# Patient Record
Sex: MALE | Race: WHITE | Employment: OTHER | URBAN - METROPOLITAN AREA
[De-identification: names, ages, dates, MRNs, and addresses within clinical notes are randomized per-mention and may not be internally consistent; named-entity substitution may affect disease eponyms.]

---

## 2017-12-05 ENCOUNTER — OFFICE VISIT (OUTPATIENT)
Dept: FAMILY MEDICINE CLINIC | Age: 57
End: 2017-12-05

## 2017-12-05 VITALS
SYSTOLIC BLOOD PRESSURE: 131 MMHG | HEIGHT: 69 IN | RESPIRATION RATE: 16 BRPM | TEMPERATURE: 97.8 F | OXYGEN SATURATION: 98 % | HEART RATE: 76 BPM | DIASTOLIC BLOOD PRESSURE: 81 MMHG | BODY MASS INDEX: 33.62 KG/M2 | WEIGHT: 227 LBS

## 2017-12-05 DIAGNOSIS — E55.9 VITAMIN D DEFICIENCY: ICD-10-CM

## 2017-12-05 DIAGNOSIS — F41.9 ANXIETY: ICD-10-CM

## 2017-12-05 DIAGNOSIS — Z12.5 PROSTATE CANCER SCREENING: ICD-10-CM

## 2017-12-05 DIAGNOSIS — E78.00 PURE HYPERCHOLESTEROLEMIA: ICD-10-CM

## 2017-12-05 DIAGNOSIS — I25.10 CORONARY ARTERY DISEASE INVOLVING NATIVE HEART WITHOUT ANGINA PECTORIS, UNSPECIFIED VESSEL OR LESION TYPE: ICD-10-CM

## 2017-12-05 DIAGNOSIS — H53.8 BLURRED VISION: ICD-10-CM

## 2017-12-05 DIAGNOSIS — H61.21 IMPACTED CERUMEN OF RIGHT EAR: ICD-10-CM

## 2017-12-05 DIAGNOSIS — I10 ESSENTIAL HYPERTENSION: ICD-10-CM

## 2017-12-05 DIAGNOSIS — Z23 NEED FOR SHINGLES VACCINE: ICD-10-CM

## 2017-12-05 DIAGNOSIS — R53.83 FATIGUE, UNSPECIFIED TYPE: Primary | ICD-10-CM

## 2017-12-05 LAB — HBA1C MFR BLD HPLC: 5.5 %

## 2017-12-05 RX ORDER — ESCITALOPRAM OXALATE 10 MG/1
TABLET ORAL
Qty: 90 TAB | Refills: 3 | Status: SHIPPED | OUTPATIENT
Start: 2017-12-05 | End: 2018-04-03 | Stop reason: SDUPTHER

## 2017-12-05 NOTE — PROGRESS NOTES
Noted fatigue past 2 mos. Sleeps O.K. Business slow. Working on diet and exercise. Weights an hour every other day. Has to take nap midday. Wakes up and still tired. Notes blurred vision at times. Eye redness in AM.  Rec try OTC lubricant. Advised to get wife to check for snoring and apnea. Visit Vitals    /81 (BP 1 Location: Right arm, BP Patient Position: Sitting)    Pulse 76    Temp 97.8 °F (36.6 °C) (Oral)    Resp 16    Ht 5' 8.5\" (1.74 m)    Wt 227 lb (103 kg)    SpO2 98%    BMI 34.01 kg/m2       Patient alert and cooperative. Left canal, TM clear. Right canal occluded with dark cerumen. Assessment:  1. Right cerumen impaction. 2. Fatigue. 3. Blurred vision. 4. Chronic anxiety, on chronic meds. 5. Vitamin D deficiency. 6. History of CAD. 7. Hypercholesterol. 8. HTN. Plan:  1. Check annual labs per orders. 2. Schedule afternoon physical at his convenience. 3. May need sleep evaluation to rule out apnea. 4. Praised for weight loss. 5. Follow otherwise here prn. Printed script for Shingrix vaccine.

## 2017-12-05 NOTE — PROGRESS NOTES
Chief Complaint   Patient presents with   Mendoza Ao in Ear     Last couple of months all the time.  Fatigue   Blurred vision at times and it is worse. Has been seen by the eye doctor. 1. Have you been to the ER, urgent care clinic since your last visit? Hospitalized since your last visit? No    2. Have you seen or consulted any other health care providers outside of the [address] [place] [address] since your last visit? Include any pap smears or colon screening. No   Saw Dr. Tom Valdes and in 6 weeks will do recheck. Had EKG done at that office. Advance Care Planning information reviewed and given to the patient at a previous visit. I have reviewed Health Maintenance with the patient and updated.

## 2017-12-05 NOTE — LETTER
12/7/2017 11:54 AM 
 
Mr. Jhon Rankin Dear Jhon Karri: 
 
Please find your most recent results below. Resulted Orders AMB POC HEMOGLOBIN A1C Result Value Ref Range Hemoglobin A1c (POC) 5.5 % VITAMIN D, 25 HYDROXY Result Value Ref Range VITAMIN D, 25-HYDROXY 20.3 (L) 30.0 - 100.0 ng/mL Comment:  
   Vitamin D deficiency has been defined by the [address] [place] [address] practice guideline as a 
level of serum 25-OH vitamin D less than 20 ng/mL (1,2). The Endocrine Society went on to further define vitamin D 
insufficiency as a level between 21 and 29 ng/mL (2). 1. IOM ([place] of Medicine). 2010. Dietary reference 
   intakes for calcium and D. [facility]: The 
   [place]. 2. Fausto MF, Abi WHITNEY, Uziel GARCIAS, et al. 
   Evaluation, treatment, and prevention of vitamin D 
   deficiency: an Endocrine Society clinical practice 
   guideline. JCEM. 2011 Jul; 96(7):1911-30. Narrative Performed at:  [address]  [id number] : Enedina Hernandez MD, Phone:  [phone] CBC WITH AUTOMATED DIFF Result Value Ref Range WBC 5.9 3.4 - 10.8 x10E3/uL  
 RBC 5.06 4.14 - 5.80 x10E6/uL HGB 14.5 13.0 - 17.7 g/dL Comment: **Please note reference interval change** HCT 45.0 37.5 - 51.0 % MCV 89 79 - 97 fL  
 MCH 28.7 26.6 - 33.0 pg  
 MCHC 32.2 31.5 - 35.7 g/dL  
 RDW 14.1 12.3 - 15.4 % PLATELET 669 102 - 695 x10E3/uL NEUTROPHILS 60 Not Estab. % Lymphocytes 27 Not Estab. % MONOCYTES 8 Not Estab. % EOSINOPHILS 3 Not Estab. % BASOPHILS 1 Not Estab. %  
 ABS. NEUTROPHILS 3.6 1.4 - 7.0 x10E3/uL Abs Lymphocytes 1.6 0.7 - 3.1 x10E3/uL  
 ABS. MONOCYTES 0.5 0.1 - 0.9 x10E3/uL  
 ABS. EOSINOPHILS 0.2 0.0 - 0.4 x10E3/uL  
 ABS. BASOPHILS 0.0 0.0 - 0.2 x10E3/uL IMMATURE GRANULOCYTES 1 Not Estab. %  
 ABS. IMM. GRANS. 0.1 0.0 - 0.1 x10E3/uL Narrative Performed at:  [address]  [id number] : Terry Ramirez MD, Phone:  [phone] URINALYSIS W/ RFLX MICROSCOPIC Result Value Ref Range Specific Gravity 1.028 1.005 - 1.030  
 pH (UA) 6.5 5.0 - 7.5 Color Yellow Yellow Appearance Clear Clear Leukocyte Esterase Negative Negative Protein Negative Negative/Trace Glucose Negative Negative Ketone Negative Negative Blood Negative Negative Bilirubin Negative Negative Urobilinogen 0.2 0.2 - 1.0 mg/dL Nitrites Negative Negative Microscopic Examination Comment Comment:  
   Microscopic not indicated and not performed. Narrative Performed at:  [address]  [id number] : Terry Ramirez MD, Phone:  [phone] TSH 3RD GENERATION Result Value Ref Range TSH 1.770 0.450 - 4.500 uIU/mL Narrative Performed at:  [address]  [id number] : Terry Ramirez MD, Phone:  [phone] PSA, DIAGNOSTIC (PROSTATE SPECIFIC AG) Result Value Ref Range Prostate Specific Ag 0.7 0.0 - 4.0 ng/mL Comment:  
   Roche ECLIA methodology. According to the American Urological Association, Serum PSA should 
decrease and remain at undetectable levels after radical 
prostatectomy. The AUA defines biochemical recurrence as an initial 
PSA value 0.2 ng/mL or greater followed by a subsequent confirmatory PSA value 0.2 ng/mL or greater. Values obtained with different assay methods or kits cannot be used 
interchangeably. Results cannot be interpreted as absolute evidence 
of the presence or absence of malignant disease. Narrative Performed at:  [address]  [id number] : Sindi Sprague MD, Phone:  [phone] METABOLIC PANEL, COMPREHENSIVE Result Value Ref Range Glucose 116 (H) 65 - 99 mg/dL BUN 11 6 - 24 mg/dL Creatinine 0.69 (L) 0.76 - 1.27 mg/dL GFR est non- >59 mL/min/1.73 GFR est  >59 mL/min/1.73  
 BUN/Creatinine ratio 16 9 - 20 Sodium 144 134 - 144 mmol/L Potassium 4.4 3.5 - 5.2 mmol/L Chloride 104 96 - 106 mmol/L  
 CO2 25 18 - 29 mmol/L Calcium 9.2 8.7 - 10.2 mg/dL Protein, total 6.6 6.0 - 8.5 g/dL Albumin 4.1 3.5 - 5.5 g/dL GLOBULIN, TOTAL 2.5 1.5 - 4.5 g/dL A-G Ratio 1.6 1.2 - 2.2 Bilirubin, total 0.3 0.0 - 1.2 mg/dL Alk. phosphatase 77 39 - 117 IU/L  
 AST (SGOT) 16 0 - 40 IU/L  
 ALT (SGPT) 21 0 - 44 IU/L Narrative Performed at:  [address]  [id number] : Sindi Sprague MD, Phone:  [phone] LIPID PANEL Result Value Ref Range Cholesterol, total 216 (H) 100 - 199 mg/dL Triglyceride 269 (H) 0 - 149 mg/dL HDL Cholesterol 34 (L) >39 mg/dL VLDL, calculated 54 (H) 5 - 40 mg/dL LDL, calculated 128 (H) 0 - 99 mg/dL Narrative Performed at:  [address]  [id number] : Sindi Sprague MD, Phone:  [phone] CVD REPORT Result Value Ref Range INTERPRETATION Note Comment:  
   Supplemental report is available. Narrative Performed at:  [address] [place] [address]  [id number] : uGnnar Cruz PhD, Phone:  [phone] Low Vit. DLaurina Pleasure Over The Counter supplement 5070-3308 units daily.  Lipids improved.  Add Krill oil for increased  TriGlycerides/low HDL(good Cholesterol).  Rest all O.K.  
   
 
 
 
Please call me if you have any questions: [phone] Sincerely, Nina Freeman MD

## 2017-12-05 NOTE — MR AVS SNAPSHOT
Visit Information Date & Time Provider Department Dept. Phone Encounter #  
 12/5/2017  9:40 AM Zen Benites MD [facility] [phone] [id number] Follow-up Instructions Return in about 3 months (around 3/5/2018) for [facility]. Upcoming Health Maintenance Date Due DTaP/Tdap/Td series (3 - Td) 5/24/2025 COLONOSCOPY 1/10/2027 Allergies as of 12/5/2017  Review Complete On: 12/5/2017 By: Zen Benites MD  
 No Known Allergies Current Immunizations  Reviewed on 12/5/2017 Name Date H1N1 FLU VACCINE 1/21/2010 Influenza Vaccine 10/3/2017, 10/1/2016, 11/5/2015, 10/4/2013 Influenza Vaccine (Quad) PF 10/21/2014 Influenza Vaccine Split 10/31/2012, 1/6/2012, 9/21/2010 Pneumococcal Conjugate (PCV-13) 10/21/2014 Pneumococcal Polysaccharide (PPSV-23) 12/1/2016 TDAP Vaccine 5/29/2008 Tdap 5/24/2015  6:34 PM  
  
 Reviewed by Zuleyma Bianchi RN on 12/5/2017 at 10:04 AM  
You Were Diagnosed With   
  
 Codes Comments Fatigue, unspecified type    -  Primary ICD-10-CM: R53.83 ICD-9-CM: 780.79 Anxiety     ICD-10-CM: F41.9 ICD-9-CM: 300.00 Blurred vision     ICD-10-CM: H53.8 ICD-9-CM: 368.8 Impacted cerumen of right ear     ICD-10-CM: H61.21 ICD-9-CM: 380.4 Vitamin D deficiency     ICD-10-CM: E55.9 ICD-9-CM: 268.9 Coronary artery disease involving native heart without angina pectoris, unspecified vessel or lesion type     ICD-10-CM: I25.10 ICD-9-CM: 414.01   
 Pure hypercholesterolemia     ICD-10-CM: E78.00 ICD-9-CM: 272.0 Essential hypertension     ICD-10-CM: I10 
ICD-9-CM: 401.9 Prostate cancer screening     ICD-10-CM: Z12.5 ICD-9-CM: V76.44 Vitals BP Pulse Temp Resp Height(growth percentile) Weight(growth percentile) 131/81 (BP 1 Location: Right arm, BP Patient Position: Sitting) 76 97.8 °F (36.6 °C) (Oral) 16 5' 8.5\" (1.74 m) 227 lb (103 kg) SpO2 BMI Smoking Status 98% 34.01 kg/m2 Current Every Day Smoker Vitals History BMI and BSA Data Body Mass Index Body Surface Area 34.01 kg/m 2 2.23 m 2 Preferred Pharmacy Pharmacy Name Phone [facility] [phone] Your Updated Medication List  
  
   
This list is accurate as of: 12/5/17 10:41 AM.  Always use your most recent med list.  
  
  
  
  
 aspirin delayed-release 81 mg tablet Take 81 mg by mouth daily. atorvastatin 40 mg tablet Commonly known as:  LIPITOR  
TAKE ONE TABLET BY MOUTH ONE TIME DAILY  
  
 escitalopram oxalate 10 mg tablet Commonly known as:  Celesta Murders TAKE 1 TABLET DAILY FOR    MOOD  
  
 LASIX 20 mg tablet Generic drug:  furosemide Take  by mouth daily. metoprolol tartrate 50 mg tablet Commonly known as:  LOPRESSOR Take 1 Tab by mouth daily. Prescriptions Sent to Pharmacy Refills  
 escitalopram oxalate (LEXAPRO) 10 mg tablet 3 Sig: TAKE 1 TABLET DAILY FOR    MOOD Class: Normal  
 Pharmacy: [facility] [address] Ph #: [phone] We Performed the Following AMB POC HEMOGLOBIN A1C [34092 CPT(R)] CBC WITH AUTOMATED DIFF [95730 CPT(R)] LIPID PANEL [73486 CPT(R)] METABOLIC PANEL, COMPREHENSIVE [93275 CPT(R)] PSA, DIAGNOSTIC (PROSTATE SPECIFIC AG) [id number] CPT(R)] REMOVAL IMPACTED CERUMEN IRRIGATION/LVG UNILAT [id number] CPT(R)] TSH 3RD GENERATION [00445 CPT(R)] URINALYSIS W/ RFLX MICROSCOPIC [61973 CPT(R)] VITAMIN D, 25 HYDROXY [id number] CPT(R)] Follow-up Instructions Return in about 3 months (around 3/5/2018) for [facility]. Introducing [address]! Aldo Guillory introduces [employer] patient portal. Now you can access parts of your medical record, email your doctor's office, and request medication refills online. 1. In your internet browser, go to [email or web address] 2. Click on the First Time User? Click Here link in the Sign In box. You will see the New Member Sign Up page. 3. Enter your [employer] Access Code exactly as it appears below. You will not need to use this code after youve completed the sign-up process. If you do not sign up before the expiration date, you must request a new code. · [employer] Access Code: [email or web address] Expires: 3/5/2018 10:34 AM 
 
4. Enter the last four digits of your Social Security Number (xxxx) and Date of Birth (mm/dd/yyyy) as indicated and click Submit. You will be taken to the next sign-up page. 5. Create a [employer] ID. This will be your [employer] login ID and cannot be changed, so think of one that is secure and easy to remember. 6. Create a [employer] password. You can change your password at any time. 7. Enter your Password Reset Question and Answer. This can be used at a later time if you forget your password. 8. Enter your e-mail address. You will receive e-mail notification when new information is available in [address] 9. Click Sign Up. You can now view and download portions of your medical record. 10. Click the Download Summary menu link to download a portable copy of your medical information. If you have questions, please visit the Frequently Asked Questions section of the [employer] website. Remember, [employer] is NOT to be used for urgent needs. For medical emergencies, dial 911. Now available from your iPhone and Android! Please provide this summary of care documentation to your next provider. Your primary care clinician is listed as [id number] If you have any questions after today's visit, please call [phone].

## 2017-12-06 LAB
25(OH)D3+25(OH)D2 SERPL-MCNC: 20.3 NG/ML (ref 30–100)
ALBUMIN SERPL-MCNC: 4.1 G/DL (ref 3.5–5.5)
ALBUMIN/GLOB SERPL: 1.6 {RATIO} (ref 1.2–2.2)
ALP SERPL-CCNC: 77 IU/L (ref 39–117)
ALT SERPL-CCNC: 21 IU/L (ref 0–44)
APPEARANCE UR: CLEAR
AST SERPL-CCNC: 16 IU/L (ref 0–40)
BASOPHILS # BLD AUTO: 0 X10E3/UL (ref 0–0.2)
BASOPHILS NFR BLD AUTO: 1 %
BILIRUB SERPL-MCNC: 0.3 MG/DL (ref 0–1.2)
BILIRUB UR QL STRIP: NEGATIVE
BUN SERPL-MCNC: 11 MG/DL (ref 6–24)
BUN/CREAT SERPL: 16 (ref 9–20)
CALCIUM SERPL-MCNC: 9.2 MG/DL (ref 8.7–10.2)
CHLORIDE SERPL-SCNC: 104 MMOL/L (ref 96–106)
CHOLEST SERPL-MCNC: 216 MG/DL (ref 100–199)
CO2 SERPL-SCNC: 25 MMOL/L (ref 18–29)
COLOR UR: YELLOW
CREAT SERPL-MCNC: 0.69 MG/DL (ref 0.76–1.27)
EOSINOPHIL # BLD AUTO: 0.2 X10E3/UL (ref 0–0.4)
EOSINOPHIL NFR BLD AUTO: 3 %
ERYTHROCYTE [DISTWIDTH] IN BLOOD BY AUTOMATED COUNT: 14.1 % (ref 12.3–15.4)
GFR SERPLBLD CREATININE-BSD FMLA CKD-EPI: 105 ML/MIN/1.73
GFR SERPLBLD CREATININE-BSD FMLA CKD-EPI: 122 ML/MIN/1.73
GLOBULIN SER CALC-MCNC: 2.5 G/DL (ref 1.5–4.5)
GLUCOSE SERPL-MCNC: 116 MG/DL (ref 65–99)
GLUCOSE UR QL: NEGATIVE
HCT VFR BLD AUTO: 45 % (ref 37.5–51)
HDLC SERPL-MCNC: 34 MG/DL
HGB BLD-MCNC: 14.5 G/DL (ref 13–17.7)
HGB UR QL STRIP: NEGATIVE
IMM GRANULOCYTES # BLD: 0.1 X10E3/UL (ref 0–0.1)
IMM GRANULOCYTES NFR BLD: 1 %
INTERPRETATION, 910389: NORMAL
KETONES UR QL STRIP: NEGATIVE
LDLC SERPL CALC-MCNC: 128 MG/DL (ref 0–99)
LEUKOCYTE ESTERASE UR QL STRIP: NEGATIVE
LYMPHOCYTES # BLD AUTO: 1.6 X10E3/UL (ref 0.7–3.1)
LYMPHOCYTES NFR BLD AUTO: 27 %
MCH RBC QN AUTO: 28.7 PG (ref 26.6–33)
MCHC RBC AUTO-ENTMCNC: 32.2 G/DL (ref 31.5–35.7)
MCV RBC AUTO: 89 FL (ref 79–97)
MICRO URNS: NORMAL
MONOCYTES # BLD AUTO: 0.5 X10E3/UL (ref 0.1–0.9)
MONOCYTES NFR BLD AUTO: 8 %
NEUTROPHILS # BLD AUTO: 3.6 X10E3/UL (ref 1.4–7)
NEUTROPHILS NFR BLD AUTO: 60 %
NITRITE UR QL STRIP: NEGATIVE
PH UR STRIP: 6.5 [PH] (ref 5–7.5)
PLATELET # BLD AUTO: 219 X10E3/UL (ref 150–379)
POTASSIUM SERPL-SCNC: 4.4 MMOL/L (ref 3.5–5.2)
PROT SERPL-MCNC: 6.6 G/DL (ref 6–8.5)
PROT UR QL STRIP: NEGATIVE
PSA SERPL-MCNC: 0.7 NG/ML (ref 0–4)
RBC # BLD AUTO: 5.06 X10E6/UL (ref 4.14–5.8)
SODIUM SERPL-SCNC: 144 MMOL/L (ref 134–144)
SP GR UR: 1.03 (ref 1–1.03)
TRIGL SERPL-MCNC: 269 MG/DL (ref 0–149)
TSH SERPL DL<=0.005 MIU/L-ACNC: 1.77 UIU/ML (ref 0.45–4.5)
UROBILINOGEN UR STRIP-MCNC: 0.2 MG/DL (ref 0.2–1)
VLDLC SERPL CALC-MCNC: 54 MG/DL (ref 5–40)
WBC # BLD AUTO: 5.9 X10E3/UL (ref 3.4–10.8)

## 2017-12-06 NOTE — PROGRESS NOTES
Low Vit. D. Take OTC supp 3111-5136 units daily. Lipids improved. Add Krill oil for inc TG/low HDL.   Rest all O.K.

## 2018-04-03 DIAGNOSIS — F41.9 ANXIETY: ICD-10-CM

## 2018-04-04 RX ORDER — ESCITALOPRAM OXALATE 10 MG/1
TABLET ORAL
Qty: 90 TAB | Refills: 3 | Status: SHIPPED | OUTPATIENT
Start: 2018-04-04 | End: 2018-07-03

## 2018-04-04 NOTE — TELEPHONE ENCOUNTER
PCP: Aric Méndez MD    Last appt: 12/5/2017  No future appointments.     Requested Prescriptions     Pending Prescriptions Disp Refills    escitalopram oxalate (LEXAPRO) 10 mg tablet 90 Tab 3     Sig: TAKE 1 TABLET DAILY FOR    MOOD     Lab Results   Component Value Date/Time    Sodium 144 12/05/2017 11:27 AM    Potassium 4.4 12/05/2017 11:27 AM    Chloride 104 12/05/2017 11:27 AM    CO2 25 12/05/2017 11:27 AM    Anion gap 12 10/20/2010 03:15 PM    Glucose 116 (H) 12/05/2017 11:27 AM    BUN 11 12/05/2017 11:27 AM    Creatinine 0.69 (L) 12/05/2017 11:27 AM    BUN/Creatinine ratio 16 12/05/2017 11:27 AM    GFR est  12/05/2017 11:27 AM    GFR est non- 12/05/2017 11:27 AM    Calcium 9.2 12/05/2017 11:27 AM     Lab Results   Component Value Date/Time    Hemoglobin A1c 6.0 (H) 05/07/2013 10:46 AM    Hemoglobin A1c (POC) 5.5 12/05/2017 10:22 AM     Lab Results   Component Value Date/Time    Cholesterol, total 216 (H) 12/05/2017 11:27 AM    HDL Cholesterol 34 (L) 12/05/2017 11:27 AM    LDL, calculated 128 (H) 12/05/2017 11:27 AM    VLDL, calculated 54 (H) 12/05/2017 11:27 AM    Triglyceride 269 (H) 12/05/2017 11:27 AM    CHOL/HDL Ratio 4.9 04/26/2010 10:29 AM     Lab Results   Component Value Date/Time    TSH 1.770 12/05/2017 11:27 AM

## 2018-04-24 ENCOUNTER — TELEPHONE (OUTPATIENT)
Dept: FAMILY MEDICINE CLINIC | Age: 58
End: 2018-04-24

## 2018-04-24 NOTE — TELEPHONE ENCOUNTER
Patient did not get his my chart message. Message left on his voice mail that his medication had been refilled.

## 2018-05-22 ENCOUNTER — OFFICE VISIT (OUTPATIENT)
Dept: FAMILY MEDICINE CLINIC | Age: 58
End: 2018-05-22

## 2018-05-22 VITALS
BODY MASS INDEX: 32.51 KG/M2 | OXYGEN SATURATION: 99 % | RESPIRATION RATE: 18 BRPM | WEIGHT: 219.5 LBS | TEMPERATURE: 97.2 F | SYSTOLIC BLOOD PRESSURE: 152 MMHG | HEART RATE: 72 BPM | HEIGHT: 69 IN | DIASTOLIC BLOOD PRESSURE: 90 MMHG

## 2018-05-22 DIAGNOSIS — R53.83 FATIGUE, UNSPECIFIED TYPE: ICD-10-CM

## 2018-05-22 DIAGNOSIS — H53.8 BLURRY VISION: Primary | ICD-10-CM

## 2018-05-22 DIAGNOSIS — R07.89 RIGHT-SIDED CHEST WALL PAIN: ICD-10-CM

## 2018-05-22 DIAGNOSIS — R07.9 CHEST PAIN, UNSPECIFIED TYPE: ICD-10-CM

## 2018-05-22 DIAGNOSIS — R20.2 PARESTHESIA: ICD-10-CM

## 2018-05-22 DIAGNOSIS — M79.645 PAIN OF FINGER OF LEFT HAND: ICD-10-CM

## 2018-05-22 NOTE — PATIENT INSTRUCTIONS
Stanley Espinoza TODAY, please go to:   CHECK OUT - If you received a referral, Show the     Please schedule the following appointments at [address] [place] [address]:  · Lab and imaging follow up with Dr. Jay Jay Khanna in 3 weeks. · Lab visit, NOT fasting, before our next visit. · Disability eval with Dr. Antonette Garcia in July 2018        Today's Plan:  Have Xrays and ultrasound  Have blood work  Have ECHO    _____________________   Please sign up for [employer] to receive your results electronically.

## 2018-05-22 NOTE — PROGRESS NOTES
[address] [place] [address] Visit   05/22/2018  Patient ID: Debbie Vance is a 62 y.o. male. Assessment/Plan:    Diagnoses and all orders for this visit:    1. Blurry vision  -     REFERRAL TO OPHTHALMOLOGY    2. Fatigue, unspecified type  -     VITAMIN D, 25 HYDROXY  -     VITAMIN B12 & FOLATE  -     2D ECHO COMPLETE ADULT (TTE) W OR WO CONTR; Future    3. Right-sided chest wall pain  -     XR CHEST PA LAT; Future  -     US RETROPERITONEUM COMP; Future    4. Pain of finger of left hand  -     XR 3RD FINGER RT MIN 2 V; Future    5. Chest pain, unspecified type    6. Paresthesia      Follow up labs and imaging on follow up visit  For finger, I suspect he has traumatized a superficial nerve when he has hit it before. eval with XR for e/o arthritis. Chest pain likely muscular, very limited, UTD with cardiology f/u  Chest wall/flank/back pain may be musculoskeletal. CXR and abdominal imaging. Update labs to eval fatigue. See ophtho again for second opinion. eval for HF given h/o MI and worsening fatigue    See PCP to discuss disability when he returns. See patient instructions for more. Counselled pt on Patient health concerns and plans. Patient was offered a choice/choices in the treatment plan today. Reviewed return precautions as appropriate. Patient expresses understanding of the plan and agrees with recommendations. Patient Instructions   . TODAY, please go to:   CHECK OUT - If you received a referral, Show the     Please schedule the following appointments at [address] [place] [address]:  · Lab and imaging follow up with Dr. Samantha Combs in 3 weeks. · Lab visit, NOT fasting, before our next visit. · Disability eval with Dr. Darnell Eduardo in July 2018        Today's Plan:  Have Xrays and ultrasound  Have blood work  Have ECHO    _____________________   Please sign up for [employer] to receive your results electronically.         Subjective:   HPI:  Debbie Vance is a 62 y.o. male being seen for:   Chief Complaint   Patient presents with    Side Pain     Right side for the past month and it comes and goes. Worse when laying down. Also has right hand pain at times.  Blurred Vision     Has been to the Eye doctor and got glasses but has blurred vision.  Fatigue     started 10 years ago. Fatigue  · After AAA repair. Then was told he had a silent heart attack, had a stent placed. Since then feels tired. Needs daily nap. No swelling in legs. Sometimes SOB. · Has lost weight on purpose. Blurry vision  ·  eyes red in the AM. Using visine for years. Worse now. Tried allergy eye drops in the past.   · Has gone to eye doctor. Tries glasses, gets vertigo. Side pain  ·  right side pain, when in recliner on in bed at night. Shooting pain. · Intermittent  · Sleeps on that side. Improves with position change. · X 2 months   · No chronic cough or hemoptysis. Pain in hand,  ·  right middle finger, X1 year. Has been getting burning in right calf X 2 weeks     Screening and Prevention Due:  There are no preventive care reminders to display for this patient. Review of Systems   Intermittent chest pain. With activity, improves with rest. Heart burn. Saw NP yesterday and was okay. Once a week. Lasts for seconds. Otherwise as noted in HPI    Social History     Social History Narrative     History   Smoking Status    Current Every Day Smoker    Packs/day: 0.25    Types: Cigarettes   Smokeless Tobacco    Never Used     ?   Objective:     Visit Vitals    /90 (BP 1 Location: Left arm, BP Patient Position: Sitting)    Pulse 72    Temp 97.2 °F (36.2 °C) (Oral)    Resp 18    Ht 5' 8.5\" (1.74 m)    Wt 219 lb 8 oz (99.6 kg)    SpO2 99%    BMI 32.89 kg/m2       BP Readings from Last 3 Encounters:   05/22/18 152/90   12/05/17 131/81   12/27/16 152/84       Wt Readings from Last 3 Encounters:   05/22/18 219 lb 8 oz (99.6 kg)   12/05/17 227 lb (103 kg)   12/27/16 266 lb 11.2 oz (121 kg) Physical Exam   Constitutional: He appears well-developed and well-nourished. No distress. Cardiovascular: Normal rate, regular rhythm and normal heart sounds. Exam reveals no gallop and no friction rub. No murmur heard. Pulmonary/Chest: Effort normal. No accessory muscle usage. No respiratory distress. He has no decreased breath sounds. He has no wheezes. He has no rhonchi. He has no rales. Nl inspection of right chest wall, back, and flank  No palpable deformity  ttp only in left flank   Musculoskeletal:   Nl sensation,  in right hand. Neg tinel's   Nl cap refill  No ttp at 3rd DIP or PIP   Neurological: He is alert. Psychiatric: He has a normal mood and affect. His behavior is normal.       No Known Allergies  Prior to Admission medications    Medication Sig Start Date End Date Taking? Authorizing Provider   escitalopram oxalate (LEXAPRO) 10 mg tablet TAKE 1 TABLET DAILY FOR    MOOD 4/4/18  Yes Alban Frost NP   atorvastatin (LIPITOR) 40 mg tablet TAKE ONE TABLET BY MOUTH ONE TIME DAILY  10/23/15  Yes Harley Goyal MD   metoprolol (LOPRESSOR) 50 mg tablet Take 1 Tab by mouth daily. 7/11/11  Yes Guerline Neves DO   furosemide (LASIX) 20 mg tablet Take  by mouth daily. Yes Historical Provider   aspirin delayed-release 81 mg tablet Take 81 mg by mouth daily.    Yes Historical Provider     Patient Active Problem List   Diagnosis Code    Pure hypercholesterolemia E78.00    CAD (coronary artery disease) I25.10    Essential hypertension I10    Anxiety F41.9    Cigarette smoker F17.210    Vitamin D deficiency E55.9    Non compliance with medical treatment Z91.19    Colon cancer screening Z12.11

## 2018-05-22 NOTE — MR AVS SNAPSHOT
[address] [facility] 
 
 
 [address] [facility] [address] 
[address] [facility] [address] 
[phone] Patient: Letty Haney MRN:  [id number] Visit Information Date & Time Provider Department Dept. Phone Encounter #  
 5/22/2018  5:00 PM [address] [facility] Nicolás Phoenix MD [facility] [phone] [id number] Upcoming Health Maintenance Date Due Influenza Age 5 to Adult 8/1/2018 DTaP/Tdap/Td series (3 - Td) 5/24/2025 COLONOSCOPY 1/10/2027 Allergies as of 5/22/2018  Review Complete On: 5/22/2018 By: Calderon Koehler RN No Known Allergies Current Immunizations  Reviewed on 12/5/2017 Name Date H1N1 FLU VACCINE 1/21/2010 Influenza Vaccine 10/3/2017, 10/1/2016, 11/5/2015, 10/4/2013 Influenza Vaccine (Quad) PF 10/21/2014 Influenza Vaccine Split 10/31/2012, 1/6/2012, 9/21/2010 Pneumococcal Conjugate (PCV-13) 10/21/2014 Pneumococcal Polysaccharide (PPSV-23) 12/1/2016 TDAP Vaccine 5/29/2008 Tdap 5/24/2015  6:34 PM  
  
 Not reviewed this visit You Were Diagnosed With   
  
 Codes Comments Blurry vision    -  Primary ICD-10-CM: H53.8 ICD-9-CM: 368.8 Fatigue, unspecified type     ICD-10-CM: R53.83 ICD-9-CM: 780.79 Right-sided chest wall pain     ICD-10-CM: R07.89 ICD-9-CM: 786.52 Pain of finger of left hand     ICD-10-CM: M79.645 ICD-9-CM: 729.5 Chest pain, unspecified type     ICD-10-CM: R07.9 ICD-9-CM: 786.50 Paresthesia     ICD-10-CM: R20.2 ICD-9-CM: 981. 0 Vitals BP Pulse Temp Resp Height(growth percentile) Weight(growth percentile) 152/90 (BP 1 Location: Left arm, BP Patient Position: Sitting) 72 97.2 °F (36.2 °C) (Oral) 18 5' 8.5\" (1.74 m) 219 lb 8 oz (99.6 kg) SpO2 BMI Smoking Status 99% 32.89 kg/m2 Current Every Day Smoker Vitals History BMI and BSA Data Body Mass Index Body Surface Area  
 32.89 kg/m 2 2.19 m 2 Preferred Pharmacy Pharmacy Name Phone [facility] [address] [place] [facility] Annia Noe [phone] Your Updated Medication List  
  
   
This list is accurate as of 5/22/18  6:46 PM.  Always use your most recent med list.  
  
  
  
  
 aspirin delayed-release 81 mg tablet Take 81 mg by mouth daily. atorvastatin 40 mg tablet Commonly known as:  LIPITOR  
TAKE ONE TABLET BY MOUTH ONE TIME DAILY  
  
 escitalopram oxalate 10 mg tablet Commonly known as:  Rubi Angst TAKE 1 TABLET DAILY FOR    MOOD  
  
 LASIX 20 mg tablet Generic drug:  furosemide Take  by mouth daily. metoprolol tartrate 50 mg tablet Commonly known as:  LOPRESSOR Take 1 Tab by mouth daily. We Performed the Following REFERRAL TO OPHTHALMOLOGY [REF57 Custom] Comments:  
 Please evaluate patient for blurry vision, red eyes , possible chronic dry eyes. VITAMIN B12 & FOLATE [69844 CPT(R)] VITAMIN D, 25 HYDROXY [id number] CPT(R)] To-Do List   
 05/22/2018 ECHO:  2D ECHO COMPLETE ADULT (TTE) W OR WO CONTR   
  
 05/22/2018 Imaging:  US RETROPERITONEUM COMP   
  
 05/22/2018 Imaging:  XR 3RD FINGER RT MIN 2 V   
  
 05/22/2018 Imaging:  XR CHEST PA LAT Referral Information Referral ID Referred By Referred To  
  
 [id number] Alanna Poole MD   
   [address] [facility] [address] [place] [address] Phone: [phone] Fax: [phone] Visits Status Start Date End Date 1 New Request 5/22/18 5/22/19 If your referral has a status of pending review or denied, additional information will be sent to support the outcome of this decision. Patient Instructions Annamary Jennifer TODAY, please go to: CHECK OUT - If you received a referral, Show the  Please schedule the following appointments at [facility] OUT: 
· Lab and imaging follow up with Dr. Adán Madrid in 3 weeks. · Lab visit, NOT fasting, before our next visit. · Disability eval with Dr. Garrett Lopez in July 2018 Today's Plan: 
Have Xrays and ultrasound Have blood work Have ECHO 
 
_____________________ Please sign up for Vibrow to receive your results electronically. Introducing \[facility]\"" & HEALTH SERVICES! Dear Joyce Denise: Thank you for requesting a [employer] account. Our records indicate that you already have an active [employer] account. You can access your account anytime at [email or web address] Did you know that you can access your hospital and ER discharge instructions at any time in [employer]? You can also review all of your test results from your hospital stay or ER visit. Additional Information If you have questions, please visit the Frequently Asked Questions section of the [employer] website at [email or web address] Remember, [employer] is NOT to be used for urgent needs. For medical emergencies, dial 911. Now available from your iPhone and Android! Please provide this summary of care documentation to your next provider. Your primary care clinician is listed as [id number] If you have any questions after today's visit, please call [phone].

## 2018-05-24 ENCOUNTER — HOSPITAL ENCOUNTER (OUTPATIENT)
Dept: GENERAL RADIOLOGY | Age: 58
Discharge: HOME | End: 2018-05-24
Payer: COMMERCIAL

## 2018-05-24 DIAGNOSIS — M79.645 PAIN OF FINGER OF LEFT HAND: ICD-10-CM

## 2018-05-24 DIAGNOSIS — R07.89 RIGHT-SIDED CHEST WALL PAIN: ICD-10-CM

## 2018-05-24 PROCEDURE — 71046 X-RAY EXAM CHEST 2 VIEWS: CPT

## 2018-05-24 PROCEDURE — 73140 X-RAY EXAM OF FINGER(S): CPT

## 2018-05-29 ENCOUNTER — TELEPHONE (OUTPATIENT)
Dept: FAMILY MEDICINE CLINIC | Age: 58
End: 2018-05-29

## 2018-05-29 ENCOUNTER — HOSPITAL ENCOUNTER (OUTPATIENT)
Dept: ULTRASOUND IMAGING | Age: 58
Discharge: HOME | End: 2018-05-29
Attending: FAMILY MEDICINE
Payer: COMMERCIAL

## 2018-05-29 DIAGNOSIS — R07.89 RIGHT-SIDED CHEST WALL PAIN: ICD-10-CM

## 2018-05-29 DIAGNOSIS — N28.89 RIGHT KIDNEY MASS: Primary | ICD-10-CM

## 2018-05-29 PROCEDURE — 76770 US EXAM ABDO BACK WALL COMP: CPT

## 2018-05-29 NOTE — PROGRESS NOTES
5/29/2018   3:02 PM [place]          [phone]- no answer. [phone] (home) - no answer.   3:06 PM home #- LVW asking for a call back

## 2018-05-31 ENCOUNTER — TELEPHONE (OUTPATIENT)
Dept: FAMILY MEDICINE CLINIC | Age: 58
End: 2018-05-31

## 2018-05-31 NOTE — TELEPHONE ENCOUNTER
----- Message from Meaghan Colon sent at 5/30/2018  4:08 PM EDT -----  Regarding: Dr. Nikolai Díaz Telephone  Patient returning a call regarding getting a test done.  Contact is [phone]

## 2018-06-07 ENCOUNTER — HOSPITAL ENCOUNTER (OUTPATIENT)
Dept: CT IMAGING | Age: 58
Discharge: HOME | End: 2018-06-07
Attending: FAMILY MEDICINE
Payer: COMMERCIAL

## 2018-06-07 DIAGNOSIS — N28.89 RIGHT KIDNEY MASS: ICD-10-CM

## 2018-06-07 LAB — CREAT BLD-MCNC: 0.8 MG/DL (ref 0.6–1.3)

## 2018-06-07 PROCEDURE — 74011636320 HC RX REV CODE- 636/320: Performed by: FAMILY MEDICINE

## 2018-06-07 PROCEDURE — 72193 CT PELVIS W/DYE: CPT

## 2018-06-07 PROCEDURE — 74011000255 HC RX REV CODE- 255: Performed by: FAMILY MEDICINE

## 2018-06-07 PROCEDURE — 82565 ASSAY OF CREATININE: CPT

## 2018-06-07 PROCEDURE — 74011250636 HC RX REV CODE- 250/636: Performed by: FAMILY MEDICINE

## 2018-06-07 RX ORDER — BARIUM SULFATE 20 MG/ML
900 SUSPENSION ORAL
Status: COMPLETED | OUTPATIENT
Start: 2018-06-07 | End: 2018-06-07

## 2018-06-07 RX ORDER — SODIUM CHLORIDE 0.9 % (FLUSH) 0.9 %
10 SYRINGE (ML) INJECTION
Status: COMPLETED | OUTPATIENT
Start: 2018-06-07 | End: 2018-06-07

## 2018-06-07 RX ORDER — SODIUM CHLORIDE 9 MG/ML
50 INJECTION, SOLUTION INTRAVENOUS
Status: COMPLETED | OUTPATIENT
Start: 2018-06-07 | End: 2018-06-07

## 2018-06-07 RX ADMIN — BARIUM SULFATE 900 ML: 21 SUSPENSION ORAL at 12:00

## 2018-06-07 RX ADMIN — IOPAMIDOL 100 ML: 755 INJECTION, SOLUTION INTRAVENOUS at 12:00

## 2018-06-07 RX ADMIN — Medication 10 ML: at 12:00

## 2018-06-07 RX ADMIN — SODIUM CHLORIDE 50 ML/HR: 900 INJECTION, SOLUTION INTRAVENOUS at 12:00

## 2018-06-08 ENCOUNTER — TELEPHONE (OUTPATIENT)
Dept: FAMILY MEDICINE CLINIC | Age: 58
End: 2018-06-08

## 2018-06-08 DIAGNOSIS — C64.9 PAPILLARY RENAL CELL CARCINOMA (HCC): Primary | ICD-10-CM

## 2018-06-08 NOTE — PROGRESS NOTES
6/8/2018 around 9:49 AM patient called back. Spoke with patient. Shared results, imaging c/w kidney cancer. No evidence of mets on image. Recommend f/u with oncology. He prefers [facility] as this is close to his home. Will refer him to Dr. Kasia Hogan. Nurse to schedule and notify him of appointment. May call and LVM if he is not available. Best number is     _____________________   Please call [facility] today, see oncology referral. Schedule patient ASAP, new kidney cancer diagnosis. Please call patient back with appointment info.

## 2018-06-08 NOTE — TELEPHONE ENCOUNTER
Writer called [facility] to schedule urgent appointment referral with Dr. Antonia Pulliam due to new dx of kidney ca. Pt is aware. Dr. Lakeshia Davila d/w pt via telephone call after receiving results. Writer spoke with scheduling office at [address] [place] [address]. Per PSR, they cannot schedule appointments until they receive all information needed from office and staff for pt. Writer verbalized understanding. PSR gave writer fax number needed to fax information over it. Writer printed off pt demographic sheet, lov notes with Dr. Lakeshia Davila, [address] [place] [address] results, and most recent labs. Writer faxed all information requested to fax number given by PSR. Writer made Dr. Lakeshia Davila aware of [facility]'s protocols for scheduling pts. Writer called pt and left VM as requested per note Dr. Lakeshia Davila made in chart for pt letting him know that [facility] will be calling him soon to make an appointment with Dr. Antonia Pulliam.

## 2018-06-08 NOTE — PROGRESS NOTES
6/8/2018 9:30 AM  [phone] (home) - LVM to call office    Telephone Information:  [employer]          [phone] 9:32 AM - - LVM to call office

## 2018-06-11 PROBLEM — H02.401 ACQUIRED PTOSIS OF RIGHT EYELID: Status: ACTIVE | Noted: 2018-06-11

## 2018-06-11 PROBLEM — H01.006 BLEPHARITIS OF EYELID OF LEFT EYE: Status: ACTIVE | Noted: 2018-06-11

## 2018-06-11 PROBLEM — H01.003 BLEPHARITIS OF EYELID OF RIGHT EYE: Status: ACTIVE | Noted: 2018-06-11

## 2018-06-11 PROBLEM — H25.13 CATARACT, NUCLEAR SCLEROTIC, BOTH EYES: Status: ACTIVE | Noted: 2018-06-11

## 2018-06-13 ENCOUNTER — HOSPITAL ENCOUNTER (OUTPATIENT)
Dept: NON INVASIVE DIAGNOSTICS | Age: 58
Discharge: HOME | End: 2018-06-13
Attending: FAMILY MEDICINE
Payer: COMMERCIAL

## 2018-06-13 DIAGNOSIS — R53.83 FATIGUE, UNSPECIFIED TYPE: ICD-10-CM

## 2018-06-13 PROCEDURE — 93306 TTE W/DOPPLER COMPLETE: CPT

## 2018-06-19 ENCOUNTER — HOSPITAL ENCOUNTER (INPATIENT)
Age: 58
LOS: 2 days | Discharge: HOME | DRG: 247 | End: 2018-06-22
Attending: EMERGENCY MEDICINE | Admitting: HOSPITALIST
Payer: COMMERCIAL

## 2018-06-19 ENCOUNTER — APPOINTMENT (OUTPATIENT)
Dept: GENERAL RADIOLOGY | Age: 58
DRG: 247 | End: 2018-06-19
Attending: EMERGENCY MEDICINE
Payer: COMMERCIAL

## 2018-06-19 ENCOUNTER — TELEPHONE (OUTPATIENT)
Dept: FAMILY MEDICINE CLINIC | Age: 58
End: 2018-06-19

## 2018-06-19 DIAGNOSIS — R07.9 ACUTE CHEST PAIN: ICD-10-CM

## 2018-06-19 DIAGNOSIS — C64.9 PAPILLARY RENAL CELL CARCINOMA (HCC): Primary | ICD-10-CM

## 2018-06-19 DIAGNOSIS — I21.4 NSTEMI (NON-ST ELEVATED MYOCARDIAL INFARCTION) (HCC): Primary | ICD-10-CM

## 2018-06-19 DIAGNOSIS — Z86.79 S/P AAA REPAIR: ICD-10-CM

## 2018-06-19 DIAGNOSIS — Z98.890 S/P AAA REPAIR: ICD-10-CM

## 2018-06-19 LAB
ALBUMIN SERPL-MCNC: 3.8 G/DL (ref 3.5–5)
ALBUMIN/GLOB SERPL: 1.1 {RATIO} (ref 1.1–2.2)
ALP SERPL-CCNC: 88 U/L (ref 45–117)
ALT SERPL-CCNC: 29 U/L (ref 12–78)
ANION GAP SERPL CALC-SCNC: 7 MMOL/L (ref 5–15)
APPEARANCE UR: CLEAR
AST SERPL-CCNC: 15 U/L (ref 15–37)
BACTERIA URNS QL MICRO: NEGATIVE /HPF
BASOPHILS # BLD: 0.1 K/UL (ref 0–0.1)
BASOPHILS NFR BLD: 1 % (ref 0–1)
BILIRUB SERPL-MCNC: 0.2 MG/DL (ref 0.2–1)
BILIRUB UR QL: NEGATIVE
BUN SERPL-MCNC: 14 MG/DL (ref 6–20)
BUN/CREAT SERPL: 14 (ref 12–20)
CALCIUM SERPL-MCNC: 9.3 MG/DL (ref 8.5–10.1)
CHLORIDE SERPL-SCNC: 109 MMOL/L (ref 97–108)
CK SERPL-CCNC: 118 U/L (ref 39–308)
CO2 SERPL-SCNC: 28 MMOL/L (ref 21–32)
COLOR UR: NORMAL
CREAT SERPL-MCNC: 1.02 MG/DL (ref 0.7–1.3)
DIFFERENTIAL METHOD BLD: ABNORMAL
EOSINOPHIL # BLD: 0.2 K/UL (ref 0–0.4)
EOSINOPHIL NFR BLD: 3 % (ref 0–7)
EPITH CASTS URNS QL MICRO: NORMAL /LPF
ERYTHROCYTE [DISTWIDTH] IN BLOOD BY AUTOMATED COUNT: 13.6 % (ref 11.5–14.5)
GLOBULIN SER CALC-MCNC: 3.6 G/DL (ref 2–4)
GLUCOSE SERPL-MCNC: 122 MG/DL (ref 65–100)
GLUCOSE UR STRIP.AUTO-MCNC: NEGATIVE MG/DL
HCT VFR BLD AUTO: 44.3 % (ref 36.6–50.3)
HGB BLD-MCNC: 14.4 G/DL (ref 12.1–17)
HGB UR QL STRIP: NEGATIVE
HYALINE CASTS URNS QL MICRO: NORMAL /LPF (ref 0–5)
IMM GRANULOCYTES # BLD: 0.1 K/UL (ref 0–0.04)
IMM GRANULOCYTES NFR BLD AUTO: 1 % (ref 0–0.5)
KETONES UR QL STRIP.AUTO: NEGATIVE MG/DL
LEUKOCYTE ESTERASE UR QL STRIP.AUTO: NEGATIVE
LIPASE SERPL-CCNC: 323 U/L (ref 73–393)
LYMPHOCYTES # BLD: 2.4 K/UL (ref 0.8–3.5)
LYMPHOCYTES NFR BLD: 28 % (ref 12–49)
MCH RBC QN AUTO: 29.2 PG (ref 26–34)
MCHC RBC AUTO-ENTMCNC: 32.5 G/DL (ref 30–36.5)
MCV RBC AUTO: 89.9 FL (ref 80–99)
MONOCYTES # BLD: 0.7 K/UL (ref 0–1)
MONOCYTES NFR BLD: 9 % (ref 5–13)
NEUTS SEG # BLD: 5.1 K/UL (ref 1.8–8)
NEUTS SEG NFR BLD: 59 % (ref 32–75)
NITRITE UR QL STRIP.AUTO: NEGATIVE
NRBC # BLD: 0 K/UL (ref 0–0.01)
NRBC BLD-RTO: 0 PER 100 WBC
PH UR STRIP: 6 [PH] (ref 5–8)
PLATELET # BLD AUTO: 215 K/UL (ref 150–400)
PMV BLD AUTO: 11.2 FL (ref 8.9–12.9)
POTASSIUM SERPL-SCNC: 3.9 MMOL/L (ref 3.5–5.1)
PROT SERPL-MCNC: 7.4 G/DL (ref 6.4–8.2)
PROT UR STRIP-MCNC: NEGATIVE MG/DL
RBC # BLD AUTO: 4.93 M/UL (ref 4.1–5.7)
RBC #/AREA URNS HPF: NORMAL /HPF (ref 0–5)
SODIUM SERPL-SCNC: 144 MMOL/L (ref 136–145)
SP GR UR REFRACTOMETRY: 1.02 (ref 1–1.03)
TROPONIN I SERPL-MCNC: <0.05 NG/ML
UA: UC IF INDICATED,UAUC: NORMAL
UROBILINOGEN UR QL STRIP.AUTO: 1 EU/DL (ref 0.2–1)
WBC # BLD AUTO: 8.6 K/UL (ref 4.1–11.1)
WBC URNS QL MICRO: NORMAL /HPF (ref 0–4)

## 2018-06-19 PROCEDURE — 99285 EMERGENCY DEPT VISIT HI MDM: CPT

## 2018-06-19 PROCEDURE — 80053 COMPREHEN METABOLIC PANEL: CPT | Performed by: EMERGENCY MEDICINE

## 2018-06-19 PROCEDURE — 82550 ASSAY OF CK (CPK): CPT | Performed by: EMERGENCY MEDICINE

## 2018-06-19 PROCEDURE — 84484 ASSAY OF TROPONIN QUANT: CPT | Performed by: EMERGENCY MEDICINE

## 2018-06-19 PROCEDURE — 81001 URINALYSIS AUTO W/SCOPE: CPT | Performed by: EMERGENCY MEDICINE

## 2018-06-19 PROCEDURE — 36415 COLL VENOUS BLD VENIPUNCTURE: CPT | Performed by: EMERGENCY MEDICINE

## 2018-06-19 PROCEDURE — 74011000250 HC RX REV CODE- 250: Performed by: EMERGENCY MEDICINE

## 2018-06-19 PROCEDURE — 85025 COMPLETE CBC W/AUTO DIFF WBC: CPT | Performed by: EMERGENCY MEDICINE

## 2018-06-19 PROCEDURE — 83690 ASSAY OF LIPASE: CPT | Performed by: EMERGENCY MEDICINE

## 2018-06-19 PROCEDURE — 93005 ELECTROCARDIOGRAM TRACING: CPT

## 2018-06-19 PROCEDURE — 71046 X-RAY EXAM CHEST 2 VIEWS: CPT

## 2018-06-19 PROCEDURE — 74011250637 HC RX REV CODE- 250/637: Performed by: EMERGENCY MEDICINE

## 2018-06-19 RX ORDER — SODIUM CHLORIDE 0.9 % (FLUSH) 0.9 %
5-10 SYRINGE (ML) INJECTION EVERY 8 HOURS
Status: DISCONTINUED | OUTPATIENT
Start: 2018-06-19 | End: 2018-06-20

## 2018-06-19 RX ORDER — SODIUM CHLORIDE 0.9 % (FLUSH) 0.9 %
5-10 SYRINGE (ML) INJECTION AS NEEDED
Status: DISCONTINUED | OUTPATIENT
Start: 2018-06-19 | End: 2018-06-20

## 2018-06-19 RX ORDER — GUAIFENESIN 100 MG/5ML
324 LIQUID (ML) ORAL
Status: COMPLETED | OUTPATIENT
Start: 2018-06-19 | End: 2018-06-19

## 2018-06-19 RX ADMIN — LIDOCAINE HYDROCHLORIDE 40 ML: 20 SOLUTION ORAL; TOPICAL at 22:56

## 2018-06-19 RX ADMIN — Medication 10 ML: at 22:59

## 2018-06-19 RX ADMIN — ASPIRIN 324 MG: 81 TABLET, CHEWABLE ORAL at 22:56

## 2018-06-19 NOTE — TELEPHONE ENCOUNTER
Writer called office for Dr. Rad Salguero,   hematology oncology to set up an appointment r/t referral for pt with dx of papillary renal cell carcinoma. Per clinic they will send over a request for records and they then will call patient with an appointment. Writer called patient with no answer. Message left to return call so he can be made aware. Copy of referral mailed to patient.

## 2018-06-19 NOTE — TELEPHONE ENCOUNTER
----- Message from Matty Kuo sent at 6/19/2018  4:05 PM EDT -----  Regarding: /Telephone  Pt is returning a call    Best contact is [phone]

## 2018-06-19 NOTE — IP AVS SNAPSHOT
[address] 
[phone] Patient: Manjeet Bear MRN: [id number] MNC:[id number] About your hospitalization You were admitted on:  June 20, 2018 You last received care in the:  [facility] 2 INTRVNTNL CARDIO You were discharged on:  June 22, 2018 Why you were hospitalized Your primary diagnosis was:  Unstable Angina (Hcc) Follow-up Information Follow up With Details Comments Contact Info [facility] EMERGENCY DEPT  If symptoms worsen [address] [facility] 
[phone] Tan Gutierrez MD Schedule an appointment as soon as possible for a visit  [address] 
[address] [employer] [address] [facility] [address] 
[phone] Maureen Chew MD Schedule an appointment as soon as possible for a visit  [address] 
[phone] Luis Richey MD Go on 6/25/2018 2:00 [address] 
[phone] Discharge Orders None A check israel indicates which time of day the medication should be taken. My Medications START taking these medications Instructions Each Dose to Equal  
 Morning Noon Evening Bedtime  
 aspirin 325 mg tablet Commonly known as:  ASPIRIN Replaces:  aspirin delayed-release 81 mg tablet Your last dose was: Your next dose is: Take 1 Tab by mouth daily. 325 mg  
    
   
   
   
  
 clopidogrel 75 mg Tab Commonly known as:  PLAVIX Start taking on:  6/23/2018 Your last dose was: Your next dose is: Take 1 Tab by mouth daily. 75 mg CHANGE how you take these medications Instructions Each Dose to Equal  
 Morning Noon Evening Bedtime  
 atorvastatin 80 mg tablet Commonly known as:  LIPITOR Start taking on:  6/23/2018 What changed:  See the new instructions. Your last dose was: Your next dose is: Take 1 Tab by mouth daily. 80 mg  
    
   
   
   
  
 metoprolol tartrate 50 mg tablet Commonly known as:  LOPRESSOR What changed:   
- how much to take - when to take this Your last dose was: Your next dose is: Take 0.5 Tabs by mouth two (2) times a day. 25 mg CONTINUE taking these medications Instructions Each Dose to Equal  
 Morning Noon Evening Bedtime  
 escitalopram oxalate 10 mg tablet Commonly known as:  Louvella Milo Your last dose was: Your next dose is: TAKE 1 TABLET DAILY FOR    MOOD  
     
   
   
   
  
 LASIX 20 mg tablet Generic drug:  furosemide Your last dose was: Your next dose is: Take  by mouth daily. STOP taking these medications   
 aspirin delayed-release 81 mg tablet Replaced by:  aspirin 325 mg tablet Where to Get [facility] Medications [facility] [address] [address] Phone:  [phone]  
  metoprolol tartrate 50 mg tablet Information on where to get these meds will be given to you by the nurse or doctor. ! Ask your nurse or doctor about these medications  
  aspirin 325 mg tablet  
 atorvastatin 80 mg tablet  
 clopidogrel 75 mg Tab Discharge Instructions Cardiology Discharge Summary Smoking Cessation Program:  
This is a free, 
phone/text/email based, smoking cessation program. The program is individualized to meet each patient's needs. To enroll use this link [email or web address] Patient ID: 
Sima Dennis [id number] 
30 y.o. 
1960 Admit Date: 6/19/2018 Discharge Date: 6/22/2018 Admitting Physician: Lelia Garcia MD  
 
 Discharge Physician: Lucienne Holstein, MD 
 
 
 
Patient Instructions:  
 
 
Referenced discharge instructions provided by nursing for diet and activity. Follow-up with Dr Bonnie Hernandes 4 weeks [phone] Signed: 
Lucienne Holstein, MD 
6/22/2018 
8:58 AM 
 Cardiac Catheterization Discharge Instructions ? Do not drive, operate any machinery, or sign any legal documents for 24 hours after your procedure. You must have someone to drive you home. ? You may take a shower 24 hours after your cardiac catheterization. Be sure to get the dressing wet and then remove it; gently wash the area with warm soapy water. Pat dry and leave open to air. To help prevent infections, be sure to keep the cath site clean and dry. No lotions, creams, powders, ointments, etc. in the cath site for approximately 1 week. ? Do not take a tub bath, get in a hot tub or swimming pool for approximately 5 days or until the cath site is completely healed. ? No strenuous activity or heavy lifting over 10 lbs. for  2 days. ? Drink plenty of fluids for 24-48 hours after your cath to flush the contrast dye from your kidneys. No alcoholic beverages for 24 hours. You may resume your previous diet after your cath. ? After your cath, some bruising or discomfort is common during the healing process. Tylenol, 1-2 tablets every 6 hours as needed, is recommended if you experience any discomfort. If you experience any signs or symptoms of infection such as fever, chills, or poorly healing incision, persistent tenderness or swelling in the groin, redness and/or warmth to the touch, numbness, significant tingling or pain at the groin site or affected extremity, rash, drainage from the cath site, or if the leg feels tight or swollen, call your physician right away. ? If bleeding at the cath site occurs, take a clean gauze pad and apply direct pressure to the groin just above the puncture site.   Call [phone] immediately, and continue to apply direct pressure until an ambulance gets to your location. ? You may return to work  4  days after your cardiac cath. ? The day after your procedure, call your doctors office for a follow-up appointment in the office for  4 weeks,  unless previously arranged. Nurse Signature Date [address]   [phone] [facility] [address] [facility]    [email or web address]. [facility] Announcement We are excited to announce that we are making your provider's discharge notes available to you in [facility]. You will see these notes when they are completed and signed by the physician that discharged you from your recent hospital stay. If you have any questions or concerns about any information you see in [facility], please call the Health Information Department where you were seen or reach out to your Primary Care Provider for more information about your plan of care. Introducing [facility] & HEALTH SERVICES! Dear Tito Alfaro: Thank you for requesting a [facility] [facility] [employer] account. Our records indicate that you already have an active [facility] [facility] [employer] account. You can access your account anytime at [email or web address] Did you know that you can access your hospital and ER discharge instructions at any time in [facility] [facility] [employer]? You can also review all of your test results from your hospital stay or ER visit. Additional Information If you have questions, please visit the Frequently Asked Questions section of the [employer] [facility] [employer] website at [email or web address] Remember, [facility] is NOT to be used for urgent needs. For medical emergencies, dial 911. Now available from your iPhone and Android! Introducing [employer] As a [facility] patient, I wanted to make you aware of our electronic visit tool called Fly Weaver. [facility] 24/7 allows you to connect within minutes with a medical provider 24 hours a day, seven days a week via a mobile device or tablet or logging into a secure website from your computer. You can access Reble from anywhere in the [place]. A virtual visit might be right for you when you have a simple condition and feel like you just dont want to get out of bed, or cant get away from work for an appointment, when your regular [facility] provider is not available (evenings, weekends or holidays), or when youre out of town and need minor care. Electronic visits cost only $49 and if the [employer] provider determines a prescription is needed to treat your condition, one can be electronically transmitted to a nearby pharmacy*. Please take a moment to enroll today if you have not already done so. The enrollment process is free and takes just a few minutes. To enroll, please download the Handpay ladi to your tablet or phone, or visit [email or web address] to enroll on your computer. And, as an [address] [place] [address] patient with a Wonderswamp account, the results of your visits will be scanned into your electronic medical record and your primary care provider will be able to view the scanned results. We urge you to continue to see your regular [facility] provider for your ongoing medical care. And while your primary care provider may not be the one available when you seek a Fly Patelfin virtual visit, the peace of mind you get from getting a real diagnosis real time can be priceless. For more information on Fly Weaver, view our Frequently Asked Questions (FAQs) at [email or web address]. Sincerely, 
 
Tessa Nelson MD 
Chief Medical Officer Ross[employer] *:  certain medications cannot be prescribed via Fly MobileSnacknifin Unresulted tests-please follow up with your PCP on these results Procedure/Test Authorizing Provider  CBC WITH AUTOMATED DIFF Brad Oropeza MD  
 CBC WITH AUTOMATED DIFF April GINGER Melvin MD  
 CK W/ CKMB & INDEX April Thelma Louie MD  
 CK W/ [facility] CKMB April GINGER Melvin MD  
 CTA ABD PELV W WO CONT Eve Louie MD  
 CTA CHEST W OR W WO CONT Eve Louie MD  
 ECG RHYTHM ANALYSIS ADULT Karan Foley MD  
 EKG, 12 LEAD, INITIAL Karan Foley MD  
 EKG, 12 LEAD, INITIAL Liz Peña MD  
 EKG, 12 LEAD, INITIAL Caridad Burleson MD  
 EKG, 12 LEAD, SUBSEQUENT April GINGER Melvin MD  
 LIPASE Julia Guerra MD  
 MAGNESIUM Yohan Kunz MD  
 METABOLIC PANEL, BASIC Alberto Vega MD  
 METABOLIC PANEL, Polly Fox MD  
 METABOLIC PANEL, Jermaine Pham MD  
 METABOLIC PANEL, COMPREHENSIVE April Thelma Louie MD  
 PHOSPHORUS Yohan Kunz MD  
 TROPONIN I Eve Louie MD  
 TROPONIN I April GINGER Melvin MD  
 TROPONIN I Yohan Kunz MD  
 TROPONIN I Yohan Kunz MD  
 URINALYSIS W/ REFLEX CULTURE Julia Guerra MD  
 XR CHEST PA LAT Eve Louie MD  
  
Providers Seen During Your Hospitalization Provider Specialty Primary office phone Eve Louie MD Emergency Medicine [phone] Caridad Burleson MD Hospitalist [phone] Jw Ruiz MD Hospitalist [phone] Your Primary Care Physician (PCP) Primary Care Physician Office Phone Office Fax Jacob Crawford [phone] You are allergic to the following No active allergies Recent Documentation Height Weight BMI Smoking Status 1.727 m 99.8 kg 33.45 kg/m2 Current Every Day Smoker Emergency Contacts Name Discharge Info Relation Home Work Mobile Belkys Jo DISCHARGE CAREGIVER [3] Spouse [3] [phone] Patient Belongings  The following personal items are in your possession at time of discharge: 
  Dental Appliances: None  Visual Aid: None      Home Medications: None Jewelry: Watch (sent home)  Clothing: Pants, Hat, Footwear, Undergarments, Shirt    Other Valuables: None Please provide this summary of care documentation to your next provider. Signatures-by signing, you are acknowledging that this After Visit Summary has been reviewed with you and you have received a copy. Patient Signature:  ____________________________________________________________ Date:  ____________________________________________________________  
  
Sissy Lapine Provider Signature:  ____________________________________________________________ Date:  ____________________________________________________________

## 2018-06-20 ENCOUNTER — APPOINTMENT (OUTPATIENT)
Dept: CT IMAGING | Age: 58
DRG: 247 | End: 2018-06-20
Attending: EMERGENCY MEDICINE
Payer: COMMERCIAL

## 2018-06-20 PROBLEM — I20.0 UNSTABLE ANGINA (HCC): Status: ACTIVE | Noted: 2018-06-20

## 2018-06-20 LAB
ANION GAP SERPL CALC-SCNC: 8 MMOL/L (ref 5–15)
ATRIAL RATE: 55 BPM
ATRIAL RATE: 64 BPM
ATRIAL RATE: 68 BPM
BUN SERPL-MCNC: 13 MG/DL (ref 6–20)
BUN/CREAT SERPL: 14 (ref 12–20)
CALCIUM SERPL-MCNC: 8.6 MG/DL (ref 8.5–10.1)
CALCULATED P AXIS, ECG09: 13 DEGREES
CALCULATED P AXIS, ECG09: 18 DEGREES
CALCULATED P AXIS, ECG09: 56 DEGREES
CALCULATED R AXIS, ECG10: 51 DEGREES
CALCULATED R AXIS, ECG10: 53 DEGREES
CALCULATED R AXIS, ECG10: 53 DEGREES
CALCULATED T AXIS, ECG11: 47 DEGREES
CALCULATED T AXIS, ECG11: 50 DEGREES
CALCULATED T AXIS, ECG11: 53 DEGREES
CHLORIDE SERPL-SCNC: 111 MMOL/L (ref 97–108)
CK MB CFR SERPL CALC: 1.4 % (ref 0–2.5)
CK MB SERPL-MCNC: 1.4 NG/ML (ref 5–25)
CK SERPL-CCNC: 102 U/L (ref 39–308)
CO2 SERPL-SCNC: 25 MMOL/L (ref 21–32)
CREAT SERPL-MCNC: 0.95 MG/DL (ref 0.7–1.3)
DIAGNOSIS, 93000: NORMAL
GLUCOSE SERPL-MCNC: 108 MG/DL (ref 65–100)
MAGNESIUM SERPL-MCNC: 2.1 MG/DL (ref 1.6–2.4)
P-R INTERVAL, ECG05: 140 MS
P-R INTERVAL, ECG05: 152 MS
P-R INTERVAL, ECG05: 162 MS
PHOSPHATE SERPL-MCNC: 2.6 MG/DL (ref 2.6–4.7)
POTASSIUM SERPL-SCNC: 3.9 MMOL/L (ref 3.5–5.1)
Q-T INTERVAL, ECG07: 412 MS
Q-T INTERVAL, ECG07: 434 MS
Q-T INTERVAL, ECG07: 454 MS
QRS DURATION, ECG06: 100 MS
QRS DURATION, ECG06: 102 MS
QRS DURATION, ECG06: 106 MS
QTC CALCULATION (BEZET), ECG08: 434 MS
QTC CALCULATION (BEZET), ECG08: 438 MS
QTC CALCULATION (BEZET), ECG08: 447 MS
SODIUM SERPL-SCNC: 144 MMOL/L (ref 136–145)
TROPONIN I SERPL-MCNC: 0.62 NG/ML
TROPONIN I SERPL-MCNC: 3.23 NG/ML
TROPONIN I SERPL-MCNC: <0.05 NG/ML
VENTRICULAR RATE, ECG03: 55 BPM
VENTRICULAR RATE, ECG03: 64 BPM
VENTRICULAR RATE, ECG03: 68 BPM

## 2018-06-20 PROCEDURE — 77030028837 HC SYR ANGI PWR INJ COEU -A

## 2018-06-20 PROCEDURE — 77030029065 HC DRSG HEMO QCLOT ZMED -B

## 2018-06-20 PROCEDURE — 74011250636 HC RX REV CODE- 250/636: Performed by: INTERNAL MEDICINE

## 2018-06-20 PROCEDURE — 74011636320 HC RX REV CODE- 636/320: Performed by: INTERNAL MEDICINE

## 2018-06-20 PROCEDURE — 36415 COLL VENOUS BLD VENIPUNCTURE: CPT | Performed by: HOSPITALIST

## 2018-06-20 PROCEDURE — C1769 GUIDE WIRE: HCPCS

## 2018-06-20 PROCEDURE — 84100 ASSAY OF PHOSPHORUS: CPT | Performed by: HOSPITALIST

## 2018-06-20 PROCEDURE — 84484 ASSAY OF TROPONIN QUANT: CPT | Performed by: EMERGENCY MEDICINE

## 2018-06-20 PROCEDURE — 74011250636 HC RX REV CODE- 250/636

## 2018-06-20 PROCEDURE — 74011250636 HC RX REV CODE- 250/636: Performed by: EMERGENCY MEDICINE

## 2018-06-20 PROCEDURE — 4A023N7 MEASUREMENT OF CARDIAC SAMPLING AND PRESSURE, LEFT HEART, PERCUTANEOUS APPROACH: ICD-10-PCS | Performed by: INTERNAL MEDICINE

## 2018-06-20 PROCEDURE — 74011250636 HC RX REV CODE- 250/636: Performed by: HOSPITALIST

## 2018-06-20 PROCEDURE — 96374 THER/PROPH/DIAG INJ IV PUSH: CPT

## 2018-06-20 PROCEDURE — B2111ZZ FLUOROSCOPY OF MULTIPLE CORONARY ARTERIES USING LOW OSMOLAR CONTRAST: ICD-10-PCS | Performed by: INTERNAL MEDICINE

## 2018-06-20 PROCEDURE — 80048 BASIC METABOLIC PNL TOTAL CA: CPT | Performed by: HOSPITALIST

## 2018-06-20 PROCEDURE — 93005 ELECTROCARDIOGRAM TRACING: CPT

## 2018-06-20 PROCEDURE — 83735 ASSAY OF MAGNESIUM: CPT | Performed by: HOSPITALIST

## 2018-06-20 PROCEDURE — 71275 CT ANGIOGRAPHY CHEST: CPT

## 2018-06-20 PROCEDURE — C1894 INTRO/SHEATH, NON-LASER: HCPCS

## 2018-06-20 PROCEDURE — 74011636320 HC RX REV CODE- 636/320: Performed by: EMERGENCY MEDICINE

## 2018-06-20 PROCEDURE — 74011636320 HC RX REV CODE- 636/320

## 2018-06-20 PROCEDURE — 99153 MOD SED SAME PHYS/QHP EA: CPT

## 2018-06-20 PROCEDURE — 74011250637 HC RX REV CODE- 250/637: Performed by: INTERNAL MEDICINE

## 2018-06-20 PROCEDURE — 65660000000 HC RM CCU STEPDOWN

## 2018-06-20 PROCEDURE — 82550 ASSAY OF CK (CPK): CPT | Performed by: EMERGENCY MEDICINE

## 2018-06-20 PROCEDURE — 74011250637 HC RX REV CODE- 250/637: Performed by: EMERGENCY MEDICINE

## 2018-06-20 PROCEDURE — 96361 HYDRATE IV INFUSION ADD-ON: CPT

## 2018-06-20 PROCEDURE — 74011000250 HC RX REV CODE- 250

## 2018-06-20 PROCEDURE — 74011250637 HC RX REV CODE- 250/637: Performed by: HOSPITALIST

## 2018-06-20 PROCEDURE — B2151ZZ FLUOROSCOPY OF LEFT HEART USING LOW OSMOLAR CONTRAST: ICD-10-PCS | Performed by: INTERNAL MEDICINE

## 2018-06-20 PROCEDURE — 74174 CTA ABD&PLVS W/CONTRAST: CPT

## 2018-06-20 PROCEDURE — 96376 TX/PRO/DX INJ SAME DRUG ADON: CPT

## 2018-06-20 RX ORDER — HEPARIN SODIUM 200 [USP'U]/100ML
500 INJECTION, SOLUTION INTRAVENOUS ONCE
Status: COMPLETED | OUTPATIENT
Start: 2018-06-20 | End: 2018-06-20

## 2018-06-20 RX ORDER — HEPARIN SODIUM 200 [USP'U]/100ML
500 INJECTION, SOLUTION INTRAVENOUS ONCE
Status: DISCONTINUED | OUTPATIENT
Start: 2018-06-20 | End: 2018-06-20 | Stop reason: SDUPTHER

## 2018-06-20 RX ORDER — NITROGLYCERIN 0.4 MG/1
0.4 TABLET SUBLINGUAL
Status: DISCONTINUED | OUTPATIENT
Start: 2018-06-20 | End: 2018-06-20

## 2018-06-20 RX ORDER — ONDANSETRON 2 MG/ML
4 INJECTION INTRAMUSCULAR; INTRAVENOUS
Status: COMPLETED | OUTPATIENT
Start: 2018-06-20 | End: 2018-06-20

## 2018-06-20 RX ORDER — MIDAZOLAM HYDROCHLORIDE 1 MG/ML
.5-2 INJECTION, SOLUTION INTRAMUSCULAR; INTRAVENOUS
Status: DISCONTINUED | OUTPATIENT
Start: 2018-06-20 | End: 2018-06-20

## 2018-06-20 RX ORDER — SODIUM CHLORIDE 9 MG/ML
75 INJECTION, SOLUTION INTRAVENOUS CONTINUOUS
Status: DISCONTINUED | OUTPATIENT
Start: 2018-06-20 | End: 2018-06-20

## 2018-06-20 RX ORDER — NALOXONE HYDROCHLORIDE 0.4 MG/ML
0.4 INJECTION, SOLUTION INTRAMUSCULAR; INTRAVENOUS; SUBCUTANEOUS AS NEEDED
Status: DISCONTINUED | OUTPATIENT
Start: 2018-06-20 | End: 2018-06-22 | Stop reason: HOSPADM

## 2018-06-20 RX ORDER — SODIUM CHLORIDE 0.9 % (FLUSH) 0.9 %
5-10 SYRINGE (ML) INJECTION EVERY 8 HOURS
Status: DISCONTINUED | OUTPATIENT
Start: 2018-06-20 | End: 2018-06-21 | Stop reason: SDUPTHER

## 2018-06-20 RX ORDER — METOPROLOL SUCCINATE 25 MG/1
25 TABLET, EXTENDED RELEASE ORAL DAILY
Status: DISCONTINUED | OUTPATIENT
Start: 2018-06-21 | End: 2018-06-22 | Stop reason: HOSPADM

## 2018-06-20 RX ORDER — SODIUM CHLORIDE 0.9 % (FLUSH) 0.9 %
10 SYRINGE (ML) INJECTION
Status: COMPLETED | OUTPATIENT
Start: 2018-06-20 | End: 2018-06-20

## 2018-06-20 RX ORDER — ONDANSETRON 2 MG/ML
4 INJECTION INTRAMUSCULAR; INTRAVENOUS
Status: DISCONTINUED | OUTPATIENT
Start: 2018-06-20 | End: 2018-06-22 | Stop reason: HOSPADM

## 2018-06-20 RX ORDER — ATORVASTATIN CALCIUM 40 MG/1
40 TABLET, FILM COATED ORAL DAILY
Status: DISCONTINUED | OUTPATIENT
Start: 2018-06-20 | End: 2018-06-20

## 2018-06-20 RX ORDER — FENTANYL CITRATE 50 UG/ML
25-50 INJECTION, SOLUTION INTRAMUSCULAR; INTRAVENOUS
Status: DISCONTINUED | OUTPATIENT
Start: 2018-06-20 | End: 2018-06-20

## 2018-06-20 RX ORDER — SODIUM CHLORIDE 9 MG/ML
100 INJECTION, SOLUTION INTRAVENOUS CONTINUOUS
Status: DISPENSED | OUTPATIENT
Start: 2018-06-20 | End: 2018-06-20

## 2018-06-20 RX ORDER — HEPARIN SODIUM 200 [USP'U]/100ML
INJECTION, SOLUTION INTRAVENOUS
Status: DISCONTINUED
Start: 2018-06-20 | End: 2018-06-21

## 2018-06-20 RX ORDER — ACETAMINOPHEN 325 MG/1
650 TABLET ORAL
Status: DISCONTINUED | OUTPATIENT
Start: 2018-06-20 | End: 2018-06-22 | Stop reason: HOSPADM

## 2018-06-20 RX ORDER — SODIUM CHLORIDE 0.9 % (FLUSH) 0.9 %
5-10 SYRINGE (ML) INJECTION AS NEEDED
Status: DISCONTINUED | OUTPATIENT
Start: 2018-06-20 | End: 2018-06-21 | Stop reason: SDUPTHER

## 2018-06-20 RX ORDER — METOPROLOL TARTRATE 50 MG/1
50 TABLET ORAL DAILY
Status: DISCONTINUED | OUTPATIENT
Start: 2018-06-20 | End: 2018-06-20

## 2018-06-20 RX ORDER — ATORVASTATIN CALCIUM 40 MG/1
80 TABLET, FILM COATED ORAL DAILY
Status: DISCONTINUED | OUTPATIENT
Start: 2018-06-21 | End: 2018-06-22 | Stop reason: HOSPADM

## 2018-06-20 RX ORDER — ACETAMINOPHEN 325 MG/1
650 TABLET ORAL
Status: DISCONTINUED | OUTPATIENT
Start: 2018-06-20 | End: 2018-06-21 | Stop reason: SDUPTHER

## 2018-06-20 RX ORDER — SODIUM CHLORIDE 9 MG/ML
50 INJECTION, SOLUTION INTRAVENOUS
Status: COMPLETED | OUTPATIENT
Start: 2018-06-20 | End: 2018-06-20

## 2018-06-20 RX ORDER — LIDOCAINE HYDROCHLORIDE 10 MG/ML
1-20 INJECTION INFILTRATION; PERINEURAL
Status: DISCONTINUED | OUTPATIENT
Start: 2018-06-20 | End: 2018-06-20

## 2018-06-20 RX ORDER — LIDOCAINE HYDROCHLORIDE 10 MG/ML
1-30 INJECTION, SOLUTION EPIDURAL; INFILTRATION; INTRACAUDAL; PERINEURAL
Status: DISCONTINUED | OUTPATIENT
Start: 2018-06-20 | End: 2018-06-20

## 2018-06-20 RX ORDER — SODIUM CHLORIDE 0.9 % (FLUSH) 0.9 %
5-10 SYRINGE (ML) INJECTION AS NEEDED
Status: DISCONTINUED | OUTPATIENT
Start: 2018-06-20 | End: 2018-06-22 | Stop reason: HOSPADM

## 2018-06-20 RX ORDER — SODIUM CHLORIDE 0.9 % (FLUSH) 0.9 %
5-10 SYRINGE (ML) INJECTION EVERY 8 HOURS
Status: DISCONTINUED | OUTPATIENT
Start: 2018-06-20 | End: 2018-06-22 | Stop reason: HOSPADM

## 2018-06-20 RX ORDER — ESCITALOPRAM OXALATE 10 MG/1
10 TABLET ORAL DAILY
Status: DISCONTINUED | OUTPATIENT
Start: 2018-06-20 | End: 2018-06-22 | Stop reason: HOSPADM

## 2018-06-20 RX ORDER — ASPIRIN 81 MG/1
81 TABLET ORAL DAILY
Status: DISCONTINUED | OUTPATIENT
Start: 2018-06-20 | End: 2018-06-22 | Stop reason: HOSPADM

## 2018-06-20 RX ORDER — FENTANYL CITRATE 50 UG/ML
INJECTION, SOLUTION INTRAMUSCULAR; INTRAVENOUS
Status: DISCONTINUED
Start: 2018-06-20 | End: 2018-06-22 | Stop reason: HOSPADM

## 2018-06-20 RX ORDER — CLOPIDOGREL 300 MG/1
600 TABLET, FILM COATED ORAL ONCE
Status: COMPLETED | OUTPATIENT
Start: 2018-06-20 | End: 2018-06-20

## 2018-06-20 RX ORDER — LIDOCAINE HYDROCHLORIDE 10 MG/ML
INJECTION, SOLUTION EPIDURAL; INFILTRATION; INTRACAUDAL; PERINEURAL
Status: COMPLETED
Start: 2018-06-20 | End: 2018-06-20

## 2018-06-20 RX ORDER — ENOXAPARIN SODIUM 100 MG/ML
1 INJECTION SUBCUTANEOUS EVERY 12 HOURS
Status: DISCONTINUED | OUTPATIENT
Start: 2018-06-20 | End: 2018-06-20

## 2018-06-20 RX ORDER — ONDANSETRON 2 MG/ML
INJECTION INTRAMUSCULAR; INTRAVENOUS
Status: COMPLETED
Start: 2018-06-20 | End: 2018-06-20

## 2018-06-20 RX ORDER — NITROGLYCERIN 0.4 MG/1
TABLET SUBLINGUAL
Status: DISCONTINUED
Start: 2018-06-20 | End: 2018-06-20

## 2018-06-20 RX ORDER — MIDAZOLAM HYDROCHLORIDE 1 MG/ML
INJECTION, SOLUTION INTRAMUSCULAR; INTRAVENOUS
Status: DISCONTINUED
Start: 2018-06-20 | End: 2018-06-22 | Stop reason: HOSPADM

## 2018-06-20 RX ADMIN — NITROGLYCERIN 0.4 MG: 0.4 TABLET SUBLINGUAL at 04:21

## 2018-06-20 RX ADMIN — NITROGLYCERIN 1 INCH: 20 OINTMENT TOPICAL at 11:07

## 2018-06-20 RX ADMIN — ONDANSETRON HYDROCHLORIDE 4 MG: 2 INJECTION, SOLUTION INTRAVENOUS at 04:15

## 2018-06-20 RX ADMIN — ONDANSETRON 4 MG: 2 INJECTION, SOLUTION INTRAMUSCULAR; INTRAVENOUS at 00:54

## 2018-06-20 RX ADMIN — Medication 10 ML: at 01:48

## 2018-06-20 RX ADMIN — HEPARIN SODIUM 1000 UNITS: 200 INJECTION, SOLUTION INTRAVENOUS at 13:34

## 2018-06-20 RX ADMIN — LIDOCAINE HYDROCHLORIDE 20 ML: 10 INJECTION, SOLUTION EPIDURAL; INFILTRATION; INTRACAUDAL; PERINEURAL at 13:33

## 2018-06-20 RX ADMIN — Medication 10 ML: at 22:46

## 2018-06-20 RX ADMIN — ONDANSETRON 4 MG: 2 INJECTION INTRAMUSCULAR; INTRAVENOUS at 00:54

## 2018-06-20 RX ADMIN — IOPAMIDOL 100 ML: 755 INJECTION, SOLUTION INTRAVENOUS at 01:48

## 2018-06-20 RX ADMIN — MIDAZOLAM HYDROCHLORIDE 1 MG: 1 INJECTION, SOLUTION INTRAMUSCULAR; INTRAVENOUS at 13:17

## 2018-06-20 RX ADMIN — SODIUM CHLORIDE 75 ML/HR: 0.9 INJECTION, SOLUTION INTRAVENOUS at 04:21

## 2018-06-20 RX ADMIN — CLOPIDOGREL BISULFATE 600 MG: 300 TABLET, FILM COATED ORAL at 22:47

## 2018-06-20 RX ADMIN — ENOXAPARIN SODIUM 100 MG: 100 INJECTION SUBCUTANEOUS at 06:09

## 2018-06-20 RX ADMIN — IOPAMIDOL 39 ML: 755 INJECTION, SOLUTION INTRAVENOUS at 13:39

## 2018-06-20 RX ADMIN — FENTANYL CITRATE 25 MCG: 50 INJECTION, SOLUTION INTRAMUSCULAR; INTRAVENOUS at 13:17

## 2018-06-20 RX ADMIN — SODIUM CHLORIDE 500 ML: 900 INJECTION, SOLUTION INTRAVENOUS at 04:21

## 2018-06-20 RX ADMIN — IOPAMIDOL 65 ML: 755 INJECTION, SOLUTION INTRAVENOUS at 14:04

## 2018-06-20 RX ADMIN — ATORVASTATIN CALCIUM 40 MG: 40 TABLET, FILM COATED ORAL at 11:07

## 2018-06-20 RX ADMIN — HEPARIN SODIUM 1000 UNITS: 200 INJECTION, SOLUTION INTRAVENOUS at 13:33

## 2018-06-20 RX ADMIN — SODIUM CHLORIDE 50 ML/HR: 900 INJECTION, SOLUTION INTRAVENOUS at 01:48

## 2018-06-20 RX ADMIN — ASPIRIN 81 MG: 81 TABLET, COATED ORAL at 09:38

## 2018-06-20 RX ADMIN — Medication 10 ML: at 08:08

## 2018-06-20 RX ADMIN — SODIUM CHLORIDE 100 ML/HR: 900 INJECTION, SOLUTION INTRAVENOUS at 15:00

## 2018-06-20 NOTE — PROGRESS NOTES
TRANSFER - IN REPORT:    Verbal report received from Kali De La Fuente RN on [employer]  being received from [facility] for routine progression of care. Report consisted of patients Situation, Background, Assessment and Recommendations(SBAR). Information from the following report(s) Procedure Summary and MAR was reviewed with the receiving clinician. Opportunity for questions and clarification was provided. Assessment completed upon patients arrival to [address] [place] [address] and care [facility]. Cardiac Cath Lab Recovery Arrival Note:    [employer] arrived to [facility] recovery area. Patient procedure= LHC. Patient on cardiac monitor, non-invasive blood pressure, SPO2 monitor. On room air. IV  of ns on pump at 50 ml/hr. Patient status doing well without problems. Patient is A&Ox 3. Patient reports no c/o. PROCEDURE SITE CHECK:    Procedure site:without any bleeding and no hematoma, no pain/discomfort reported at procedure site. No change in patient status. Continue to monitor patient and status.

## 2018-06-20 NOTE — H&P
Hospitalist Admission Note    NAME: Kirsten Avilez   :  1960   MRN:  [id number]     Date/Time:  2018 4:29 AM    Patient PCP: Ezra Castaneda MD  ______________________________________________________________________  Given the patient's current clinical presentation, I have a high level of concern for decompensation if discharged from the emergency department. Complex decision making was performed, which includes reviewing the patient's available past medical records, laboratory results, and x-ray films. My assessment of this patient's clinical condition and my plan of care is as follows. Assessment / Plan:  Unstable angina with h/o CAD/PTCA  HTN   -admit to step down unit for close monitoring   -intermittent CP; BP in 110s  -start scheduled NTP  -c/w therapeutic lovenox   -cardiology consulted from ED  -will keep NPO in case card cath will be done today   -cont home ASA/BB ( with parameters to hold)   -had recent ECHO : EF 55% , no hypokinesis   -CTA chest and abd: No acute findings. Left Spigelian hernia.  -Primary cardiology: Dr Shameka Baugh     Hyperlipidemia   -cont statin     Right renal papillary cell carcinoma  -Dx , scheduled to see Dr Josue Gunn   -CT: Hypoenhancing right lower pole renal mass is consistent with papillary renal  cell carcinoma. H/o AAA, repaired           Code Status: full code   Surrogate Decision Maker: wife     DVT Prophylaxis: lovenox   GI Prophylaxis: not indicated    Baseline: independent       Subjective:   CHIEF COMPLAINT: chest pain     HISTORY OF PRESENT ILLNESS:     Shira Langley is a 62 y.o.  male who presents with above complaint. Chest pain started after dinner yesterday. Pain is pressure or burning, mid sternal, 5-9/10, radiating to BL arms and jaw. He denies dyspnea/fever/chills/dizziness. He had similar episode on Monday which also started after eating dinner.  On Monday episode lasted for approximately one hour and resolved on its own. Yesterday pain continue for couple of hours and pt decided to be evaluated in ED. He had significant cardiac history. He also noted one short episode of palpitation last night. On presentation to ED he was CP free. However, later on while still in ED pain started again. Pt reports taking TUMS at home without any relief of the pain. In ED he was treated with NTG with some relieve to his pain. ED provider discussed case with cardiology who recommended admission. Pt followed by Dr Rosalee De La Torre. He was last seen 3 months ago for regular follow up. Recent ECHO was done by PCP to follow EF. Off note, pt was recently diagnosed with right kidney carcinoma and scheduled to see [place] next Monday. Vs: 98.3 °F (36.8 °C) - 66 - 146/86 - 16 -100% RA     We were asked to admit for work up and evaluation of the above problems. Past Medical History:   Diagnosis Date    Anxiety     CAD (coronary artery disease)     PTCA dr roger forte   2/19/16 note/ekg    CHF (congestive heart failure) ([facility] [address] 2/2016    well compenstated per notes    Diverticulitis     with colon resection    Finger pain, right     Index finger- started 2 years- Painful if hit.     History of chicken pox     Hypercholesteremia     Hypertension     Insomnia     Knee pain 2009    ortho for cortisone injection    Papillary renal cell carcinoma ([address] based on CT result 6/2018 6/8/2018    Prediabetes     Screening cholesterol level 02/12/15    [facility]  and again 2/2016    Vitamin D deficiency     again 8/2014 again 12/2016        Past Surgical History:   Procedure Laterality Date    ABDOMEN SURGERY PROC UNLISTED      aaa,inguinal hernia, hemicolectomy    CARDIAC SURG PROCEDURE UNLIST  2005    ptca and stents    COLONOSCOPY  9/06    dr Garza Argue- normal repeat 10 years    EGD  9/06     dr Avery Matamoros, COLON, DIAGNOSTIC  9/2006    HX COLECTOMY      for ruptured diverticulitis    HX COLONOSCOPY  01/10/2017    10 yr repeat    HX OTHER SURGICAL  3/17/16    exercise strss test report [place]       Social History   Substance Use Topics    Smoking status: Current Every Day Smoker     Packs/day: 0.25     Types: Cigarettes    Smokeless tobacco: Never Used    Alcohol use Yes      Comment: rare social        Family History   Problem Relation Age of Onset    Diabetes Mother     Heart Disease Father      No Known Allergies     Prior to Admission medications    Medication Sig Start Date End Date Taking? Authorizing Provider   escitalopram oxalate (LEXAPRO) 10 mg tablet TAKE 1 TABLET DAILY FOR    MOOD 4/4/18   Jarvis Tan NP   atorvastatin (LIPITOR) 40 mg tablet TAKE ONE TABLET BY MOUTH ONE TIME DAILY  10/23/15   Maria T Campos MD   metoprolol (LOPRESSOR) 50 mg tablet Take 1 Tab by mouth daily. 7/11/11   Freya Villalta DO   furosemide (LASIX) 20 mg tablet Take  by mouth daily. Historical Provider   aspirin delayed-release 81 mg tablet Take 81 mg by mouth daily. Historical Provider       REVIEW OF SYSTEMS:     I am not able to complete the review of systems because:    The patient is intubated and sedated    The patient has altered mental status due to his acute medical problems    The patient has baseline aphasia from prior stroke(s)    The patient has baseline dementia and is not reliable historian    The patient is in acute medical distress and unable to provide information           Total of 12 systems reviewed as follows:       POSITIVE= underlined text  Negative = text not underlined  General:  fever, chills, sweats, generalized weakness, weight loss/gain,      loss of appetite   Eyes:    blurred vision, eye pain, loss of vision, double vision  ENT:    rhinorrhea, pharyngitis   Respiratory:   cough, sputum production, SOB, RAMIREZ, wheezing, pleuritic pain   Cardiology:   chest pain, palpitations, orthopnea, PND, edema, syncope   Gastrointestinal:  abdominal pain , N/V, diarrhea, dysphagia, constipation, bleeding   Genitourinary:  frequency, urgency, dysuria, hematuria, incontinence   Muskuloskeletal :  arthralgia, myalgia, back pain  Hematology:  easy bruising, nose or gum bleeding, lymphadenopathy   Dermatological: rash, ulceration, pruritis, color change / jaundice  Endocrine:   hot flashes or polydipsia   Neurological:  headache, dizziness, confusion, focal weakness, paresthesia,     Speech difficulties, memory loss, gait difficulty  Psychological: Feelings of anxiety, depression, agitation    Objective:   VITALS:    Visit Vitals    /82    Pulse 64    Temp 98.3 °F (36.8 °C)    Resp 17    Ht 5' 8\" (1.727 m)    Wt 101.2 kg (223 lb 1.7 oz)    SpO2 98%    BMI 33.92 kg/m2       PHYSICAL EXAM:    General:    Alert, cooperative, no distress, appears stated age. HEENT: Atraumatic, anicteric sclerae, pink conjunctivae     No oral ulcers, mucosa moist, throat clear, dentition fair  Neck:  Supple, symmetrical,  thyroid: non tender  Lungs:   Clear to auscultation bilaterally. No Wheezing or Rhonchi. No rales. Chest wall:  No tenderness  No Accessory muscle use. Heart:   Regular  rhythm,  No  murmur   No edema  Abdomen:   Soft, non-tender. Not distended. Bowel sounds normal  Extremities: No cyanosis. No clubbing,      Skin turgor normal, Capillary refill normal, Radial dial pulse 2+  Skin:     Not pale. Not Jaundiced  No rashes   Psych:  Good insight. Not depressed. Not anxious or agitated. Neurologic: EOMs intact. No facial asymmetry. No aphasia or slurred speech. Symmetrical strength, Sensation grossly intact.  Alert and oriented X 4.     _______________________________________________________________________  Care Plan discussed with:    Comments   Patient y    Family      RN y    Care Manager                    Consultant:  [facility] ED provider    _______________________________________________________________________  Expected  Disposition:   Home with Family y   HH/PT/OT/RN SNF/LTC    BARRERA    ________________________________________________________________________  TOTAL TIME:  72 Minutes    Critical Care Provided     Minutes non procedure based      Comments    y Reviewed previous records, recent w/u from PCP revealed kidney cancer     y Discussion with patient and/or family and questions answered       ________________________________________________________________________  Signed: Sunitha Villanueva MD    Procedures: see electronic medical records for all procedures/Xrays and details which were not copied into this note but were reviewed prior to creation of Plan. LAB DATA REVIEWED:    Recent Results (from the past 24 hour(s))   EKG, 12 LEAD, INITIAL    Collection Time: 06/19/18 10:26 PM   Result Value Ref Range    Ventricular Rate 68 BPM    Atrial Rate 68 BPM    P-R Interval 152 ms    QRS Duration 106 ms    Q-T Interval 412 ms    QTC Calculation (Bezet) 438 ms    Calculated P Axis 56 degrees    Calculated R Axis 53 degrees    Calculated T Axis 47 degrees    Diagnosis       Normal sinus rhythm  Normal ECG  When compared with ECG of 08-AUG-2009 21:29,  No significant change was found     CBC WITH AUTOMATED DIFF    Collection Time: 06/19/18 10:30 PM   Result Value Ref Range    WBC 8.6 4.1 - 11.1 K/uL    RBC 4.93 4.10 - 5.70 M/uL    HGB 14.4 12.1 - 17.0 g/dL    HCT 44.3 36.6 - 50.3 %    MCV 89.9 80.0 - 99.0 FL    MCH 29.2 26.0 - 34.0 PG    MCHC 32.5 30.0 - 36.5 g/dL    RDW 13.6 11.5 - 14.5 %    PLATELET 322 544 - 739 K/uL    MPV 11.2 8.9 - 12.9 FL    NRBC 0.0 0  WBC    ABSOLUTE NRBC 0.00 0.00 - 0.01 K/uL    NEUTROPHILS 59 32 - 75 %    LYMPHOCYTES 28 12 - 49 %    MONOCYTES 9 5 - 13 %    EOSINOPHILS 3 0 - 7 %    BASOPHILS 1 0 - 1 %    IMMATURE GRANULOCYTES 1 (H) 0.0 - 0.5 %    ABS. NEUTROPHILS 5.1 1.8 - 8.0 K/UL    ABS. LYMPHOCYTES 2.4 0.8 - 3.5 K/UL    ABS. MONOCYTES 0.7 0.0 - 1.0 K/UL    ABS. EOSINOPHILS 0.2 0.0 - 0.4 K/UL    ABS. BASOPHILS 0.1 0.0 - 0.1 K/UL    ABS. IMM. GRANS. 0.1 (H) 0.00 - 0.04 K/UL    DF AUTOMATED     METABOLIC PANEL, COMPREHENSIVE    Collection Time: 06/19/18 10:30 PM   Result Value Ref Range    Sodium 144 136 - 145 mmol/L    Potassium 3.9 3.5 - 5.1 mmol/L    Chloride 109 (H) 97 - 108 mmol/L    CO2 28 21 - 32 mmol/L    Anion gap 7 5 - 15 mmol/L    Glucose 122 (H) 65 - 100 mg/dL    BUN 14 6 - 20 MG/DL    Creatinine 1.02 0.70 - 1.30 MG/DL    BUN/Creatinine ratio 14 12 - 20      GFR est AA >60 >60 ml/min/1.73m2    GFR est non-AA >60 >60 ml/min/1.73m2    Calcium 9.3 8.5 - 10.1 MG/DL    Bilirubin, total 0.2 0.2 - 1.0 MG/DL    ALT (SGPT) 29 12 - 78 U/L    AST (SGOT) 15 15 - 37 U/L    Alk.  phosphatase 88 45 - 117 U/L    Protein, total 7.4 6.4 - 8.2 g/dL    Albumin 3.8 3.5 - 5.0 g/dL    Globulin 3.6 2.0 - 4.0 g/dL    A-G Ratio 1.1 1.1 - 2.2     TROPONIN I    Collection Time: 06/19/18 10:30 PM   Result Value Ref Range    Troponin-I, Qt. <0.05 <0.05 ng/mL   CK W/ REFLX CKMB    Collection Time: 06/19/18 10:30 PM   Result Value Ref Range     39 - 308 U/L   LIPASE    Collection Time: 06/19/18 10:30 PM   Result Value Ref Range    Lipase 323 73 - 393 U/L   URINALYSIS W/ REFLEX CULTURE    Collection Time: 06/19/18 11:19 PM   Result Value Ref Range    Color YELLOW/STRAW      Appearance CLEAR CLEAR      Specific gravity 1.022 1.003 - 1.030      pH (UA) 6.0 5.0 - 8.0      Protein NEGATIVE  NEG mg/dL    Glucose NEGATIVE  NEG mg/dL    Ketone NEGATIVE  NEG mg/dL    Bilirubin NEGATIVE  NEG      Blood NEGATIVE  NEG      Urobilinogen 1.0 0.2 - 1.0 EU/dL    Nitrites NEGATIVE  NEG      Leukocyte Esterase NEGATIVE  NEG      WBC 0-4 0 - 4 /hpf    RBC 0-5 0 - 5 /hpf    Epithelial cells FEW FEW /lpf    Bacteria NEGATIVE  NEG /hpf    UA:UC IF INDICATED CULTURE NOT INDICATED BY UA RESULT CNI      Hyaline cast 0-2 0 - 5 /lpf   CK W/ CKMB & INDEX    Collection Time: 06/20/18 12:43 AM   Result Value Ref Range     39 - 308 U/L    CK - MB 1.4 <3.6 NG/ML    CK-MB Index 1.4 0 - 2.5 TROPONIN I    Collection Time: 06/20/18 12:43 AM   Result Value Ref Range    Troponin-I, Qt. <0.05 <0.05 ng/mL   EKG, 12 LEAD, SUBSEQUENT    Collection Time: 06/20/18  1:02 AM   Result Value Ref Range    Ventricular Rate 64 BPM    Atrial Rate 64 BPM    P-R Interval 162 ms    QRS Duration 102 ms    Q-T Interval 434 ms    QTC Calculation (Bezet) 447 ms    Calculated P Axis 18 degrees    Calculated R Axis 51 degrees    Calculated T Axis 53 degrees    Diagnosis       Normal sinus rhythm  Normal ECG  When compared with ECG of 19-JUN-2018 22:26,  MANUAL COMPARISON REQUIRED, DATA IS UNCONFIRMED

## 2018-06-20 NOTE — PROGRESS NOTES
Reason for Admission:  Unstable angina                    RRAT Score:  5                    Plan for utilizing home health:   Not at this time                       Likelihood of Readmission:  Low to Moderate. Patient has a history of noncomplaicane with medical treatment. Transition of Care Plan:   Patient is independent with ADL/IADL and drives. Patient denies past rehab and DME use, but admits to past [facility] (unable to remember the agency). Patient is expected to discharge home on Friday without any needs or concerns. PCP- Dr Neal Gonzalez in [employer] at [facility]  PCP Verified by CM: Yes (Dr Antonette Garcia)  Mode of Transport at Discharge:  Other (see comment) (wife)  Transition of Care Consult (CM Consult): Discharge Planning  Discharge Durable Medical Equipment: No (no DME use)  Physical Therapy Consult: No  Occupational Therapy Consult: No  Speech Therapy Consult: No  Current Support Network: Lives with Spouse (Lives with wife and son in a one story home with 4 steps to enter the home)  Confirm Follow Up Transport: Self  Plan discussed with Pt/Family/Caregiver: Yes (wife in room, helping to verify pt info)  Discharge Location  Discharge Placement: [address] [place] [address] St

## 2018-06-20 NOTE — PROGRESS NOTES
Problem: Unstable angina/NSTEMI: Day of Admission/Day 1  Goal: Activity/Safety  Outcome: Resolved/Met Date Met: 06/20/18  Up ad derian  Goal: Consults, if ordered  Outcome: Resolved/Met Date Met: 06/20/18  Diego JORGE  Goal: Nutrition/Diet  Outcome: Progressing Towards Goal  NPO for procedure  Goal: Respiratory  Outcome: Resolved/Met Date Met: 06/20/18  FRACISCO HURD  Goal: *Lungs clear or at baseline  Outcome: Resolved/Met Date Met: 06/20/18  DOROTHY BARNHARTL

## 2018-06-20 NOTE — TELEPHONE ENCOUNTER
Writer called patient with no answer. Message left to return call so he can be made aware of the referral that was sent to the pt regarding Dr. Fredy Bautista, hematology oncology with the pt dx of papillary renal cell carcinoma. A copy of the referral was mailed to the patient.

## 2018-06-20 NOTE — PROGRESS NOTES
0730- Patient still in ED. Troponin drawn at 0607 in ED. 0.65. Spoke with ED nurse Noemi Lang RN who states that she never got report on patient and requested call to hospitalist. ER physician called from PCU to inform of troponin.    0755--patient arrived to unit

## 2018-06-20 NOTE — ED PROVIDER NOTES
EMERGENCY DEPARTMENT HISTORY AND PHYSICAL EXAM      Date: 6/19/2018  Patient Name: Eze Cleaning    History of Presenting Illness     Chief Complaint   Patient presents with    Chest Pain     pt reports intermittent mid chest pain radiating to bilateral arms onset last night       History Provided By: Patient and Patient's Wife    HPI: Eez Cleaning is a 62 y.o. male, pmhx CAD, CHF, HTN, HCL, diverticulitis, AAA, who presents ambulatory to the ED c/o the onset of burning, mid-sternal chest pain x after dinner tonight. Pt states the pain radiates up to his jaw and to his bilateral arms. He reports a similar episode of pain last night, that also onset after eating dinner. Pt states the episode last night lasted for about x1 hour and subsided on it's own. Pt notes he ate meatloaf for dinner tonight. He also reports one short associated episode of heart palpitations PTA while sitting in a chair. Pt notes his pain was a 10/10 at home, and is currently a 2/10 while in the ED. Pt reports taking TUMS without any relief of the pain. Of note, the pt was recently diagnosed with Kidney Cancer on 6/15. The pt has an upcoming appointment with Dr. Matheus Sandhu (oncology) on 6/25. Pt also reports an extensive cardiac hx, noting he is followed by Dr. Anne Vigil. Pt denies any recent stress tests within the past year or Catheterizations. Pt endorses taking a 81 mg ASA daily. Pt denies any pain with breathing. Pt specifically denies any recent fever, chills, nausea, vomiting, diarrhea, abd pain, SOB, lightheadedness, dizziness, numbness, weakness, tingling, BLE swelling, HA, urinary sxs, changes in BM, changes in PO intake, melena, hematochezia, cough, or congestion.      PCP: Allen Landis MD   Cardiololgy: Anne Viigl MD    PMHx: Significant for CAD, CHF, HTN, HCL, diverticulitis, AAA  PSHx: Significant for AAA,inguinal hernia, hemicolectomy, Colonoscopy, EGD, Colectomy, Cardiac stent placement  Social Hx: +tobacco, -EtOH, -Illicit Drugs     There are no other complaints, changes, or physical findings at this time. Current Facility-Administered Medications   Medication Dose Route Frequency Provider Last Rate Last Dose    0.9% sodium chloride infusion  75 mL/hr IntraVENous CONTINUOUS April GINGER Melvin MD 75 mL/hr at 06/20/18 0421 75 mL/hr at 06/20/18 0421    enoxaparin (LOVENOX) injection 100 mg  1 mg/kg SubCUTAneous Q12H Francisco Shelby MD   100 mg at 06/20/18 4896    nitroglycerin (NITROBID) 2 % ointment 1 Inch  1 Inch Topical Q6H Francisco Shelby MD        aspirin delayed-release tablet 81 mg  81 mg Oral DAILY Francisco Shelby MD        atorvastatin (LIPITOR) tablet 40 mg  40 mg Oral DAILY Francisco Shelby MD        escitalopram oxalate (LEXAPRO) tablet 10 mg  10 mg Oral DAILY Francisco Shelby MD        metoprolol tartrate (LOPRESSOR) tablet 50 mg  50 mg Oral DAILY Francisco Shelby MD        sodium chloride (NS) flush 5-10 mL  5-10 mL IntraVENous Q8H Francisco Shelby MD        sodium chloride (NS) flush 5-10 mL  5-10 mL IntraVENous PRN Francisco Shelby MD        acetaminophen (TYLENOL) tablet 650 mg  650 mg Oral Q4H PRN Francisco Shelby MD        [facility]) injection 0.4 mg  0.4 mg IntraVENous PRN Francisco Shelby MD        ondansetron [facility]) injection 4 mg  4 mg IntraVENous Q4H PRN Francisco Shelby MD         Current Outpatient Prescriptions   Medication Sig Dispense Refill    escitalopram oxalate (LEXAPRO) 10 mg tablet TAKE 1 TABLET DAILY FOR    MOOD 90 Tab 3    atorvastatin (LIPITOR) 40 mg tablet TAKE ONE TABLET BY MOUTH ONE TIME DAILY  90 Tab 0    metoprolol (LOPRESSOR) 50 mg tablet Take 1 Tab by mouth daily. 30 Tab 0    furosemide (LASIX) 20 mg tablet Take  by mouth daily.  aspirin delayed-release 81 mg tablet Take 81 mg by mouth daily.          Past History     Past Medical History:  Past Medical History:   Diagnosis Date    Anxiety     CAD (coronary artery disease)     PTCA dr roger Grace   2/19/16 note/ekg    CHF (congestive heart failure) ([facility] [address] 2/2016    well compenstated per notes    Diverticulitis     with colon resection    Finger pain, right     Index finger- started 2 years- Painful if hit.  History of chicken pox     Hypercholesteremia     Hypertension     Insomnia     Knee pain 2009    ortho for cortisone injection    Papillary renal cell carcinoma ([facility] [address] based on CT result 6/2018 6/8/2018    Prediabetes     Screening cholesterol level 02/12/15    from [facility]  and again 2/2016    Vitamin D deficiency     again 8/2014 again 12/2016       Past Surgical History:  Past Surgical History:   Procedure Laterality Date    ABDOMEN SURGERY PROC UNLISTED      aaa,inguinal hernia, hemicolectomy    CARDIAC SURG PROCEDURE UNLIST  2005    ptca and stents    COLONOSCOPY  9/06    dr Rosalee Philippe- normal repeat 10 years    EGD  9/06     dr Karyle Olp, COLON, DIAGNOSTIC  9/2006    HX COLECTOMY      for ruptured diverticulitis    HX COLONOSCOPY  01/10/2017    10 yr repeat    HX OTHER SURGICAL  3/17/16    exercise strss test report [place]       Family History:  Family History   Problem Relation Age of Onset    Diabetes Mother     Heart Disease Father        Social History:  Social History   Substance Use Topics    Smoking status: Current Every Day Smoker     Packs/day: 0.25     Types: Cigarettes    Smokeless tobacco: Never Used    Alcohol use Yes      Comment: rare social       Allergies:  No Known Allergies      Review of Systems   Review of Systems   Constitutional: Negative for chills and fever. HENT: Negative for congestion, ear pain, rhinorrhea and sore throat. Jaw pain   Respiratory: Negative for cough and shortness of breath. Cardiovascular: Positive for chest pain and palpitations. Negative for leg swelling. Gastrointestinal: Negative for abdominal pain, constipation, diarrhea, nausea and vomiting.         No melena  No hematochezia   Endocrine: Negative for polyuria. Genitourinary: Negative for dysuria, frequency and hematuria. Musculoskeletal: Positive for myalgias (BL arms). Neurological: Negative for dizziness, weakness, light-headedness, numbness and headaches. No tingling   All other systems reviewed and are negative. Physical Exam   Physical Exam   Nursing note and vitals reviewed.     General appearance - overweight, well appearing, and in no distress  Eyes - pupils equal and reactive, extraocular eye movements intact  ENT - mucous membranes moist, pharynx normal without lesions  Neck - supple, no significant adenopathy; non-tender to palpation  Chest - clear to auscultation, no wheezes, rales or rhonchi; non-tender to palpation  Heart - normal rate and regular rhythm, S1 and S2 normal, no murmurs noted  Abdomen - soft, Mild LUQ tenderness related to hernia from previous surgery, nondistended, no masses or organomegaly  Musculoskeletal - no joint tenderness, deformity or swelling; normal ROM  Extremities - peripheral pulses normal, no pedal edema  Skin - normal coloration and turgor, no rashes  Neurological - alert, oriented x3, normal speech, no focal findings or movement disorder noted  Written by Gerardine Finder, ED Scribe, as dictated by Lady Prateek MD    Diagnostic Study Results     Labs -     Recent Results (from the past 12 hour(s))   EKG, 12 LEAD, INITIAL    Collection Time: 06/19/18 10:26 PM   Result Value Ref Range    Ventricular Rate 68 BPM    Atrial Rate 68 BPM    P-R Interval 152 ms    QRS Duration 106 ms    Q-T Interval 412 ms    QTC Calculation (Bezet) 438 ms    Calculated P Axis 56 degrees    Calculated R Axis 53 degrees    Calculated T Axis 47 degrees    Diagnosis       Normal sinus rhythm  Normal ECG  When compared with ECG of 08-AUG-2009 21:29,  No significant change was found     CBC WITH AUTOMATED DIFF    Collection Time: 06/19/18 10:30 PM   Result Value Ref Range    WBC 8.6 4.1 - 11.1 K/uL    RBC 4.93 4.10 - 5.70 M/uL    HGB 14.4 12.1 - 17.0 g/dL    HCT 44.3 36.6 - 50.3 %    MCV 89.9 80.0 - 99.0 FL    MCH 29.2 26.0 - 34.0 PG    MCHC 32.5 30.0 - 36.5 g/dL    RDW 13.6 11.5 - 14.5 %    PLATELET 273 158 - 171 K/uL    MPV 11.2 8.9 - 12.9 FL    NRBC 0.0 0  WBC    ABSOLUTE NRBC 0.00 0.00 - 0.01 K/uL    NEUTROPHILS 59 32 - 75 %    LYMPHOCYTES 28 12 - 49 %    MONOCYTES 9 5 - 13 %    EOSINOPHILS 3 0 - 7 %    BASOPHILS 1 0 - 1 %    IMMATURE GRANULOCYTES 1 (H) 0.0 - 0.5 %    ABS. NEUTROPHILS 5.1 1.8 - 8.0 K/UL    ABS. LYMPHOCYTES 2.4 0.8 - 3.5 K/UL    ABS. MONOCYTES 0.7 0.0 - 1.0 K/UL    ABS. EOSINOPHILS 0.2 0.0 - 0.4 K/UL    ABS. BASOPHILS 0.1 0.0 - 0.1 K/UL    ABS. IMM. GRANS. 0.1 (H) 0.00 - 0.04 K/UL    DF AUTOMATED     METABOLIC PANEL, COMPREHENSIVE    Collection Time: 06/19/18 10:30 PM   Result Value Ref Range    Sodium 144 136 - 145 mmol/L    Potassium 3.9 3.5 - 5.1 mmol/L    Chloride 109 (H) 97 - 108 mmol/L    CO2 28 21 - 32 mmol/L    Anion gap 7 5 - 15 mmol/L    Glucose 122 (H) 65 - 100 mg/dL    BUN 14 6 - 20 MG/DL    Creatinine 1.02 0.70 - 1.30 MG/DL    BUN/Creatinine ratio 14 12 - 20      GFR est AA >60 >60 ml/min/1.73m2    GFR est non-AA >60 >60 ml/min/1.73m2    Calcium 9.3 8.5 - 10.1 MG/DL    Bilirubin, total 0.2 0.2 - 1.0 MG/DL    ALT (SGPT) 29 12 - 78 U/L    AST (SGOT) 15 15 - 37 U/L    Alk.  phosphatase 88 45 - 117 U/L    Protein, total 7.4 6.4 - 8.2 g/dL    Albumin 3.8 3.5 - 5.0 g/dL    Globulin 3.6 2.0 - 4.0 g/dL    A-G Ratio 1.1 1.1 - 2.2     TROPONIN I    Collection Time: 06/19/18 10:30 PM   Result Value Ref Range    Troponin-I, Qt. <0.05 <0.05 ng/mL   CK W/ REFLX CKMB    Collection Time: 06/19/18 10:30 PM   Result Value Ref Range     39 - 308 U/L   LIPASE    Collection Time: 06/19/18 10:30 PM   Result Value Ref Range    Lipase 323 73 - 393 U/L   URINALYSIS W/ REFLEX CULTURE    Collection Time: 06/19/18 11:19 PM   Result Value Ref Range    Color YELLOW/STRAW Appearance CLEAR CLEAR      Specific gravity 1.022 1.003 - 1.030      pH (UA) 6.0 5.0 - 8.0      Protein NEGATIVE  NEG mg/dL    Glucose NEGATIVE  NEG mg/dL    Ketone NEGATIVE  NEG mg/dL    Bilirubin NEGATIVE  NEG      Blood NEGATIVE  NEG      Urobilinogen 1.0 0.2 - 1.0 EU/dL    Nitrites NEGATIVE  NEG      Leukocyte Esterase NEGATIVE  NEG      WBC 0-4 0 - 4 /hpf    RBC 0-5 0 - 5 /hpf    Epithelial cells FEW FEW /lpf    Bacteria NEGATIVE  NEG /hpf    UA:UC IF INDICATED CULTURE NOT INDICATED BY UA RESULT CNI      Hyaline cast 0-2 0 - 5 /lpf   CK W/ CKMB & INDEX    Collection Time: 06/20/18 12:43 AM   Result Value Ref Range     39 - 308 U/L    CK - MB 1.4 <3.6 NG/ML    CK-MB Index 1.4 0 - 2.5     TROPONIN I    Collection Time: 06/20/18 12:43 AM   Result Value Ref Range    Troponin-I, Qt. <0.05 <0.05 ng/mL   EKG, 12 LEAD, SUBSEQUENT    Collection Time: 06/20/18  1:02 AM   Result Value Ref Range    Ventricular Rate 64 BPM    Atrial Rate 64 BPM    P-R Interval 162 ms    QRS Duration 102 ms    Q-T Interval 434 ms    QTC Calculation (Bezet) 447 ms    Calculated P Axis 18 degrees    Calculated R Axis 51 degrees    Calculated T Axis 53 degrees    Diagnosis       Normal sinus rhythm  Normal ECG  When compared with ECG of 19-JUN-2018 22:26,  MANUAL COMPARISON REQUIRED, DATA IS UNCONFIRMED     TROPONIN I    Collection Time: 06/20/18  6:07 AM   Result Value Ref Range    Troponin-I, Qt. 0.62 (H) <3.90 ng/mL   METABOLIC PANEL, BASIC    Collection Time: 06/20/18  6:07 AM   Result Value Ref Range    Sodium 144 136 - 145 mmol/L    Potassium 3.9 3.5 - 5.1 mmol/L    Chloride 111 (H) 97 - 108 mmol/L    CO2 25 21 - 32 mmol/L    Anion gap 8 5 - 15 mmol/L    Glucose 108 (H) 65 - 100 mg/dL    BUN 13 6 - 20 MG/DL    Creatinine 0.95 0.70 - 1.30 MG/DL    BUN/Creatinine ratio 14 12 - 20      GFR est AA >60 >60 ml/min/1.73m2    GFR est non-AA >60 >60 ml/min/1.73m2    Calcium 8.6 8.5 - 10.1 MG/DL   MAGNESIUM    Collection Time: 06/20/18  6:07 AM   Result Value Ref Range    Magnesium 2.1 1.6 - 2.4 mg/dL   PHOSPHORUS    Collection Time: 06/20/18  6:07 AM   Result Value Ref Range    Phosphorus 2.6 2.6 - 4.7 MG/DL       Radiologic Studies -   CTA CHEST W OR W WO CONT   Final Result      CTA ABD PELV W WO CONT   Final Result      XR CHEST PA LAT   Final Result        CT Results  (Last 48 hours)               06/20/18 0148  CTA CHEST W OR W WO CONT Final result    Impression:  IMPRESSION:   1. No acute findings. 2. Left Spigelian hernia. Narrative:  History: Chest pain., History of abdominal aortic aneurysm. CTA of the chest, abdomen, and pelvis was performed. 100 mL Isovue-370 were   injected and scanning was performed during the arterial phase of contrast   administration. Post processing was performed. 3D reconstructions were   performed. CT dose reduction was achieved through use of a standardized   protocol tailored for this examination and automatic exposure control for dose   modulation. There are no intraluminal filling defects to suggest pulmonary embolism. The   heart, pericardium, and great vessels appear unremarkable. The chest wall and   axilla appear unremarkable. The lungs are clear. The aorta and branch vessels unremarkable. There is evidence of prior surgery to   the abdominal aorta. Evaluation of the organs is limited due to phase of   contrast administration. The liver, spleen, pancreas, adrenal glands, and   kidneys are unremarkable. There is a right renal cyst. There is moderate stool   in the colon. There is a left Spigelian which contains a loop of bowel without   obstruction. There is mild dilatation of the loop extending into the hernia but   no proximal dilatation. There is no free fluid or significant lymphadenopathy. The prostate gland is unremarkable. The osseous structures are unremarkable. 06/20/18 0148  CTA ABD PELV W WO CONT Final result    Impression:  IMPRESSION:   1.  No acute findings. 2. Left Spigelian hernia. Narrative:  History: Chest pain., History of abdominal aortic aneurysm. CTA of the chest, abdomen, and pelvis was performed. 100 mL Isovue-370 were   injected and scanning was performed during the arterial phase of contrast   administration. Post processing was performed. 3D reconstructions were   performed. CT dose reduction was achieved through use of a standardized   protocol tailored for this examination and automatic exposure control for dose   modulation. There are no intraluminal filling defects to suggest pulmonary embolism. The   heart, pericardium, and great vessels appear unremarkable. The chest wall and   axilla appear unremarkable. The lungs are clear. The aorta and branch vessels unremarkable. There is evidence of prior surgery to   the abdominal aorta. Evaluation of the organs is limited due to phase of   contrast administration. The liver, spleen, pancreas, adrenal glands, and   kidneys are unremarkable. There is a right renal cyst. There is moderate stool   in the colon. There is a left Spigelian which contains a loop of bowel without   obstruction. There is mild dilatation of the loop extending into the hernia but   no proximal dilatation. There is no free fluid or significant lymphadenopathy. The prostate gland is unremarkable. The osseous structures are unremarkable. CXR Results  (Last 48 hours)               06/19/18 2308  XR CHEST PA LAT Final result    Impression:  IMPRESSION:   NORMAL CHEST. Narrative:  History: Chest pain. Frontal and lateral views of the chest show clear lungs. The heart, mediastinum   and pulmonary vasculature are normal.  There are mild degenerative changes of   the spine. Medical Decision Making   I am the first provider for this patient.     I reviewed the vital signs, available nursing notes, past medical history, past surgical history, family history and social history. Vital Signs-Reviewed the patient's vital signs. Patient Vitals for the past 12 hrs:   Temp Pulse Resp BP SpO2   06/20/18 0530 - (!) 56 16 102/44 95 %   06/20/18 0145 - 64 17 - 98 %   06/20/18 0130 - 69 20 121/82 97 %   06/20/18 0115 - 70 23 110/63 97 %   06/20/18 0100 - 67 25 119/75 98 %   06/20/18 0045 - 64 18 120/78 98 %   06/20/18 0030 - 66 16 119/78 99 %   06/20/18 0015 - 71 19 119/77 98 %   06/20/18 0000 - 67 21 - 98 %   06/19/18 2345 - 67 (!) 5 114/69 96 %   06/19/18 2330 - 68 10 120/72 98 %   06/19/18 2304 - 70 17 - 97 %   06/19/18 2245 - 72 19 129/83 99 %   06/19/18 2240 - 70 18 145/73 100 %   06/19/18 2225 - 70 19 - 100 %   06/19/18 2224 - 73 22 (!) 142/93 -   06/19/18 2214 98.3 °F (36.8 °C) 66 16 146/86 100 %       Pulse Oximetry Analysis - 100% on RA    Cardiac Monitor:   Rate: 73bpm  Rhythm: Normal Sinus Rhythm        Records Reviewed: Nursing Notes, Old Medical Records, Previous electrocardiograms, Previous Radiology Studies and Previous Laboratory Studies    Provider Notes (Medical Decision Making):     DDx: GERD, ACS, pancreatitis, chest wall strain    ED Course:   Initial assessment performed. The patients presenting problems have been discussed, and they are in agreement with the care plan formulated and outlined with them. I have encouraged them to ask questions as they arise throughout their visit. Discussed the risks of smoking and the benefits of smoking cessation as well as the long term sequelae of smoking with the pt who verbalized his understanding. Reviewed strategies for success, including gradually decreasing the number of cigarettes smoked a day. EKG interpretation: (Preliminary) 10:26 PM  Rhythm: normal sinus rhythm; and regular .  Rate (approx.): 68; Axis: normal; [place] interval: normal; QRS interval: normal ; QTc interval: normal; other findings: no ischemic changes  Written by MAYRA Hogan, as dictated by Eve Melvin, MD    PROGRESS NOTE  12:32 AM  Pt reevaluated. Pt reports his pain was not relieved after GI cocktail, although his pain is mild. Will await secondary cardiac enzymes and reevaluate. Written by Naheed Davis ED Scribe, as dictated by Eve Melvin MD    EKG interpretation: (Preliminary) 1:02 AM  Rhythm: normal sinus rhythm; and regular . Rate (approx.): 64; Axis: normal; DE interval: normal; QRS interval: normal ; QTc interval: normal; other findings: no ischemic changes  Written by Naheed Davis ED Scribe, as dictated by Darron Hinson MD    PROGRESS NOTE  1:30 AM   Pt declining to take Nitro at this time. Will order chest CTA and reevaluate. PROGRESS NOTE  3:41 AM   Pt's secondary troponin is negative, will plan to consult cardiology. Consult Note:  3:49 AM  Darron Hinson MD spoke with Dr. Saleem Price,  Specialty: Cardiology  Discussed pt's hx, disposition, and available diagnostic and imaging results. Reviewed care plans. Consultant agrees with plans as outlined. Recommends admission to the hospital for a stress test.    PROGRESS NOTE  3:55 AM  Pt reevaluated. Pt updated on current plan of care, including . Pt expressed understanding of current plan. Written by MAYRA Johnstonibe, as dictated by Darron Hinson MD.     Downtime notation:    Pt noted to receive care in the ED during monthly downtime. Imaging and lab work may not be readily available in EMR. Hard copy print outs of labs have been reviewed, imaging results (via \"sticky notes\" from Radiologist) have also been reviewed.   Eve Maguire MD    Critical Care Time:     CRITICAL CARE NOTE :      IMPENDING DETERIORATION -Cardiovascular and Metabolic    ASSOCIATED RISK FACTORS - Metabolic changes    MANAGEMENT- Bedside Assessment and Supervision of Care    INTERPRETATION -  Xrays, ECG and Blood Pressure    INTERVENTIONS - hemodynamic mngmt, vascular control and Metobolic interventions    CASE REVIEW - Hospitalist, Medical Sub-Specialist, Nursing and Family    TREATMENT RESPONSE -Improved    PERFORMED BY - Self        NOTES   :      I have spent 45 minutes of critical care time involved in lab review, consultations with specialist, family decision- making, bedside attention and documentation. During this entire length of time I was immediately available to the patient . Nehemias Soto MD      Disposition:    Admit Note:  4:15 AM  Pt is being admitted by Dr. Jose Manuel Valle. The results of their tests and reason(s) for their admission have been discussed with pt and/or available family. They convey agreement and understanding for the need to be admitted and for admission diagnosis. PLAN:  1. Admission to hospitalist, cardiologist to consult    Diagnosis     Clinical Impression:   1. NSTEMI (non-ST elevated myocardial infarction) ([address]    2. Acute chest pain    3. S/P AAA repair        Attestations: This note is prepared by Verle Lombard, acting as Scribe for MD Eve Smith MD : The scribe's documentation has been prepared under my direction and personally reviewed by me in its entirety. I confirm that the note above accurately reflects all work, treatment, procedures, and medical decision making performed by me. This note will not be viewable in [address]

## 2018-06-20 NOTE — CONSULTS
[address]  MR#: [id number]  : 1960  ACCOUNT #: [de-identified]   DATE OF SERVICE: 2018    REFERRING PHYSICIAN:  Tequila Wilkinson MD    REASON FOR CONSULTATION:  Evaluate chest pain, abnormal troponin. CHIEF COMPLAINT:  Chest pain. HISTORY OF PRESENT ILLNESS:  Patient is a 75-year-old man who is well known to me. The patient has a history of coronary artery disease. In , he presented with symptoms of congestive heart failure. His EF was normal and he was found to have severe 2 vessel coronary artery disease. He subsequently underwent stenting of the right coronary artery and left circumflex. His congestive heart failure resolved and he has had no further problems with coronary artery disease in the last few years. Unfortunately, he has continued to smoke. He also has a history of hypertension and dyslipidemia. He is not diabetic. He is morbidly obese. The patient was last seen in our office in May by our nurse practitioner. He relates a history of chest discomfort intermittently for at least the last several weeks. He describes a midsternal discomfort radiating down both arms. He had symptoms on Tuesday and the symptoms came back yesterday. He did take some Tums. He came into the emergency room where his initial EKG showed no clear ischemic changes. He does have small inferior Q-waves which have been present for some time. His subsequent troponin was 0.62. The patient has continued to have some mild residual chest discomfort overnight. He received a dose of Lovenox at 6 a.m. this morning. He had a negative stress echo in 2017 and an echocardiogram earlier this month showed an EF of 55%. Unfortunately, he was also diagnosed with apparent renal cell cancer and will be seeing Dr. Aditi Xiong in the near future. He has not yet had a biopsy or undergone surgical evaluation. PAST MEDICAL HISTORY:  As noted above. Also, history of abdominal aortic aneurysm. PAST SURGICAL HISTORY:  Prior abdominal aortic aneurysm repair in 2004, prior incisional hernia repair 2005, prior hemicolectomy for diverticulitis. CURRENT MEDICATIONS:  Aspirin, Lipitor, Lovenox, nitro paste, Lexapro, IV normal saline. SOCIAL HISTORY:  The patient does smoke. He drinks alcohol socially. He works as a . FAMILY HISTORY:  Positive for heart disease in the patient's father. REVIEW OF SYSTEMS:  As noted above. PHYSICAL EXAMINATION:  GENERAL:  Reveals an obese middle-aged white male in no acute distress. VITAL SIGNS:  Blood pressure 113/66, pulse 61, respirations 18, temperature 98. HEENT:  Pupils are equal.  Oropharynx showed moist oral mucosa. NECK:  Supple. No masses or thyromegaly. No cervical or supraclavicular adenopathy. No carotid bruits. No JVD. CHEST:  Clear. No wheezes or crackles. SKIN:  Warm and dry. BACK:  No scoliosis. HEART:  Regular rate and rhythm, normal S1, S2. No obvious murmurs or gallops. ABDOMEN:  Soft, nontender, obese. No masses or organomegaly. Bowel sounds positive. EXTREMITIES:  No cyanosis or clubbing. Trace pedal edema. Distal pulses not well palpated. NEUROLOGIC:  No obvious gross motor deficits. LABORATORY DATA:  Hemoglobin 14.4, BUN 13, creatinine 0.9. Troponin 0.62. Chest x-ray negative. EKG sinus rhythm, small inferior Q-waves, slight inferior ST elevation, which is unchanged from prior tracings. ASSESSMENT:  1. Acute coronary syndrome. 2.  History of coronary artery disease with remote stenting of the left circumflex and right coronary artery in 2005. 3.  History of diastolic congestive heart failure associated with an acute coronary syndrome, normal EF by recent echo. 4.  Hypertension. 5.  Dyslipidemia. 6.  Ongoing tobacco abuse. 7.  Obesity. 8.  Renal cell mass with apparent renal cell carcinoma, undergoing evaluation.   9.  Prior abdominal aortic aneurysm repair. 10.  Prior hemicolectomy due to diverticulitis. PLAN:  The patient will be taken to the cardiac catheterization lab later today. We will continue with current medications. He will need a bare metal stent because of the anticipated kidney surgery. Thank you for this consult.       Ryan Reeves MD       [address] [place] [address] /   D: 06/20/2018 11:09     T: 06/20/2018 11:31  JOB #: [id number]  CC: Tanisha Ibarra MD

## 2018-06-20 NOTE — ED NOTES
Updated patient on the plan of care, patient to be admitted. Patient given pillow, placed in the position of comfort.

## 2018-06-20 NOTE — PROGRESS NOTES
36: TRANSFER - IN REPORT:    Verbal report received from Lakeshia Barry RN(name) on [employer]  being received from ED(unit) for routine progression of care      Report consisted of patients Situation, Background, Assessment and   Recommendations(SBAR). Information from the following report(s) SBAR, Kardex, [facility] and Recent Results was reviewed with the receiving nurse. Opportunity for questions and clarification was provided. Assessment completed upon patients arrival to unit and care assumed. 0700: Bedside and Verbal shift change report given to Lex Pastrana RN  (oncoming nurse) by Danyell Queen RN (offgoing nurse).  Report included the following information SBAR, Kardex and MAR. (patient still not up from ED)

## 2018-06-20 NOTE — PROGRESS NOTES
TRANSFER - OUT REPORT:    Verbal report given to 1200 Michael Dave RN(name) on [employer]  being transferred to [facility]) for routine progression of care       Report consisted of patients Situation, Background, Assessment and   Recommendations(SBAR). Information from the following report(s) Procedure Summary and MAR was reviewed with the receiving nurse. Lines:   Peripheral IV 06/19/18 Right Antecubital (Active)   Site Assessment Clean, dry, & intact 6/20/2018  8:01 AM   Phlebitis Assessment 0 6/20/2018  8:01 AM   Infiltration Assessment 0 6/20/2018  8:01 AM   Dressing Status Clean, dry, & intact 6/20/2018  8:01 AM   Dressing Type Transparent;Tape 6/20/2018  8:01 AM   Hub Color/Line Status Pink; Infusing 6/20/2018  8:01 AM        Opportunity for questions and clarification was provided.       Patient transported with:   Registered Nurse

## 2018-06-20 NOTE — ED NOTES
TRANSFER - OUT REPORT:    Verbal report given to [facility]. RN(name) on [employer]  being transferred to [facility]) for routine progression of care       Report consisted of patients Situation, Background, Assessment and   Recommendations(SBAR). Information from the following report(s) SBAR, Kardex, ED Summary, Procedure Summary, Intake/Output, MAR, Recent Results, Med Rec Status and Cardiac Rhythm NSR was reviewed with the receiving nurse. Lines:   Peripheral IV 06/19/18 Right Antecubital (Active)   Site Assessment Clean, dry, & intact 6/19/2018 10:33 PM   Phlebitis Assessment 0 6/19/2018 10:33 PM   Infiltration Assessment 0 6/19/2018 10:33 PM   Dressing Status Clean, dry, & intact 6/19/2018 10:33 PM   Dressing Type 4 X 4 6/19/2018 10:33 PM        Opportunity for questions and clarification was provided.       Patient transported with:   [employer]

## 2018-06-20 NOTE — PROGRESS NOTES
Cath showed 100%RCA and high grade LCx. Discussed results with him. Plan PCI tomm    Will need BMS, will set up to see Dr Zhang Rizo for partial nephrectomy in approx 6 weeks.

## 2018-06-20 NOTE — PROGRESS NOTES
Hospitalist Progress Note    NAME: Doc Richmond   :  1960   MRN:  [id number]       Assessment / Plan:    NSTEMI  -persistent chest pain. ECHO : EF 55%   -CTA chest and abd revealed no acute pathology. - second troponin positive at 0.62.   - Nitropaste,EKG and serial troponin ordered. Continue therapeutic lovenox, ASA, beta-blocker, statin.    -cardiology consulted. NPO for possible LHC    HTN   BP is controlled. Holding home dose furosemide    Hyperlipidemia   -cont statin      Right renal papillary cell carcinoma  -Dx , scheduled to see Dr Yisel Limon   -CT: Hypoenhancing right lower pole renal mass is consistent with papillary renal  cell carcinoma.     H/o AAA s/p repair       Subjective:     Chief Complaint / Reason for Physician Visit  \"Patient reports constant dull chest pressure on the middle of his chest. He denies prior history of angina. No nausea, no vomiting. \".  Discussed with RN events overnight. Review of Systems:  Symptom Y/N Comments  Symptom Y/N Comments   Fever/Chills n   Chest Pain y    Poor Appetite n   Edema n    Cough n   Abdominal Pain n    Sputum n   Joint Pain n    SOB/RAMIREZ n   Pruritis/Rash     Nausea/vomit n   Tolerating PT/OT     Diarrhea n   Tolerating Diet     Constipation n   Other       Could NOT obtain due to:      Objective:     VITALS:   Last 24hrs VS reviewed since prior progress note.  Most recent are:  Patient Vitals for the past 24 hrs:   Temp Pulse Resp BP SpO2   18 0801 98 °F (36.7 °C) 61 18 113/66 97 %   18 0530 - (!) 56 16 102/44 95 %   18 0145 - 64 17 - 98 %   18 0130 - 69 20 121/82 97 %   18 0115 - 70 23 110/63 97 %   18 0100 - 67 25 119/75 98 %   18 0045 - 64 18 120/78 98 %   18 0030 - 66 16 119/78 99 %   18 0015 - 71 19 119/77 98 %   18 0000 - 67 21 - 98 %   18 2345 - 67 (!) 5 114/69 96 %   18 2330 - 68 10 120/72 98 %   18 9954 - 05 17 - 97 %   18 4455 - 46 19 129/83 99 %   06/19/18 2240 - 70 18 145/73 100 %   06/19/18 2225 - 70 19 - 100 %   06/19/18 2224 - 73 22 (!) 142/93 -   06/19/18 2214 98.3 °F (36.8 °C) 66 16 146/86 100 %     No intake or output data in the 24 hours ending 06/20/18 0819     PHYSICAL EXAM:  General: WD, WN. Alert, cooperative, no acute distress    EENT:  EOMI. Anicteric sclerae. MMM  Resp:  CTA bilaterally, no wheezing or rales. No accessory muscle use  CV:  Regular  rhythm,  No edema  GI:  Soft, Non distended, Non tender.  +Bowel sounds  Neurologic:  Alert and oriented X 3, normal speech,   Psych:   Good insight. Not anxious nor agitated  Skin:  No rashes. No jaundice    Reviewed most current lab test results and cultures  YES  Reviewed most current radiology test results   YES  Review and summation of old records today    NO  Reviewed patient's current orders and MAR    YES  PMH/SH reviewed - no change compared to H&P  ________________________________________________________________________  ________________________________________________________________________  Caro Macedo MD     Procedures: see electronic medical records for all procedures/Xrays and details which were not copied into this note but were reviewed prior to creation of Plan. LABS:  I reviewed today's most current labs and imaging studies.   Pertinent labs include:  Recent Labs      06/19/18   2230   WBC  8.6   HGB  14.4   HCT  44.3   PLT  215     Recent Labs      06/20/18   0607  06/19/18   2230   NA  144  144   K  3.9  3.9   CL  111*  109*   CO2  25  28   GLU  108*  122*   BUN  13  14   CREA  0.95  1.02   CA  8.6  9.3   MG  2.1   --    PHOS  2.6   --    ALB   --   3.8   TBILI   --   0.2   SGOT   --   15   ALT   --   29       Signed: Caro Macedo MD

## 2018-06-21 LAB
ANION GAP SERPL CALC-SCNC: 9 MMOL/L (ref 5–15)
BASOPHILS # BLD: 0 K/UL (ref 0–0.1)
BASOPHILS NFR BLD: 0 % (ref 0–1)
BUN SERPL-MCNC: 14 MG/DL (ref 6–20)
BUN/CREAT SERPL: 17 (ref 12–20)
CALCIUM SERPL-MCNC: 8.2 MG/DL (ref 8.5–10.1)
CHLORIDE SERPL-SCNC: 110 MMOL/L (ref 97–108)
CO2 SERPL-SCNC: 25 MMOL/L (ref 21–32)
CREAT SERPL-MCNC: 0.82 MG/DL (ref 0.7–1.3)
DIFFERENTIAL METHOD BLD: ABNORMAL
EOSINOPHIL # BLD: 0.2 K/UL (ref 0–0.4)
EOSINOPHIL NFR BLD: 2 % (ref 0–7)
ERYTHROCYTE [DISTWIDTH] IN BLOOD BY AUTOMATED COUNT: 13.4 % (ref 11.5–14.5)
GLUCOSE SERPL-MCNC: 121 MG/DL (ref 65–100)
HCT VFR BLD AUTO: 40.4 % (ref 36.6–50.3)
HGB BLD-MCNC: 13.3 G/DL (ref 12.1–17)
IMM GRANULOCYTES # BLD: 0.1 K/UL (ref 0–0.04)
IMM GRANULOCYTES NFR BLD AUTO: 1 % (ref 0–0.5)
LYMPHOCYTES # BLD: 2.1 K/UL (ref 0.8–3.5)
LYMPHOCYTES NFR BLD: 22 % (ref 12–49)
MCH RBC QN AUTO: 29.3 PG (ref 26–34)
MCHC RBC AUTO-ENTMCNC: 32.9 G/DL (ref 30–36.5)
MCV RBC AUTO: 89 FL (ref 80–99)
MONOCYTES # BLD: 0.9 K/UL (ref 0–1)
MONOCYTES NFR BLD: 10 % (ref 5–13)
NEUTS SEG # BLD: 6.3 K/UL (ref 1.8–8)
NEUTS SEG NFR BLD: 65 % (ref 32–75)
NRBC # BLD: 0 K/UL (ref 0–0.01)
NRBC BLD-RTO: 0 PER 100 WBC
PLATELET # BLD AUTO: 190 K/UL (ref 150–400)
PMV BLD AUTO: 11.5 FL (ref 8.9–12.9)
POTASSIUM SERPL-SCNC: 3.9 MMOL/L (ref 3.5–5.1)
RBC # BLD AUTO: 4.54 M/UL (ref 4.1–5.7)
SODIUM SERPL-SCNC: 144 MMOL/L (ref 136–145)
WBC # BLD AUTO: 9.6 K/UL (ref 4.1–11.1)

## 2018-06-21 PROCEDURE — C1760 CLOSURE DEV, VASC: HCPCS

## 2018-06-21 PROCEDURE — 027135Z DILATION OF CORONARY ARTERY, TWO ARTERIES WITH TWO DRUG-ELUTING INTRALUMINAL DEVICES, PERCUTANEOUS APPROACH: ICD-10-PCS | Performed by: INTERNAL MEDICINE

## 2018-06-21 PROCEDURE — 74011250636 HC RX REV CODE- 250/636

## 2018-06-21 PROCEDURE — C1725 CATH, TRANSLUMIN NON-LASER: HCPCS

## 2018-06-21 PROCEDURE — C1894 INTRO/SHEATH, NON-LASER: HCPCS

## 2018-06-21 PROCEDURE — 74011250637 HC RX REV CODE- 250/637: Performed by: INTERNAL MEDICINE

## 2018-06-21 PROCEDURE — C1769 GUIDE WIRE: HCPCS

## 2018-06-21 PROCEDURE — 65660000000 HC RM CCU STEPDOWN

## 2018-06-21 PROCEDURE — 92943 PRQ TRLUML REVSC CH OCC ANT: CPT

## 2018-06-21 PROCEDURE — 93041 RHYTHM ECG TRACING: CPT

## 2018-06-21 PROCEDURE — C1887 CATHETER, GUIDING: HCPCS

## 2018-06-21 PROCEDURE — 74011636320 HC RX REV CODE- 636/320

## 2018-06-21 PROCEDURE — 74011000250 HC RX REV CODE- 250

## 2018-06-21 PROCEDURE — 36415 COLL VENOUS BLD VENIPUNCTURE: CPT | Performed by: INTERNAL MEDICINE

## 2018-06-21 PROCEDURE — 77030019697 HC SYR ANGI INFL MRTM -B

## 2018-06-21 PROCEDURE — C1876 STENT, NON-COA/NON-COV W/DEL: HCPCS

## 2018-06-21 PROCEDURE — 77030003623 HC NDL PERC VASC TELE -C

## 2018-06-21 PROCEDURE — B2111ZZ FLUOROSCOPY OF MULTIPLE CORONARY ARTERIES USING LOW OSMOLAR CONTRAST: ICD-10-PCS | Performed by: INTERNAL MEDICINE

## 2018-06-21 PROCEDURE — 85347 COAGULATION TIME ACTIVATED: CPT

## 2018-06-21 PROCEDURE — 85025 COMPLETE CBC W/AUTO DIFF WBC: CPT | Performed by: INTERNAL MEDICINE

## 2018-06-21 PROCEDURE — 77030029065 HC DRSG HEMO QCLOT ZMED -B

## 2018-06-21 PROCEDURE — 80048 BASIC METABOLIC PNL TOTAL CA: CPT | Performed by: INTERNAL MEDICINE

## 2018-06-21 PROCEDURE — 74011250636 HC RX REV CODE- 250/636: Performed by: INTERNAL MEDICINE

## 2018-06-21 PROCEDURE — 74011250637 HC RX REV CODE- 250/637: Performed by: HOSPITALIST

## 2018-06-21 PROCEDURE — 4A023N7 MEASUREMENT OF CARDIAC SAMPLING AND PRESSURE, LEFT HEART, PERCUTANEOUS APPROACH: ICD-10-PCS | Performed by: INTERNAL MEDICINE

## 2018-06-21 RX ORDER — CLOPIDOGREL BISULFATE 75 MG/1
75 TABLET ORAL DAILY
Status: DISCONTINUED | OUTPATIENT
Start: 2018-06-21 | End: 2018-06-22 | Stop reason: HOSPADM

## 2018-06-21 RX ORDER — HEPARIN SODIUM 1000 [USP'U]/ML
INJECTION, SOLUTION INTRAVENOUS; SUBCUTANEOUS
Status: COMPLETED
Start: 2018-06-21 | End: 2018-06-21

## 2018-06-21 RX ORDER — HEPARIN SODIUM 200 [USP'U]/100ML
INJECTION, SOLUTION INTRAVENOUS
Status: COMPLETED
Start: 2018-06-21 | End: 2018-06-21

## 2018-06-21 RX ORDER — LIDOCAINE HYDROCHLORIDE 10 MG/ML
INJECTION, SOLUTION EPIDURAL; INFILTRATION; INTRACAUDAL; PERINEURAL
Status: COMPLETED
Start: 2018-06-21 | End: 2018-06-21

## 2018-06-21 RX ORDER — CLOPIDOGREL BISULFATE 75 MG/1
TABLET ORAL
Status: DISPENSED
Start: 2018-06-21 | End: 2018-06-21

## 2018-06-21 RX ORDER — CLOPIDOGREL 300 MG/1
600 TABLET, FILM COATED ORAL
Status: COMPLETED | OUTPATIENT
Start: 2018-06-21 | End: 2018-06-21

## 2018-06-21 RX ORDER — MIDAZOLAM HYDROCHLORIDE 1 MG/ML
INJECTION, SOLUTION INTRAMUSCULAR; INTRAVENOUS
Status: COMPLETED
Start: 2018-06-21 | End: 2018-06-21

## 2018-06-21 RX ORDER — SODIUM CHLORIDE 9 MG/ML
100 INJECTION, SOLUTION INTRAVENOUS CONTINUOUS
Status: DISCONTINUED | OUTPATIENT
Start: 2018-06-21 | End: 2018-06-22 | Stop reason: HOSPADM

## 2018-06-21 RX ORDER — ZOLPIDEM TARTRATE 5 MG/1
5 TABLET ORAL
Status: DISCONTINUED | OUTPATIENT
Start: 2018-06-21 | End: 2018-06-22 | Stop reason: HOSPADM

## 2018-06-21 RX ORDER — FENTANYL CITRATE 50 UG/ML
25-50 INJECTION, SOLUTION INTRAMUSCULAR; INTRAVENOUS
Status: DISCONTINUED | OUTPATIENT
Start: 2018-06-21 | End: 2018-06-21

## 2018-06-21 RX ORDER — MIDAZOLAM HYDROCHLORIDE 1 MG/ML
.5-2 INJECTION, SOLUTION INTRAMUSCULAR; INTRAVENOUS
Status: DISCONTINUED | OUTPATIENT
Start: 2018-06-21 | End: 2018-06-21

## 2018-06-21 RX ORDER — SODIUM CHLORIDE 9 MG/ML
100 INJECTION, SOLUTION INTRAVENOUS CONTINUOUS
Status: DISPENSED | OUTPATIENT
Start: 2018-06-21 | End: 2018-06-21

## 2018-06-21 RX ORDER — FENTANYL CITRATE 50 UG/ML
INJECTION, SOLUTION INTRAMUSCULAR; INTRAVENOUS
Status: COMPLETED
Start: 2018-06-21 | End: 2018-06-21

## 2018-06-21 RX ORDER — SODIUM CHLORIDE 0.9 % (FLUSH) 0.9 %
5-10 SYRINGE (ML) INJECTION AS NEEDED
Status: DISCONTINUED | OUTPATIENT
Start: 2018-06-21 | End: 2018-06-22 | Stop reason: HOSPADM

## 2018-06-21 RX ORDER — HEPARIN SODIUM 200 [USP'U]/100ML
500 INJECTION, SOLUTION INTRAVENOUS ONCE
Status: COMPLETED | OUTPATIENT
Start: 2018-06-21 | End: 2018-06-21

## 2018-06-21 RX ORDER — SODIUM CHLORIDE 0.9 % (FLUSH) 0.9 %
5-10 SYRINGE (ML) INJECTION EVERY 8 HOURS
Status: DISCONTINUED | OUTPATIENT
Start: 2018-06-21 | End: 2018-06-22 | Stop reason: HOSPADM

## 2018-06-21 RX ORDER — LIDOCAINE HYDROCHLORIDE 10 MG/ML
1-30 INJECTION, SOLUTION EPIDURAL; INFILTRATION; INTRACAUDAL; PERINEURAL
Status: DISCONTINUED | OUTPATIENT
Start: 2018-06-21 | End: 2018-06-21

## 2018-06-21 RX ORDER — ASPIRIN 325 MG
325 TABLET ORAL DAILY
Status: DISCONTINUED | OUTPATIENT
Start: 2018-06-22 | End: 2018-06-22 | Stop reason: HOSPADM

## 2018-06-21 RX ORDER — HEPARIN SODIUM 1000 [USP'U]/ML
1000-10000 INJECTION, SOLUTION INTRAVENOUS; SUBCUTANEOUS
Status: DISCONTINUED | OUTPATIENT
Start: 2018-06-21 | End: 2018-06-21

## 2018-06-21 RX ADMIN — HEPARIN SODIUM 1000 UNITS: 200 INJECTION, SOLUTION INTRAVENOUS at 15:18

## 2018-06-21 RX ADMIN — ATORVASTATIN CALCIUM 80 MG: 40 TABLET, FILM COATED ORAL at 10:27

## 2018-06-21 RX ADMIN — SODIUM CHLORIDE 100 ML/HR: 900 INJECTION, SOLUTION INTRAVENOUS at 12:09

## 2018-06-21 RX ADMIN — ESCITALOPRAM OXALATE 10 MG: 10 TABLET ORAL at 10:28

## 2018-06-21 RX ADMIN — MIDAZOLAM 2 MG: 1 INJECTION INTRAMUSCULAR; INTRAVENOUS at 15:07

## 2018-06-21 RX ADMIN — MIDAZOLAM HYDROCHLORIDE 2 MG: 1 INJECTION, SOLUTION INTRAMUSCULAR; INTRAVENOUS at 15:07

## 2018-06-21 RX ADMIN — HEPARIN SODIUM 10000 UNITS: 1000 INJECTION, SOLUTION INTRAVENOUS; SUBCUTANEOUS at 15:33

## 2018-06-21 RX ADMIN — ASPIRIN 81 MG: 81 TABLET, COATED ORAL at 10:28

## 2018-06-21 RX ADMIN — LIDOCAINE HYDROCHLORIDE 15 ML: 10 INJECTION, SOLUTION EPIDURAL; INFILTRATION; INTRACAUDAL; PERINEURAL at 15:27

## 2018-06-21 RX ADMIN — FENTANYL CITRATE 25 MCG: 50 INJECTION, SOLUTION INTRAMUSCULAR; INTRAVENOUS at 16:11

## 2018-06-21 RX ADMIN — HEPARIN SODIUM 4000 UNITS: 1000 INJECTION, SOLUTION INTRAVENOUS; SUBCUTANEOUS at 16:14

## 2018-06-21 RX ADMIN — CLOPIDOGREL BISULFATE 600 MG: 300 TABLET, FILM COATED ORAL at 10:28

## 2018-06-21 RX ADMIN — IOPAMIDOL 130 ML: 755 INJECTION, SOLUTION INTRAVENOUS at 15:51

## 2018-06-21 RX ADMIN — MIDAZOLAM HYDROCHLORIDE 1 MG: 1 INJECTION, SOLUTION INTRAMUSCULAR; INTRAVENOUS at 15:50

## 2018-06-21 RX ADMIN — MIDAZOLAM HYDROCHLORIDE 1 MG: 1 INJECTION, SOLUTION INTRAMUSCULAR; INTRAVENOUS at 16:04

## 2018-06-21 RX ADMIN — FENTANYL CITRATE 25 MCG: 50 INJECTION, SOLUTION INTRAMUSCULAR; INTRAVENOUS at 15:07

## 2018-06-21 RX ADMIN — NITROGLYCERIN 300 MCG: 5 INJECTION, SOLUTION INTRAVENOUS at 16:24

## 2018-06-21 RX ADMIN — HEPARIN SODIUM 3000 UNITS: 1000 INJECTION, SOLUTION INTRAVENOUS; SUBCUTANEOUS at 15:53

## 2018-06-21 RX ADMIN — METOPROLOL SUCCINATE 25 MG: 25 TABLET, EXTENDED RELEASE ORAL at 10:28

## 2018-06-21 RX ADMIN — MIDAZOLAM HYDROCHLORIDE 1 MG: 1 INJECTION, SOLUTION INTRAMUSCULAR; INTRAVENOUS at 16:18

## 2018-06-21 RX ADMIN — IOPAMIDOL 85 ML: 755 INJECTION, SOLUTION INTRAVENOUS at 16:27

## 2018-06-21 NOTE — PROGRESS NOTES
Hospitalist Progress Note    NAME: Elise Morel   :  1960   MRN:  [id number]       Assessment / Plan:    NSTEMI  -persistent chest pain. ECHO : EF 55%   -CTA chest and abd revealed no acute pathology. - second troponin positive at 0.62.   - Nitropaste,EKG and serial troponin ordered. Continue therapeutic lovenox, ASA, beta-blocker, statin.    -cardiology consult appreciated. -Cardiac cath :  Cath showed 100%RCA and high grade LCx. PCI planned today    CAD    HTN   BP is controlled. Holding home dose furosemide    Hyperlipidemia   -cont statin      Right renal papillary cell carcinoma  -Dx , scheduled to see Dr Humaira Quesada   -CT: Hypoenhancing right lower pole renal mass is consistent with papillary renal  cell carcinoma.     H/o AAA s/p repair    Vit D def     Subjective:     Chief Complaint / Reason for Physician Visit  Follow up for chest pain. No further cp. No n/v/abd pain. Review of Systems:  Symptom Y/N Comments  Symptom Y/N Comments   Fever/Chills n   Chest Pain n    Poor Appetite n   Edema n    Cough n   Abdominal Pain n    Sputum n   Joint Pain n    SOB/RAMIREZ n   Pruritis/Rash     Nausea/vomit n   Tolerating PT/OT     Diarrhea n   Tolerating Diet     Constipation n   Other       Could NOT obtain due to:      Objective:     VITALS:   Last 24hrs VS reviewed since prior progress note.  Most recent are:  Patient Vitals for the past 24 hrs:   Temp Pulse Resp BP SpO2   18 1406 98 °F (36.7 °C) 70 18 118/79 97 %   18 1221 98.4 °F (36.9 °C) 70 18 119/81 98 %   18 0728 98.3 °F (36.8 °C) 67 16 92/58 100 %   18 0314 98.5 °F (36.9 °C) 72 18 96/73 95 %   18 2247 97.5 °F (36.4 °C) 73 18 103/63 97 %   18 2100 - 64 - 93/45 91 %   18 - 60 - 105/70 94 %   18 1900 - 63 - 107/73 95 %   18 1700 - 75 - 110/83 98 %   18 1630 - 62 - 106/73 98 %   18 1600 - 61 - 103/76 100 %   18 1530 - (!) 56 - 92/69 97 %   18 1515 - (!) 56 - 99/71 98 %   06/20/18 1500 - 66 - 104/67 97 %   06/20/18 1458 - (!) 59 - 107/66 96 %   06/20/18 1455 - (!) 54 - 98/74 96 %   06/20/18 1454 98.1 °F (36.7 °C) 60 18 98/74 -   06/20/18 1430 - 60 26 91/59 99 %   06/20/18 1420 - (!) 58 18 94/59 92 %     No intake or output data in the 24 hours ending 06/21/18 1415     PHYSICAL EXAM:  General: WD, WN. Alert, cooperative, no acute distress    EENT:  EOMI. Anicteric sclerae. MMM  Resp:  CTA bilaterally, no wheezing or rales. No accessory muscle use  CV:  Regular  rhythm,  No edema  GI:  Soft, Non distended, Non tender.  +Bowel sounds  Neurologic:  Alert and oriented X 3, normal speech,   Psych:   Good insight. Not anxious nor agitated  Skin:  No rashes. No jaundice    Reviewed most current lab test results and cultures  YES  Reviewed most current radiology test results   YES  Review and summation of old records today    NO  Reviewed patient's current orders and MAR    YES  PMH/SH reviewed - no change compared to H&P  ________________________________________________________________________  ________________________________________________________________________  Joby Horton MD     Procedures: see electronic medical records for all procedures/Xrays and details which were not copied into this note but were reviewed prior to creation of Plan. LABS:  I reviewed today's most current labs and imaging studies.   Pertinent labs include:  Recent Labs      06/19/18   2230   WBC  8.6   HGB  14.4   HCT  44.3   PLT  215     Recent Labs      06/21/18   0313  06/20/18   0607  06/19/18   2230   NA  144  144  144   K  3.9  3.9  3.9   CL  110*  111*  109*   CO2  25  25  28   GLU  121*  108*  122*   BUN  14  13  14   CREA  0.82  0.95  1.02   CA  8.2*  8.6  9.3   MG   --   2.1   --    PHOS   --   2.6   --    ALB   --    --   3.8   TBILI   --    --   0.2   SGOT   --    --   15   ALT   --    --   29       Signed: Joby Horton MD

## 2018-06-21 NOTE — PROGRESS NOTES
Cardiology Progress Note      6/21/2018 1:36 PM    Admit Date: 6/19/2018          Subjective:  Stable- no chest pain today. For PCI later today          Visit Vitals    /81    Pulse 70    Temp 98.4 °F (36.9 °C)    Resp 18    Ht 5' 8\" (1.727 m)    Wt 100.4 kg (221 lb 5.5 oz)    SpO2 98%    BMI 33.65 kg/m2     06/19 1901 - 06/21 0700  In: 0   Out: 400 [Urine:400]        Objective:      Physical Exam:  VS as above  Chest CTA  Card RRR no changes      Data Review:   Labs:    BUN 14  Creat 0.8     Telemetry: SR       Assessment:     1. Acute coronary syndrome. 100% RCA with high grade Cx. Ef approx 40%  2. History of coronary artery disease with remote stenting of the left circumflex and right coronary artery in 2005. 3.  History of diastolic congestive heart failure associated with an acute coronary syndrome, normal EF by recent echo. 4.  Hypertension. 5.  Dyslipidemia. 6.  Ongoing tobacco abuse. 7.  Obesity. 8.  Renal cell mass with apparent renal cell carcinoma, undergoing evaluation. 9.  Prior abdominal aortic aneurysm repair. 10.  Prior hemicolectomy due to diverticulitis. Plan: Cont current Rx. For PCI with BMS later today.  Loaded with Plavix yest

## 2018-06-21 NOTE — PROGRESS NOTES
Cath reviewed and plans discussed with Dr Gail Ingram and patient. Will proceed with PCI of Cx marginal today. Will plan to use BMS due to the need for surgery for renal mass in near future. Will also look at RCA and see if that is feasible. OK for light breakfast then NPO.

## 2018-06-21 NOTE — PROGRESS NOTES
PCI of CX Marginal   PCI of RCA      NSTEMI    Versed and Fentanyl with continuous supervision 1507 - 1619    CX Marginal  80 - 90% stenosis. PCI with Integrity BMS  To 0% . BENJI 3 flow. RCA is large dominant artery . 100% occlusion.  within previous stent ( 2005 ). PCI with several Integrity BMS to cover long segment. 0 % . BENJI 3 flow. Angioseal.      No complications.

## 2018-06-21 NOTE — PROGRESS NOTES
Problem: Falls - Risk of  Goal: *Absence of Falls  Document Carter Fall Risk and appropriate interventions in the flowsheet.    Outcome: Progressing Towards Goal  Fall Risk Interventions:            Medication Interventions: Assess postural VS orthostatic hypotension, Evaluate medications/consider consulting pharmacy, Patient to call before getting OOB, Teach patient to arise slowly

## 2018-06-21 NOTE — PROCEDURES
[facility]  PROCEDURE NOTE    Cecilio Olivera  MR#: [id number]  : 1960  ACCOUNT #: [de-identified]   DATE OF SERVICE: 2018    PROCEDURE PERFORMED:  Left heart catheterization, coronary angiography, left ventriculography. :  Frankie Mixon MD    INDICATION:  Unstable angina. CATH NUMBER [id number]. TECHNIQUE:  Right femoral artery via Judkin's. CATHETERS USED:  5-Ethiopian pigtail, 5-Ethiopian JL4, 5-Ethiopian JR4. MEDICATIONS USED:  Please see the separate cath lab log sheet. Sedation was performed under the constant supervision of the attending physician from 1:17 p.m. until 1:31 p.m. COMPLICATIONS:  None. ESTIMATED BLOOD LOSS:  Less than 15 mL. SPECIMENS REMOVED:  None. FINDINGS:  1. Hemodynamics:  Aortic pressure was 99/59 with a mean of 74. Left ventricular pressure was 87/8 with an LVEDP of approximately 14. There was no significant pullback across the aortic valve. 2.  Left ventriculography done from the JULES view revealed a normal-sized left ventricle with mildly depressed systolic function. Estimated EF was 45-50%. There was inferior posterior hypokinesis present. No mitral regurgitation was seen. 3.  Coronary angiography revealed a right dominant system. The left main was a moderate size vessel without significant disease. The left anterior descending was a moderate size vessel extending through the apex with mild diffuse disease. There was a moderate sized first diagonal branch with diffuse moderate disease up to 50-60% in severity. D2 was a moderate to large size vessel without significant disease. The remainder of the LAD had mild luminal irregularities. There was a 30% area of narrowing in the mid LAD. The left circumflex was a moderate sized vessel. There was a complex lesion involving the origin of a large first diagonal branch, the proximal portion which had previously been stented.   There was a 70% area of narrowing prior to the takeoff of this marginal branch with a 70% area of narrowing in the ostium of the marginal branch. There was moderate in-stent restenosis in the proximal marginal branch. More distally, the AV groove circumflex had a 60-70% area of narrowing. This was just beyond the takeoff of the marginal branch. There was a moderate to large second marginal branch without significant disease. The remainder of the AV groove circumflex itself had no significant disease. The right coronary artery was a large, dominant vessel which was totally occluded in its mid portion. There were mild irregularities in the proximal portion of this vessel. The distal right coronary artery including the PDA and posterolateral branches were filled and well visualized via left to right collaterals. CONCLUSION:  1. Two-vessel coronary artery disease as described above. 2.  Mildly depressed LV systolic function with wall motion abnormalities described above. 3.  Normal resting LVEDP. RECOMMENDATIONS:  The patient will be considered for percutaneous intervention, which will be reported separately.       Xiomara Reese MD       [address] [place] [address]  D: 06/21/2018 14:19     T: 06/21/2018 14:37  JOB #: [id number]  CC: Adi Sandra MD

## 2018-06-22 VITALS
BODY MASS INDEX: 33.35 KG/M2 | TEMPERATURE: 97.9 F | DIASTOLIC BLOOD PRESSURE: 91 MMHG | HEIGHT: 68 IN | RESPIRATION RATE: 18 BRPM | HEART RATE: 73 BPM | SYSTOLIC BLOOD PRESSURE: 128 MMHG | OXYGEN SATURATION: 96 % | WEIGHT: 220.02 LBS

## 2018-06-22 LAB
ANION GAP SERPL CALC-SCNC: 11 MMOL/L (ref 5–15)
ATRIAL RATE: 65 BPM
ATRIAL RATE: 75 BPM
BUN SERPL-MCNC: 12 MG/DL (ref 6–20)
BUN/CREAT SERPL: 15 (ref 12–20)
CALCIUM SERPL-MCNC: 7.9 MG/DL (ref 8.5–10.1)
CALCULATED P AXIS, ECG09: 16 DEGREES
CALCULATED P AXIS, ECG09: 35 DEGREES
CALCULATED R AXIS, ECG10: 45 DEGREES
CALCULATED R AXIS, ECG10: 64 DEGREES
CALCULATED T AXIS, ECG11: -28 DEGREES
CALCULATED T AXIS, ECG11: -4 DEGREES
CHLORIDE SERPL-SCNC: 110 MMOL/L (ref 97–108)
CO2 SERPL-SCNC: 23 MMOL/L (ref 21–32)
CREAT SERPL-MCNC: 0.79 MG/DL (ref 0.7–1.3)
DIAGNOSIS, 93000: NORMAL
DIAGNOSIS, 93000: NORMAL
GLUCOSE SERPL-MCNC: 124 MG/DL (ref 65–100)
P-R INTERVAL, ECG05: 142 MS
P-R INTERVAL, ECG05: 150 MS
POTASSIUM SERPL-SCNC: 3.6 MMOL/L (ref 3.5–5.1)
Q-T INTERVAL, ECG07: 416 MS
Q-T INTERVAL, ECG07: 428 MS
QRS DURATION, ECG06: 98 MS
QRS DURATION, ECG06: 98 MS
QTC CALCULATION (BEZET), ECG08: 445 MS
QTC CALCULATION (BEZET), ECG08: 464 MS
SODIUM SERPL-SCNC: 144 MMOL/L (ref 136–145)
VENTRICULAR RATE, ECG03: 65 BPM
VENTRICULAR RATE, ECG03: 75 BPM

## 2018-06-22 PROCEDURE — 74011250637 HC RX REV CODE- 250/637: Performed by: INTERNAL MEDICINE

## 2018-06-22 PROCEDURE — 93005 ELECTROCARDIOGRAM TRACING: CPT

## 2018-06-22 PROCEDURE — 74011250637 HC RX REV CODE- 250/637: Performed by: HOSPITALIST

## 2018-06-22 PROCEDURE — 80048 BASIC METABOLIC PNL TOTAL CA: CPT | Performed by: INTERNAL MEDICINE

## 2018-06-22 PROCEDURE — 36415 COLL VENOUS BLD VENIPUNCTURE: CPT | Performed by: INTERNAL MEDICINE

## 2018-06-22 RX ORDER — ASPIRIN 325 MG
325 TABLET ORAL DAILY
Qty: 30 TAB | Refills: 6 | Status: SHIPPED | OUTPATIENT
Start: 2018-06-22

## 2018-06-22 RX ORDER — CLOPIDOGREL BISULFATE 75 MG/1
75 TABLET ORAL DAILY
Qty: 45 TAB | Refills: 0 | Status: SHIPPED | OUTPATIENT
Start: 2018-06-23

## 2018-06-22 RX ORDER — ATORVASTATIN CALCIUM 80 MG/1
80 TABLET, FILM COATED ORAL DAILY
Qty: 30 TAB | Refills: 6 | Status: SHIPPED | OUTPATIENT
Start: 2018-06-23 | End: 2022-06-03 | Stop reason: ALTCHOICE

## 2018-06-22 RX ORDER — METOPROLOL TARTRATE 50 MG/1
25 TABLET ORAL 2 TIMES DAILY
Qty: 30 TAB | Refills: 0 | Status: SHIPPED | OUTPATIENT
Start: 2018-06-22

## 2018-06-22 RX ADMIN — CLOPIDOGREL BISULFATE 75 MG: 75 TABLET ORAL at 08:38

## 2018-06-22 RX ADMIN — ATORVASTATIN CALCIUM 80 MG: 40 TABLET, FILM COATED ORAL at 08:38

## 2018-06-22 RX ADMIN — ASPIRIN 81 MG: 81 TABLET, COATED ORAL at 08:38

## 2018-06-22 RX ADMIN — METOPROLOL SUCCINATE 25 MG: 25 TABLET, EXTENDED RELEASE ORAL at 08:39

## 2018-06-22 RX ADMIN — ESCITALOPRAM OXALATE 10 MG: 10 TABLET ORAL at 08:38

## 2018-06-22 NOTE — DISCHARGE INSTRUCTIONS
Cardiology Discharge Summary     Smoking Cessation Program:   This is a free,  phone/text/email based, smoking cessation program. The program is individualized to meet each patient's needs. To enroll use this link [email or web address]    Patient ID:  Polly Mack  [id number]  73 y.o.  1960    Admit Date: 6/19/2018    Discharge Date: 6/22/2018     Admitting Physician: Tita Hutton MD     Discharge Physician: Owen Mason MD        Patient Instructions:       Referenced discharge instructions provided by nursing for diet and activity. Follow-up with Dr Deanne Woo 4 weeks [phone]     Signed:  Owen Mason MD  6/22/2018  8:58 AM   Cardiac Catheterization  Discharge Instructions        Do not drive, operate any machinery, or sign any legal documents for 24 hours after your procedure. You must have someone to drive you home.  You may take a shower 24 hours after your cardiac catheterization. Be sure to get the dressing wet and then remove it; gently wash the area with warm soapy water. Pat dry and leave open to air. To help prevent infections, be sure to keep the cath site clean and dry. No lotions, creams, powders, ointments, etc. in the cath site for approximately 1 week.  Do not take a tub bath, get in a hot tub or swimming pool for approximately 5 days or until the cath site is completely healed.  No strenuous activity or heavy lifting over 10 lbs. for  2 days.  Drink plenty of fluids for 24-48 hours after your cath to flush the contrast dye from your kidneys. No alcoholic beverages for 24 hours. You may resume your previous diet after your cath.  After your cath, some bruising or discomfort is common during the healing process. Tylenol, 1-2 tablets every 6 hours as needed, is recommended if you experience any discomfort.   If you experience any signs or symptoms of infection such as fever, chills, or poorly healing incision, persistent tenderness or swelling in the groin, redness and/or warmth to the touch, numbness, significant tingling or pain at the groin site or affected extremity, rash, drainage from the cath site, or if the leg feels tight or swollen, call your physician right away.  If bleeding at the cath site occurs, take a clean gauze pad and apply direct pressure to the groin just above the puncture site. Call 911 immediately, and continue to apply direct pressure until an ambulance gets to your location.  You may return to work  4  days after your cardiac cath.  The day after your procedure, call your doctors office for a follow-up appointment in the office for  4 weeks,  unless previously arranged.       Nurse Signature Date      Emiliano Vizcaino [address]   [phone]  [place], [address] [place]    [email or web address]

## 2018-06-22 NOTE — PROGRESS NOTES
Problem: Falls - Risk of  Goal: *Absence of Falls  Document Carter Fall Risk and appropriate interventions in the flowsheet.    Outcome: Progressing Towards Goal  Fall Risk Interventions:            Medication Interventions: Patient to call before getting OOB

## 2018-06-22 NOTE — PROGRESS NOTES
Discharge instructions reviewed with patient . Allowed adequate time to ask questions, all questions answered. Printed copy of AVS given to patient. All belongings gathered, IV and tele discontinued. Transported via wheelchair to main entrance and into care of family.

## 2018-06-22 NOTE — DISCHARGE SUMMARY
Hospitalist Discharge Summary     Patient ID:  Polly Mack  [id number]  62 y.o.  1960    PCP on record: Basil Jones MD    Admit date: 6/19/2018  Discharge date and time: 6/22/2018      DISCHARGE DIAGNOSIS:    NSTEMI  -persistent chest pain. ECHO 6/13: EF 55%   -CTA chest and abd revealed no acute pathology. CAD  HTN   Hyperlipidemia   Right renal papillary cell carcinoma  -Dx 6/7, scheduled to see Dr Brandie Nettles 06/25  -CT: Hypoenhancing right lower pole renal mass is consistent with papillary renal  cell carcinoma. H/o AAA s/p repair  Vit D def      CONSULTATIONS:  None    Excerpted HPI from H&P of Tita Hutton MD:    Amirah Ruiz is a 62 y.o.  male who presents with above complaint. Chest pain started after dinner yesterday. Pain is pressure or burning, mid sternal, 5-9/10, radiating to BL arms and jaw. He denies dyspnea/fever/chills/dizziness. He had similar episode on Monday which also started after eating dinner. On Monday episode lasted for approximately one hour and resolved on its own. Yesterday pain continue for couple of hours and pt decided to be evaluated in ED. He had significant cardiac history. He also noted one short episode of palpitation last night. On presentation to ED he was CP free. However, later on while still in ED pain started again. Pt reports taking TUMS at home without any relief of the pain. In ED he was treated with NTG with some relieve to his pain. ED provider discussed case with cardiology who recommended admission. Pt followed by Dr Deanne Woo. He was last seen 3 months ago for regular follow up. Recent ECHO was done by PCP to follow EF. Off note, pt was recently diagnosed with right kidney carcinoma and scheduled to see Marbella next Monday.     ______________________________________________________________________  DISCHARGE SUMMARY/HOSPITAL COURSE:  for full details see H&P, daily progress notes, labs, consult notes. No further chest pain. Second troponin positive at 0.62. Cardiology consult appreciated. Cardiac cath 6/20:  Cath showed 100%RCA and high grade LCx. PCI of CX Marginal done 6/21. PCI of RCA  done 6/21. Patient felt to be okay for dc home. Plavix prescribed by cardiology. Renal function normal at time of discharge.  _______________________________________________________________________  Patient seen and examined by me on discharge day. Pertinent Findings:  Gen:    Not in distress  Chest: Clear lungs  CVS:   Regular rhythm. No edema  Abd:  Soft, not distended, not tender  Neuro:  Alert, oriented  _______________________________________________________________________  DISCHARGE MEDICATIONS:   Discharge Medication List as of 6/22/2018  9:47 AM      START taking these medications    Details   clopidogrel (PLAVIX) 75 mg tab Take 1 Tab by mouth daily. , Print, Disp-45 Tab, R-0      aspirin (ASPIRIN) 325 mg tablet Take 1 Tab by mouth daily. , Print, Disp-30 Tab, R-6         CONTINUE these medications which have CHANGED    Details   atorvastatin (LIPITOR) 80 mg tablet Take 1 Tab by mouth daily. , Print, Disp-30 Tab, R-6      metoprolol tartrate (LOPRESSOR) 50 mg tablet Take 0.5 Tabs by mouth two (2) times a day., Normal, Disp-30 Tab, R-0         CONTINUE these medications which have NOT CHANGED    Details   escitalopram oxalate (LEXAPRO) 10 mg tablet TAKE 1 TABLET DAILY FOR    MOOD, Normal, Disp-90 Tab, R-3      furosemide (LASIX) 20 mg tablet Take  by mouth daily. , Historical Med         STOP taking these medications       aspirin delayed-release 81 mg tablet Comments:   Reason for Stopping:               My Recommended Diet, Activity, Wound Care, and follow-up labs are listed in the patient's Discharge Insturctions which I have personally completed and reviewed.     _______________________________________________________________________  DISPOSITION:    Home with Family: x   Home with HH/PT/OT/RN: SNF/LTC:    BARRERA:    OTHER:        Condition at Discharge:  Stable  _______________________________________________________________________  Follow up with:   PCP : Aric Méndez MD  Follow-up Information     Follow up With Details Comments Contact Info    [facility] EMERGENCY DEPT  If symptoms worsen [address] [address]  [phone]    Aric Méndez MD Schedule an appointment as soon as possible for a visit  [address] [place]  [address]  [address]      [facility] MD Ion Schedule an appointment as soon as possible for a visit  [address]  [phone]      Poonam Reed MD Go on 6/25/2018 2:00 [address]  [address]  [facility]  [phone]                Total time in minutes spent coordinating this discharge (includes going over instructions, follow-up, prescriptions, and preparing report for sign off to her PCP) :  25 minutes    Signed:  Franco Chaudhry MD

## 2018-06-22 NOTE — PROGRESS NOTES
Spiritual Care Partner Volunteer visited patient in [employer] on 6/21/18. Documented by:  CHRIS Rivera

## 2018-06-22 NOTE — CARDIO/PULMONARY
Consult acknowledged. Cardio-Pulm Rehab:  Chart reviewed and pt visited. Printed material given and discussed re: heart healthy habits, the cardiac diet, medication management, what to expect following coronary angioplasty, and post cardiac catheterization instructions. Discussed post catheterization restrictions including no lifting, no tub baths and no straining for 7 days. Also discussed what to do if bleeding or bruising at the cath insertion site is observed. Reviewed the cardiac diet (low NA/fat/CHOL), the importance of medication compliance, monitoring for any unusual signs & symptoms and when to call the doctor. Smoking history was assessed. The pt verbalized understanding. Added to discharge instructions:    Smoking Cessation Program:   This is a free,  phone/text/email based, smoking cessation program. The program is individualized to meet each patient's needs. To enroll use this link [email or web address]    Patient stated he works out at home and has lost 50 lbs since changing his diet and exercising 3x weekly. Patient knows that smoking is his biggest challenge right now and stated he will \"check out\" the online program.    Patient stated he is happy with his exercise routine right now and will call to schedule CP Rehab if he changes his mind.

## 2018-06-23 NOTE — DISCHARGE SUMMARY
[facility]  MR#: [id number]  : 1960  ACCOUNT #: [de-identified]   ADMIT DATE: 2018  DISCHARGE DATE: 2018    DISCHARGE DIAGNOSES:  1. Unstable angina, resolved. 2.  Status post PTCA and stenting of the circumflex marginal and right coronary artery with bare metal stents by Dr. Lakeshia Damon on 2018. 3.  Coronary artery disease with remote PTCA and stenting of the left circumflex and right coronary artery in , EF 45%. 4.  History of diastolic congestive heart failure, associated with initial presentation in . 5.  Hypertension. 6.  Dyslipidemia. 7.  Chronic tobacco abuse. 8.  Obesity. 9.  Presumed renal cell cancer, currently undergoing evaluation. 10.  Prior abdominal aortic aneurysm repair. 11.  Prior hemicolectomy due to diverticulitis. ADMISSION HISTORY AND PHYSICAL:  Please see the separately dictated note by Dr. Jovany Almaguer, and the consult by Dr. Shameka Baugh. HOSPITAL COURSE:  The patient was admitted with an acute coronary syndrome. He was initially admitted to the Hospitalist Service and received a dose of Lovenox in the ER as well as nitroglycerin paste. He continued to have some chest discomfort and was seen by Dr. Shameka Baugh. Cardiac enzymes were abnormal and peak troponin wolfgang to 3.23. Because of the patient's ongoing symptoms, he was taken to the cardiac catheterization lab. Coronary angiography on  showed 2-vessel coronary artery disease with a complex lesion involving a circumflex marginal vessel as well as a totally occluded right coronary artery. EF was estimated at 45-50% and LVEDP was normal.  The films were subsequently reviewed. Because of the patient's recently diagnosed probable renal cell cancer it was decided to hold off on immediate stenting to determine the probable course of treatment of this problem.   Dr. Mary Edward was consulted by phone and it was felt that the renal mass was likely in need of a partial nephrectomy which could be done in 6 weeks, but should not be delayed much greater than that amount of time. It was decided to treat the lesions with bare metal stents and  Dr. Garrett Johnston reviewed the films. The patient remained clinically stable throughout this period without recurrent chest pain. He was loaded with Plavix and medications were adjusted. Lipitor was increased. The following day, the patient was taken back to the cardiac catheterization lab by Dr. Lalo Mcocrmick and received bare metal stents to the circumflex marginal as well as a right coronary artery with a good result. The patient tolerated the procedure well without complications. Post-procedure, he was continued on Plavix and aspirin and hydrated. His renal function remained stable. On the day of discharge, he was doing well without further chest pain. DISPOSITION:  He was discharged to home with close outpatient followup arranged. DISCHARGE MEDICATIONS:  Lipitor 80 mg daily, metoprolol 25 mg b.i.d., Plavix 75 mg daily, Lexapro 10 mg daily, furosemide 20 mg daily, aspirin 325 mg daily. Followup was to be with Dr. Jacqueline Garcia in approximately 4 weeks. The patient was also to follow up with Dr. Hildred Schwab in the next few days and his primary care physician Dr. Gilson Kendrick.       Ryan Reeves MD       [facility]  D: 06/22/2018 09:09     T: 06/22/2018 19:08  JOB #: [id number]  CC: Dequan Tadeo MD  CC: Calixto Anderson MD

## 2018-06-25 ENCOUNTER — TELEPHONE (OUTPATIENT)
Dept: FAMILY MEDICINE CLINIC | Age: 58
End: 2018-06-25

## 2018-06-25 NOTE — TELEPHONE ENCOUNTER
Patient called stating he was discharged from [facility] on 05/22/18 for getting heart stints. He was advised to schedule an appt this week with Dr. Ignacio Sandoval but her next available for TC isn't until next week. I offered to schedule him with NP but he refused.  Please advise [phone]

## 2018-06-27 NOTE — PROGRESS NOTES
Please see multiple telephone and mychart encounters regarding this matter and for more information.

## 2018-06-28 LAB
ACT BLD: 246 SECS (ref 79–138)
ACT BLD: 263 SECS (ref 79–138)

## 2018-07-03 ENCOUNTER — OFFICE VISIT (OUTPATIENT)
Dept: FAMILY MEDICINE CLINIC | Age: 58
End: 2018-07-03

## 2018-07-03 VITALS
DIASTOLIC BLOOD PRESSURE: 68 MMHG | HEIGHT: 68 IN | TEMPERATURE: 98.4 F | SYSTOLIC BLOOD PRESSURE: 124 MMHG | WEIGHT: 218.7 LBS | BODY MASS INDEX: 33.15 KG/M2 | OXYGEN SATURATION: 98 % | HEART RATE: 70 BPM | RESPIRATION RATE: 16 BRPM

## 2018-07-03 DIAGNOSIS — I25.2 HISTORY OF NON-ST ELEVATION MYOCARDIAL INFARCTION (NSTEMI): ICD-10-CM

## 2018-07-03 DIAGNOSIS — I25.10 CORONARY ARTERY DISEASE INVOLVING NATIVE HEART WITHOUT ANGINA PECTORIS, UNSPECIFIED VESSEL OR LESION TYPE: ICD-10-CM

## 2018-07-03 DIAGNOSIS — Z09 HOSPITAL DISCHARGE FOLLOW-UP: Primary | ICD-10-CM

## 2018-07-03 DIAGNOSIS — F17.210 CIGARETTE SMOKER: ICD-10-CM

## 2018-07-03 DIAGNOSIS — C64.9 PAPILLARY RENAL CELL CARCINOMA (HCC): ICD-10-CM

## 2018-07-03 DIAGNOSIS — F41.9 ANXIETY: ICD-10-CM

## 2018-07-03 RX ORDER — ESCITALOPRAM OXALATE 10 MG/1
TABLET ORAL
Qty: 90 TAB | Refills: 3 | Status: SHIPPED | OUTPATIENT
Start: 2018-07-03 | End: 2019-02-06

## 2018-07-03 RX ORDER — ESCITALOPRAM OXALATE 10 MG/1
5 TABLET ORAL DAILY
COMMUNITY
End: 2018-07-03 | Stop reason: SDUPTHER

## 2018-07-03 NOTE — PATIENT INSTRUCTIONS
Elton Gonzalez TODAY, please go to:   CHECK OUT - If you received a referral, Show the     Please schedule the following appointments at CHECK OUT:  · Mood follow up with Dr. Che Childers in 4 weeks       Today's Plan:  Take lexapro 10mg daily  We will call you with some possible therapists    Go to Emergency deparment if you get chest pain again or have other symptom that worsens like shortness of breath, severe fatigue    See cardiology for follow up    Call Dr. Meng Lynch today.   _____________________   Consider signing up for [employer] to receive your results electronically.

## 2018-07-03 NOTE — PROGRESS NOTES
[address] [place] [address] Visit   07/03/2018  Patient ID: Alejandro Bliss is a 62 y.o. male. Assessment/Plan:    Diagnoses and all orders for this visit:    1. Hospital discharge follow-up    2. History of non-ST elevation myocardial infarction (NSTEMI)    3. Anxiety  -     escitalopram oxalate (LEXAPRO) 10 mg tablet; TAKE 1 TABLET DAILY FOR    MOOD    4. Papillary renal cell carcinoma ([facility] [address] based on CT result 6/2018    5. Cigarette smoker    6. Coronary artery disease involving native heart without angina pectoris, unspecified vessel or lesion type    has f/u with cardiology scheduled in 3 weeks  Continue f/u with [facility]  Plan to follow up with Dr. Kyle Zambrano. Increase lexapro from 5mg to 10mg, (he was only taking 1/2 tab before)  Given names of counselors from [place] to call  Will also look into other options that he can pursue  Contemplative re tobacco       See patient instructions for more. Counselled pt on Patient health concerns and plans. Patient was offered a choice/choices in the treatment plan today. Reviewed return precautions as appropriate. Patient expresses understanding of the plan and agrees with recommendations. Patient Instructions   . TODAY, please go to:   CHECK OUT - If you received a referral, Show the     Please schedule the following appointments at CHECK OUT:  · Mood follow up with Dr. Mario Bonilla in 4 weeks       Today's Plan:  Take lexapro 10mg daily  We will call you with some possible therapists    Go to Emergency deparment if you get chest pain again or have other symptom that worsens like shortness of breath, severe fatigue    See cardiology for follow up    Call Dr. Kyle Zambrano today.   _____________________   Consider signing up for [employer] to receive your results electronically.         Subjective:   HPI:  Alejandro Bliss is a 62 y.o. male being seen for:   Chief Complaint   Patient presents with    Fatigue     Post admission to [address] [place] [address], cardiac stents placed         Hospital follow up  ·  NSTEMI  · Admitted to [address] 6/19-6/22  · D/susan on plavix and aspirin   · Already on lipitor 80, metoptolol  \"Cardiac cath 6/20:  Cath showed 100%RCA and high grade LCx. PCI of CX Marginal done 6/21. PCI of RCA  done 6/21. \"  · No chest pain  · Sometimes clammy and sweaty. No SOB. Just tired all the time. · Notes a know in his groin    RCC  · To see onc on 6/25., saw [facility], Dr. Salma Sims  · Will see Dr. Batista Agent, has not heard from that office yet. 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 · Feels down  · Denies SI and HI  · Wife and sons know, wife is not very supportive. · Not sure who to go to for support  · Since feeling down, does not want to stop smoking. Screening and Prevention Due:  There are no preventive care reminders to display for this patient. Review of Systems  Otherwise as noted in HPI    Social History     Social History Narrative     History   Smoking Status    Current Every Day Smoker    Packs/day: 0.25    Types: Cigarettes   Smokeless Tobacco    Never Used     ? Objective:     Visit Vitals    /68 (BP 1 Location: Right arm, BP Patient Position: Sitting)    Pulse 70    Temp 98.4 °F (36.9 °C) (Oral)    Resp 16    Ht 5' 8\" (1.727 m)    Wt 218 lb 11.2 oz (99.2 kg)    SpO2 98%    BMI 33.25 kg/m2       BP Readings from Last 3 Encounters:   07/03/18 124/68   06/22/18 (!) 128/91   05/22/18 152/90       Wt Readings from Last 3 Encounters:   07/03/18 218 lb 11.2 oz (99.2 kg)   06/22/18 220 lb 0.3 oz (99.8 kg)   05/22/18 219 lb 8 oz (99.6 kg)       Physical Exam   Constitutional: He appears well-developed and well-nourished. No distress. Cardiovascular: Normal rate, regular rhythm and normal heart sounds. Exam reveals no gallop and no friction rub. No murmur heard. Pulmonary/Chest: Effort normal. No accessory muscle usage. No respiratory distress. He has no decreased breath sounds.  He has no wheezes. He has no rhonchi. He has no rales. Neurological: He is alert. Skin:   Right groin with appx 2cm firm nodule in skin. Psychiatric: He has a normal mood and affect. His behavior is normal.       No Known Allergies  Prior to Admission medications    Medication Sig Start Date End Date Taking? Authorizing Provider   escitalopram oxalate (LEXAPRO) 10 mg tablet TAKE 1 TABLET DAILY FOR    MOOD 7/3/18  Yes Luisito Royal. Hallie Calix MD   atorvastatin (LIPITOR) 80 mg tablet Take 1 Tab by mouth daily. 6/23/18  Yes Krupa Kaur MD   metoprolol tartrate (LOPRESSOR) 50 mg tablet Take 0.5 Tabs by mouth two (2) times a day. 6/22/18  Yes Krupa Kaur MD   clopidogrel (PLAVIX) 75 mg tab Take 1 Tab by mouth daily. 6/23/18  Yes Krupa Kaur MD   aspirin (ASPIRIN) 325 mg tablet Take 1 Tab by mouth daily. 6/22/18  Yes Krupa Kaur MD   furosemide (LASIX) 20 mg tablet Take  by mouth daily.    Yes Historical Provider     Patient Active Problem List   Diagnosis Code    Pure hypercholesterolemia E78.00    CAD (coronary artery disease) I25.10    Essential hypertension I10    Anxiety F41.9    Cigarette smoker F17.210    Vitamin D deficiency E55.9    Non compliance with medical treatment Z91.19    Colon cancer screening Z12.11    Papillary renal cell carcinoma ([facility] [address] based on CT result 6/2018 C64.9    Acquired ptosis of right eyelid H02.401    Blepharitis of eyelid of left eye H01.006    Blepharitis of eyelid of right eye H01.003    Cataract, nuclear sclerotic, both eyes H25.13    Unstable angina (HCC) I20.0

## 2018-07-03 NOTE — PROGRESS NOTES
Chief Complaint   Patient presents with    Fatigue     Post admission to [address] [place] [address], cardiac stents placed      1. Have you been to the ER, urgent care clinic since your last visit? Hospitalized since your last visit? Yes When: 06/20/18, chest pain, [facility]    2. Have you seen or consulted any other health care providers outside of the [address] [place] [address] since your last visit? Include any pap smears or colon screening. No    Visit Vitals    /68 (BP 1 Location: Right arm, BP Patient Position: Sitting)    Pulse 70    Temp 98.4 °F (36.9 °C) (Oral)    Resp 16    Ht 5' 8\" (1.727 m)    Wt 218 lb 11.2 oz (99.2 kg)    SpO2 98%    BMI 33.25 kg/m2     There are no preventive care reminders to display for this patient.     Geeta Her RN

## 2018-07-03 NOTE — MR AVS SNAPSHOT
[address] [place] [address] 
[address] [facility] [address] 
[phone] Patient: Gabbi Jiang MRN:  [id number] Visit Information Date & Time Provider Department Dept. Phone Encounter #  
 7/3/2018 11:00 AM Nam Mcgregor MD [facility] [phone] [id number] Upcoming Health Maintenance Date Due Influenza Age 5 to Adult 8/1/2018 Pneumococcal 19-64 Highest Risk (3 of 3 - PPSV23) 12/1/2021 DTaP/Tdap/Td series (3 - Td) 5/24/2025 COLONOSCOPY 1/10/2027 Allergies as of 7/3/2018  Review Complete On: 7/3/2018 By: Julián Trevino RN No Known Allergies Current Immunizations  Reviewed on 12/5/2017 Name Date H1N1 FLU VACCINE 1/21/2010 Influenza Vaccine 10/3/2017, 10/1/2016, 11/5/2015, 10/4/2013 Influenza Vaccine (Quad) PF 10/21/2014 Influenza Vaccine Split 10/31/2012, 1/6/2012, 9/21/2010 Pneumococcal Conjugate (PCV-13) 10/21/2014 Pneumococcal Polysaccharide (PPSV-23) 12/1/2016 TDAP Vaccine 5/29/2008 Tdap 5/24/2015  6:34 PM  
  
 Not reviewed this visit You Were Diagnosed With   
  
 Codes Comments Anxiety     ICD-10-CM: F41.9 ICD-9-CM: 300.00 Vitals BP Pulse Temp Resp Height(growth percentile) Weight(growth percentile) 124/68 (BP 1 Location: Right arm, BP Patient Position: Sitting) 70 98.4 °F (36.9 °C) (Oral) 16 5' 8\" (1.727 m) 218 lb 11.2 oz (99.2 kg) SpO2 BMI Smoking Status 98% 33.25 kg/m2 Current Every Day Smoker BMI and BSA Data Body Mass Index Body Surface Area  
 33.25 kg/m 2 2.18 m 2 Preferred Pharmacy Pharmacy Name Phone [facility] [address] [place] [facility] [place] [phone] Your Updated Medication List  
  
   
This list is accurate as of 7/3/18 12:01 PM.  Always use your most recent med list.  
  
  
  
  
 aspirin 325 mg tablet Commonly known as:  ASPIRIN  
 Take 1 Tab by mouth daily. atorvastatin 80 mg tablet Commonly known as:  LIPITOR Take 1 Tab by mouth daily. clopidogrel 75 mg Tab Commonly known as:  PLAVIX Take 1 Tab by mouth daily. escitalopram oxalate 10 mg tablet Commonly known as:  Branden Setters TAKE 1 TABLET DAILY FOR    MOOD  
  
 LASIX 20 mg tablet Generic drug:  furosemide Take  by mouth daily. metoprolol tartrate 50 mg tablet Commonly known as:  LOPRESSOR Take 0.5 Tabs by mouth two (2) times a day. Prescriptions Sent to Pharmacy Refills  
 escitalopram oxalate (LEXAPRO) 10 mg tablet 3 Sig: TAKE 1 TABLET DAILY FOR    MOOD Class: Normal  
 Pharmacy: [facility] [id number] [place] [facility]  #: [phone] Patient Instructions Milligan Junk TODAY, please go to: CHECK OUT - If you received a referral, Show the  Please schedule the following appointments at CHECK OUT: 
· Mood follow up with Dr. Richie Beard in 4 weeks Today's Plan: 
Take lexapro 10mg daily We will call you with some possible therapists Go to Emergency deparment if you get chest pain again or have other symptom that worsens like shortness of breath, severe fatigue See cardiology for follow up Call Dr. Yamila Gramajo today.  
_____________________ Consider signing up for NCR to receive your results electronically. Introducing [facility]! Dear Paul Guido: Thank you for requesting a [employer] account. Our records indicate that you already have an active [employer] account. You can access your account anytime at [email or web address] Did you know that you can access your hospital and ER discharge instructions at any time in [employer] Tops? You can also review all of your test results from your hospital stay or ER visit. Additional Information If you have questions, please visit the Frequently Asked Questions section of the [employer] website at [email or web address] Remember, [employer] is NOT to be used for urgent needs. For medical emergencies, dial 911. Now available from your iPhone and Android! Please provide this summary of care documentation to your next provider. Your primary care clinician is listed as Dequan Kelly. If you have any questions after today's visit, please call [phone].

## 2018-07-23 NOTE — PROCEDURES
[id number] [facility]    Eri Gentile  MR#: [id number]  : 1960  ACCOUNT #: [de-identified]   DATE OF SERVICE: 2018    PRIMARY CARE PHYSICIAN:  Sascha Be MD     PROCEDURE PERFORMED:    1. Percutaneous coronary intervention with stenting of the circumflex marginal artery. 2.  Percutaneous coronary intervention with stenting of the right coronary artery for chronic total occlusion. PREOPERATIVE DIAGNOSES:  1.  Non-ST elevation myocardial infarction. 2.  Multivessel coronary artery atherosclerosis. POSTOPERATIVE DIAGNOSES:  1.  Non-ST elevation myocardial infarction. 2.  Multivessel coronary artery atherosclerosis. ESTIMATED BLOOD LOSS:  Minimal.    COMPLICATIONS:  None. SEDATION:  Versed and fentanyl administered with continuous supervision starting at 1507 completing at 1619. Diagnostic angiography performed by Dr. Parker Ramsay is reported separately. TECHNIQUE:  A 6-Vietnamese EBU guide was engaged in the left coronary artery. Intravenous heparin was administered with anticoagulation monitored by ACT. A CrossFiberwater wire was advanced down the circumflex artery into the marginal branch. Balloon dilatation was performed. The vessel was then stented using a 3 mm diameter Integrity bare metal stent at high pressure. Repeat angiograms were performed in several projections. We then turned attention to the right coronary artery. A guiding catheter was engaged in the right coronary artery. The artery had a total occlusion in the mid segment. This was crossed with a Choice PT extra support wire. We were able to dilate the vessel, open using a 1.5 mm balloon and then used additional balloons to further dilate the vessel. Once the vessel was prepped it was stented using several Integrity bare metal stent in overlapping fashion throughout the proximal and mid vessel 3 mm in diameter. Repeat angiogram performed.     Catheter and sheaths were removed with hemostasis obtained with an Angio-Seal closure device. COMPLICATIONS:  None. HEMODYNAMICS: See record. DESCRIPTION:      Circumflex artery:  There was severe stenosis in the circumflex artery at the bifurcation of the marginal branch. The vessel was narrowed by 95%. The stenosis extended into a stent in the marginal branch which is narrowed by 80%. Angioplasty and stenting of the artery using a large diameter bare metal stent, there was 0% residual narrowing and distal flow was BENJI grade III. Right coronary artery:  The right coronary artery had been previously stented. This stent is a total occlusion in the proximal to mid segment. Following angioplasty and subsequent stenting throughout the proximal and mid vessel using a large Integrity bare metal stents, there is 0% residual narrowing and distal flow BENJI grade III.       Eneida Juarez MD       [address] [facility]  D: 07/23/2018 17:54     T: 07/23/2018 18:14  JOB #: [id number]  CC: Emily Rios MD

## 2018-11-21 ENCOUNTER — OFFICE VISIT (OUTPATIENT)
Dept: FAMILY MEDICINE CLINIC | Age: 58
End: 2018-11-21

## 2018-11-21 VITALS
DIASTOLIC BLOOD PRESSURE: 82 MMHG | HEART RATE: 80 BPM | HEIGHT: 68 IN | TEMPERATURE: 98.4 F | BODY MASS INDEX: 33.66 KG/M2 | RESPIRATION RATE: 18 BRPM | WEIGHT: 222.1 LBS | OXYGEN SATURATION: 98 % | SYSTOLIC BLOOD PRESSURE: 137 MMHG

## 2018-11-21 DIAGNOSIS — C64.9 PAPILLARY RENAL CELL CARCINOMA (HCC): ICD-10-CM

## 2018-11-21 DIAGNOSIS — G89.29 CHRONIC BILATERAL LOW BACK PAIN WITHOUT SCIATICA: ICD-10-CM

## 2018-11-21 DIAGNOSIS — H53.8 BLURRY VISION, BILATERAL: Primary | ICD-10-CM

## 2018-11-21 DIAGNOSIS — M54.50 CHRONIC BILATERAL LOW BACK PAIN WITHOUT SCIATICA: ICD-10-CM

## 2018-11-21 DIAGNOSIS — K13.0 ANGULAR CHEILITIS: ICD-10-CM

## 2018-11-21 DIAGNOSIS — I25.10 CORONARY ARTERY DISEASE INVOLVING NATIVE HEART WITHOUT ANGINA PECTORIS, UNSPECIFIED VESSEL OR LESION TYPE: ICD-10-CM

## 2018-11-21 NOTE — PROGRESS NOTES
Subjective:   Pat Rdz is a 62 y.o. male who was a previous patient of Dr. Michelle Tabares, who is no longer with the practice, and presents to establish with me. He works as a , owns his own business. Since last visit, he had right sided laparoscopic partial nephrectomy to remove renal cell carcinoma 8/13/2018. Did not require chemotherapy. He is followed by Dr. Lu Preston. He has an abdominal CT scan scheduled to follow up next week, and then sees Dr. Luna Conte on 12/4/2018. CAD - s/p NSTEMI 6/19/2018, s/p PCI CX Marginal and RCA  on 6/21/2018, followed at [address] [place] [address] by Dr. Arelis Perez, on lipitor, metoprolol, plavix, aspirin, lasix. Reports he had to reschedule recent follow up due to not having his copay with him    Low back pain - bilateral across lower back, intermittent, currently quiescent, feels it most when he rises from a seated position, he lifts weights several times a week, no radicular symptoms, he presented with right flank pain when diagnosed with the renal cell carcinoma so it concerns him    Blurry vision - chronic for about 4 years, had evaluation at [facility] around June 2018 and was told he has dry eyes. He was prescribed an eye drop which he took for one month without relief. He has not gone back for follow up, but states the blurry vision interferes with his work. He has not had punctate plugs. He does not wear glasses. He has tried Refresh without relief.     Mouth pain - he notes soreness at the angles of his mouth for 2 months, started after he began using lifesavers to try to quit smoking        Past Medical History:   Diagnosis Date    Anxiety     CAD (coronary artery disease)     PTCA dr roger forte   2/19/16 note/ekg    CAD (coronary artery disease)     PTCA Dr. Arelis Perez 06/2018    CHF (congestive heart failure) ([facility] [address] 2/2016    well compenstated per notes    Chronic kidney disease     mass on right kidney, diagnosed as cancer     Diverticulitis     with colon resection    Finger pain, right     Index finger- started 2 years- Painful if hit.  History of chicken pox     Hypercholesteremia     Hypertension     Insomnia     Kidney carcinoma ([facility] [address] 07/2018    right, partial nephrectomy 8/2018, Dr. Jeronimo Marley, no chemo    Knee pain 2009    ortho for cortisone injection    Papillary renal cell carcinoma ([facility] [address] based on CT result 6/2018 6/8/2018    Prediabetes     Screening cholesterol level 02/12/15    from [facility] clinic  and again 2/2016    Vitamin D deficiency     again 8/2014 again 12/2016     Social History     Tobacco Use    Smoking status: Current Every Day Smoker     Packs/day: 0.25     Types: Cigarettes    Smokeless tobacco: Never Used   Substance Use Topics    Alcohol use: Yes     Comment: rare social    Drug use: No     Outpatient Medications Marked as Taking for the 11/21/18 encounter (Office Visit) with Caitlin Muñiz PA-C   Medication Sig Dispense Refill    escitalopram oxalate (LEXAPRO) 10 mg tablet TAKE 1 TABLET DAILY FOR    MOOD 90 Tab 3    atorvastatin (LIPITOR) 80 mg tablet Take 1 Tab by mouth daily. 30 Tab 6    metoprolol tartrate (LOPRESSOR) 50 mg tablet Take 0.5 Tabs by mouth two (2) times a day. 30 Tab 0    clopidogrel (PLAVIX) 75 mg tab Take 1 Tab by mouth daily. 45 Tab 0    aspirin (ASPIRIN) 325 mg tablet Take 1 Tab by mouth daily. 30 Tab 6    furosemide (LASIX) 20 mg tablet Take  by mouth daily. No Known Allergies     Review of Systems  A comprehensive review of systems was negative except for that written in the HPI. Objective:     Visit Vitals  /82 (BP 1 Location: Left arm, BP Patient Position: Sitting)   Pulse 80   Temp 98.4 °F (36.9 °C) (Oral)   Resp 18   Ht 5' 8\" (1.727 m)   Wt 222 lb 1.6 oz (100.7 kg)   SpO2 98%   BMI 33.77 kg/m²     General:   alert, cooperative   Eyes: conjunctivae/scleras clear.  PERRL, EOM's intact   Ears: External auditory canals clear, tympanic membranes clear   Sinuses/Nose: No maxillary or frontal tenderness. Mouth:  Bilateral angular cheilitis, mild lip and tongue erythema, no exudates, mouth appears dry   Neck: Supple, trachea midline   Heart: S1 and S2 normal,no murmurs noted    Lungs: Clearto auscultation bilaterally, no increased work of breathing   Abdomen: Soft, nontender. Normal bowel sounds. No CVAT   Back: no spinal tenderness. normal spinal curvature. no paracervical spasm. Normal range of motion for flexion, extension, left and right lateral bending and twisting. Negative straight leg raise. Extremities: No edema or cyanosis          No results found for this visit on 11/21/18. Assessment/Plan:       ICD-10-CM ICD-9-CM    1. Blurry vision, bilateral H53.8 368.8 REFERRAL TO OPHTHALMOLOGY   2. Angular cheilitis K13.0 528.5 CBC WITH AUTOMATED DIFF      VITAMIN B12 & FOLATE   3. Chronic bilateral low back pain without sciatica M54.5 724.2     G89.29 338.29    4. Coronary artery disease involving native heart without angina pectoris, unspecified vessel or lesion type I25.10 414.01    5. Papillary renal cell carcinoma (HCC) based on CT result 6/2018 C64.9 189.0      Get Lotrimin over-the-counter and apply to the corner of your mouth twice daily for 2-3 weeks. Schedule with your cardiologist and continue medications as prescribed. Follow up with the eye doctor for the continued dry eyes. No relief with one eye drop tried. Consider punctate plugs. Resume Refresh. He has follow up scheduled with Dr Ebony Farmer with CT abdomen and office visit to follow recent partial right nephrectomy for renal cell carcinoma    Lumbar pain currently quiescent, handout given on low back exercises        Verbal and written instructions (see AVS) provided. Patient expresses understanding of diagnosis and treatment plan.

## 2018-11-21 NOTE — PROGRESS NOTES
Sally Wilson  Identified pt with two pt identifiers(name and ). Chief Complaint   Patient presents with   [facility].     rm 11/est care/lower back pain       1. Have you been to the ER, urgent care clinic since your last visit? Yes 2018  Hospitalized since your last visit? 2018 kidney mass    2. Have you seen or consulted any other health care providers outside of the [address] [place] [address] since your last visit? Include any pap smears or colon screening. no      Advance Care Planning    In the event something were to happen to you and you were unable to speak on your behalf, do you have an Advance Directive/ Living Will in place stating your wishes? NO    If yes, do we have a copy on file NO    If no, would you like information NO    Medication reconciliation up to date and corrected with patient at this time. Today's provider has been notified of reason for visit, vitals and flowsheets obtained on patients. Reviewed record in preparation for visit, huddled with provider and have obtained necessary documentation. Health Maintenance Due   Topic    Shingrix Vaccine Age 49> (1 of 2)    Influenza Age 5 to Adult        Wt Readings from Last 3 Encounters:   18 218 lb 11.2 oz (99.2 kg)   18 220 lb 0.3 oz (99.8 kg)   18 219 lb 8 oz (99.6 kg)     Temp Readings from Last 3 Encounters:   18 98.4 °F (36.9 °C) (Oral)   18 97.9 °F (36.6 °C)   18 97.2 °F (36.2 °C) (Oral)     BP Readings from Last 3 Encounters:   18 124/68   18 (!) 128/91   18 152/90     Pulse Readings from Last 3 Encounters:   18 70   18 73   18 72     There were no vitals filed for this visit.       Learning Assessment:  :     Learning Assessment 7/3/2018 3/3/2015   PRIMARY LEARNER Patient Patient   HIGHEST LEVEL OF EDUCATION - PRIMARY LEARNER  DID NOT GRADUATE HIGH SCHOOL TRADE SCHOOL   BARRIERS PRIMARY LEARNER NONE NONE   CO-LEARNER CAREGIVER No No PRIMARY LANGUAGE ENGLISH ENGLISH   LEARNER PREFERENCE PRIMARY READING LISTENING   ANSWERED BY patient patient   RELATIONSHIP SELF SELF       Depression Screening:  :     PHQ over the last two weeks 7/3/2018   Little interest or pleasure in doing things Not at all   Feeling down, depressed, irritable, or hopeless Not at all   Total Score PHQ 2 0       No flowsheet data found. Fall Risk Assessment:  :     No flowsheet data found. Abuse Screening:  :     Abuse Screening Questionnaire 7/3/2018   Do you ever feel afraid of your partner? N   Are you in a relationship with someone who physically or mentally threatens you? N   Is it safe for you to go home? Y       ADL Screening:  :     ADL Assessment 5/22/2018   Feeding yourself No Help Needed   Getting from bed to chair No Help Needed   Getting dressed No Help Needed   Bathing or showering No Help Needed   Walk across the room (includes cane/walker) No Help Needed   Using the telphone No Help Needed   Taking your medications No Help Needed   Preparing meals No Help Needed   Managing money (expenses/bills) No Help Needed   Moderately strenuous housework (laundry) No Help Needed   Shopping for personal items (toiletries/medicines) No Help Needed   Shopping for groceries No Help Needed   Driving Help Needed   Climbing a flight of stairs No Help Needed   Getting to places beyond walking distances No Help Needed           BMI:  Weight Metrics 7/3/2018 6/22/2018 6/19/2018 5/22/2018 12/5/2017 12/27/2016 12/1/2016   Weight 218 lb 11.2 oz 220 lb 0.3 oz - 219 lb 8 oz 227 lb 266 lb 11.2 oz 269 lb   BMI 33.25 kg/m2 - 33.45 kg/m2 32.89 kg/m2 34.01 kg/m2 39.96 kg/m2 40.31 kg/m2           Medication reconciliation up to date and corrected with patient at this time.

## 2018-11-21 NOTE — PATIENT INSTRUCTIONS
Get Lotrimin over-the-counter and apply to the corner of your mouth twice daily for 2-3 weeks. Schedule with your cardiologist    Follow up with the eye doctor for the continued dry eyes. Low Back Pain: Exercises  Your Care Instructions  Here are some examples of typical rehabilitation exercises for your condition. Start each exercise slowly. Ease off the exercise if you start to have pain. Your doctor or physical therapist will tell you when you can start these exercises and which ones will work best for you. How to do the exercises  Alternate arm and leg (bird dog) exercise    1. Start on the floor, on your hands and knees. 2. Tighten your belly muscles. 3. Raise one leg off the floor, and hold it straight out behind you. Be careful not to let your hip drop down, because that will twist your trunk. 4. Hold for about 6 seconds, then lower your leg and switch to the other leg. 5. Repeat 8 to 12 times on each leg. 6. Over time, work up to holding for 10 to 30 seconds each time. 7. If you feel stable and secure with your leg raised, try raising the opposite arm straight out in front of you at the same time. Knee-to-chest exercise    1. Lie on your back with your knees bent and your feet flat on the floor. 2. Bring one knee to your chest, keeping the other foot flat on the floor (or keeping the other leg straight, whichever feels better on your lower back). 3. Keep your lower back pressed to the floor. Hold for at least 15 to 30 seconds. 4. Relax, and lower the knee to the starting position. 5. Repeat with the other leg. Repeat 2 to 4 times with each leg. 6. To get more stretch, put your other leg flat on the floor while pulling your knee to your chest.    Curl-ups    1. Lie on the floor on your back with your knees bent at a 90-degree angle. Your feet should be flat on the floor, about 12 inches from your buttocks.   2. Cross your arms over your chest. If this bothers your neck, try putting your hands behind your neck (not your head), with your elbows spread apart. 3. Slowly tighten your belly muscles and raise your shoulder blades off the floor. 4. Keep your head in line with your body, and do not press your chin to your chest.  5. Hold this position for 1 or 2 seconds, then slowly lower yourself back down to the floor. 6. Repeat 8 to 12 times. Pelvic tilt exercise    1. Lie on your back with your knees bent. 2. \"Brace\" your stomach. This means to tighten your muscles by pulling in and imagining your belly button moving toward your spine. You should feel like your back is pressing to the floor and your hips and pelvis are rocking back. 3. Hold for about 6 seconds while you breathe smoothly. 4. Repeat 8 to 12 times. Heel dig bridging    1. Lie on your back with both knees bent and your ankles bent so that only your heels are digging into the floor. Your knees should be bent about 90 degrees. 2. Then push your heels into the floor, squeeze your buttocks, and lift your hips off the floor until your shoulders, hips, and knees are all in a straight line. 3. Hold for about 6 seconds as you continue to breathe normally, and then slowly lower your hips back down to the floor and rest for up to 10 seconds. 4. Do 8 to 12 repetitions. Hamstring stretch in doorway    1. Lie on your back in a doorway, with one leg through the open door. 2. Slide your leg up the wall to straighten your knee. You should feel a gentle stretch down the back of your leg. 3. Hold the stretch for at least 15 to 30 seconds. Do not arch your back, point your toes, or bend either knee. Keep one heel touching the floor and the other heel touching the wall. 4. Repeat with your other leg. 5. Do 2 to 4 times for each leg. Hip flexor stretch    1. Kneel on the floor with one knee bent and one leg behind you. Place your forward knee over your foot. Keep your other knee touching the floor.   2. Slowly push your hips forward until you feel a stretch in the upper thigh of your rear leg. 3. Hold the stretch for at least 15 to 30 seconds. Repeat with your other leg. 4. Do 2 to 4 times on each side. Wall sit    1. Stand with your back 10 to 12 inches away from a wall. 2. Lean into the wall until your back is flat against it. 3. Slowly slide down until your knees are slightly bent, pressing your lower back into the wall. 4. Hold for about 6 seconds, then slide back up the wall. 5. Repeat 8 to 12 times. Follow-up care is a key part of your treatment and safety. Be sure to make and go to all appointments, and call your doctor if you are having problems. It's also a good idea to know your test results and keep a list of the medicines you take. Where can you learn more? Go to http://sabrina-umesh.info/. Enter W646 in the search box to learn more about \"Low Back Pain: Exercises. \"  Current as of: November 29, 2017  Content Version: 11.8  © 0414-7924 [employer]. Care instructions adapted under license by [employer] (which disclaims liability or warranty for this information). If you have questions about a medical condition or this instruction, always ask your healthcare professional. [address] any warranty or liability for your use of this information.

## 2018-11-22 LAB
BASOPHILS # BLD AUTO: 0 X10E3/UL (ref 0–0.2)
BASOPHILS NFR BLD AUTO: 1 %
EOSINOPHIL # BLD AUTO: 0.2 X10E3/UL (ref 0–0.4)
EOSINOPHIL NFR BLD AUTO: 3 %
ERYTHROCYTE [DISTWIDTH] IN BLOOD BY AUTOMATED COUNT: 15.3 % (ref 12.3–15.4)
FOLATE SERPL-MCNC: 9.3 NG/ML
HCT VFR BLD AUTO: 44.7 % (ref 37.5–51)
HGB BLD-MCNC: 14.4 G/DL (ref 13–17.7)
IMM GRANULOCYTES # BLD: 0 X10E3/UL (ref 0–0.1)
IMM GRANULOCYTES NFR BLD: 0 %
LYMPHOCYTES # BLD AUTO: 1.8 X10E3/UL (ref 0.7–3.1)
LYMPHOCYTES NFR BLD AUTO: 26 %
MCH RBC QN AUTO: 28.4 PG (ref 26.6–33)
MCHC RBC AUTO-ENTMCNC: 32.2 G/DL (ref 31.5–35.7)
MCV RBC AUTO: 88 FL (ref 79–97)
MONOCYTES # BLD AUTO: 0.6 X10E3/UL (ref 0.1–0.9)
MONOCYTES NFR BLD AUTO: 9 %
NEUTROPHILS # BLD AUTO: 4.3 X10E3/UL (ref 1.4–7)
NEUTROPHILS NFR BLD AUTO: 61 %
PLATELET # BLD AUTO: 241 X10E3/UL (ref 150–379)
RBC # BLD AUTO: 5.07 X10E6/UL (ref 4.14–5.8)
VIT B12 SERPL-MCNC: 371 PG/ML (ref 232–1245)
WBC # BLD AUTO: 6.9 X10E3/UL (ref 3.4–10.8)

## 2018-11-28 ENCOUNTER — HOSPITAL ENCOUNTER (OUTPATIENT)
Dept: CT IMAGING | Age: 58
Discharge: HOME | End: 2018-11-28
Attending: UROLOGY
Payer: COMMERCIAL

## 2018-11-28 DIAGNOSIS — N28.89 RENAL MASS: ICD-10-CM

## 2018-11-28 LAB — CREAT BLD-MCNC: 0.8 MG/DL (ref 0.6–1.3)

## 2018-11-28 PROCEDURE — 74011636320 HC RX REV CODE- 636/320: Performed by: UROLOGY

## 2018-11-28 PROCEDURE — 74011250636 HC RX REV CODE- 250/636: Performed by: UROLOGY

## 2018-11-28 PROCEDURE — 74170 CT ABD WO CNTRST FLWD CNTRST: CPT

## 2018-11-28 PROCEDURE — 82565 ASSAY OF CREATININE: CPT

## 2018-11-28 RX ORDER — SODIUM CHLORIDE 0.9 % (FLUSH) 0.9 %
10 SYRINGE (ML) INJECTION
Status: COMPLETED | OUTPATIENT
Start: 2018-11-28 | End: 2018-11-28

## 2018-11-28 RX ORDER — SODIUM CHLORIDE 9 MG/ML
50 INJECTION, SOLUTION INTRAVENOUS
Status: COMPLETED | OUTPATIENT
Start: 2018-11-28 | End: 2018-11-28

## 2018-11-28 RX ADMIN — IOPAMIDOL 100 ML: 755 INJECTION, SOLUTION INTRAVENOUS at 11:03

## 2018-11-28 RX ADMIN — Medication 10 ML: at 11:03

## 2018-11-28 RX ADMIN — SODIUM CHLORIDE 50 ML/HR: 900 INJECTION, SOLUTION INTRAVENOUS at 11:03

## 2019-02-06 ENCOUNTER — OFFICE VISIT (OUTPATIENT)
Dept: FAMILY MEDICINE CLINIC | Age: 59
End: 2019-02-06

## 2019-02-06 VITALS
BODY MASS INDEX: 31.49 KG/M2 | SYSTOLIC BLOOD PRESSURE: 135 MMHG | HEIGHT: 68 IN | HEART RATE: 88 BPM | DIASTOLIC BLOOD PRESSURE: 81 MMHG | RESPIRATION RATE: 18 BRPM | WEIGHT: 207.8 LBS | OXYGEN SATURATION: 97 % | TEMPERATURE: 97.2 F

## 2019-02-06 DIAGNOSIS — H93.13 TINNITUS OF BOTH EARS: ICD-10-CM

## 2019-02-06 DIAGNOSIS — E78.00 HYPERCHOLESTEREMIA: Primary | ICD-10-CM

## 2019-02-06 DIAGNOSIS — F41.9 ANXIETY: ICD-10-CM

## 2019-02-06 DIAGNOSIS — R73.9 BLOOD GLUCOSE ELEVATED: ICD-10-CM

## 2019-02-06 RX ORDER — BUSPIRONE HYDROCHLORIDE 10 MG/1
5-10 TABLET ORAL
Qty: 90 TAB | Refills: 1 | Status: SHIPPED | OUTPATIENT
Start: 2019-02-06 | End: 2020-12-14

## 2019-02-06 RX ORDER — TAMSULOSIN HYDROCHLORIDE 0.4 MG/1
CAPSULE ORAL
Refills: 3 | COMMUNITY
Start: 2018-12-08 | End: 2020-12-14

## 2019-02-06 RX ORDER — ATORVASTATIN CALCIUM 10 MG/1
10 TABLET, FILM COATED ORAL
COMMUNITY
End: 2019-02-06 | Stop reason: SDUPTHER

## 2019-02-06 RX ORDER — FUROSEMIDE 20 MG/1
20 TABLET ORAL
COMMUNITY
End: 2019-02-06 | Stop reason: SDUPTHER

## 2019-02-06 NOTE — PROGRESS NOTES
Manjeet Bear  Identified pt with two pt identifiers(name and ). Chief Complaint   Patient presents with    Form Completion     rm 11/non fasting/ringing in ears x 2 years, vertigo, not today, eyes feel like they are bouncing will be seeing eye doctor next week     Patient has physician results form from [facility], patients waise circumference is 41.5\". 1. Have you been to the ER, urgent care clinic since your last visit? no  Hospitalized since your last visit? no    2. Have you seen or consulted any other health care providers outside of the [address] [place] [address] since your last visit? Include any pap smears or colon screening. no      Advance Care Planning    In the event something were to happen to you and you were unable to speak on your behalf, do you have an Advance Directive/ Living Will in place stating your wishes? NO    If yes, do we have a copy on file NO    If no, would you like information NO    Medication reconciliation up to date and corrected with patient at this time. Today's provider has been notified of reason for visit, vitals and flowsheets obtained on patients. Reviewed record in preparation for visit, huddled with provider and have obtained necessary documentation. Health Maintenance Due   Topic    Shingrix Vaccine Age 50> (1 of 2)       Wt Readings from Last 3 Encounters:   18 222 lb 1.6 oz (100.7 kg)   18 218 lb 11.2 oz (99.2 kg)   18 220 lb 0.3 oz (99.8 kg)     Temp Readings from Last 3 Encounters:   18 98.4 °F (36.9 °C) (Oral)   18 98.4 °F (36.9 °C) (Oral)   18 97.9 °F (36.6 °C)     BP Readings from Last 3 Encounters:   18 137/82   18 124/68   18 (!) 128/91     Pulse Readings from Last 3 Encounters:   18 80   18 70   18 73     There were no vitals filed for this visit.       Learning Assessment:  :     Learning Assessment 7/3/2018 3/3/2015   PRIMARY LEARNER Patient Patient HIGHEST LEVEL OF EDUCATION - PRIMARY LEARNER  DID NOT GRADUATE HIGH SCHOOL TRADE SCHOOL   BARRIERS PRIMARY LEARNER NONE NONE   CO-LEARNER CAREGIVER No No   PRIMARY LANGUAGE ENGLISH ENGLISH   LEARNER PREFERENCE PRIMARY READING LISTENING   ANSWERED BY patient patient   RELATIONSHIP SELF SELF       Depression Screening:  :     PHQ over the last two weeks 7/3/2018   Little interest or pleasure in doing things Not at all   Feeling down, depressed, irritable, or hopeless Not at all   Total Score PHQ 2 0       No flowsheet data found. Fall Risk Assessment:  :     No flowsheet data found. Abuse Screening:  :     Abuse Screening Questionnaire 7/3/2018   Do you ever feel afraid of your partner? N   Are you in a relationship with someone who physically or mentally threatens you? N   Is it safe for you to go home? Y       ADL Screening:  :     ADL Assessment 5/22/2018   Feeding yourself No Help Needed   Getting from bed to chair No Help Needed   Getting dressed No Help Needed   Bathing or showering No Help Needed   Walk across the room (includes cane/walker) No Help Needed   Using the telphone No Help Needed   Taking your medications No Help Needed   Preparing meals No Help Needed   Managing money (expenses/bills) No Help Needed   Moderately strenuous housework (laundry) No Help Needed   Shopping for personal items (toiletries/medicines) No Help Needed   Shopping for groceries No Help Needed   Driving Help Needed   Climbing a flight of stairs No Help Needed   Getting to places beyond walking distances No Help Needed           BMI:  Weight Metrics 11/21/2018 7/3/2018 6/22/2018 6/19/2018 5/22/2018 12/5/2017 12/27/2016   Weight 222 lb 1.6 oz 218 lb 11.2 oz 220 lb 0.3 oz - 219 lb 8 oz 227 lb 266 lb 11.2 oz   BMI 33.77 kg/m2 33.25 kg/m2 - 33.45 kg/m2 32.89 kg/m2 34.01 kg/m2 39.96 kg/m2           Medication reconciliation up to date and corrected with patient at this time.

## 2019-02-06 NOTE — PROGRESS NOTES
HPI:  62 y.o.  presents for follow up appointment. No hospital, ER or specialist visits since last primary care visit except as noted below. He has wellness form to fill out for insurance plan with his wife's work. It asks for fasting cholesterol results. Our last lipids on file are 12/2017. He has not seen Dr. Albertina Beckman in the last year for his CAD. He is taking lipitor 80 mg daily. He will see VEI 2/08/2019 to talk about procedure for his eyelids due to ptosis. 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 He lost his main referral source. He finds himself worrying a lot about finances now. He tried Lexapro prescribed by Dr. Emma Huerta in 12/2017, but he states it made his anxiety worse. He complains of tinnitus for 7-8 yrs that has worsened recently. He notices decreased hearing as well. Hyperglycemia noted on most recent CMP. No recent A1C. Patient Active Problem List    Diagnosis    Kidney carcinoma (HCC)    Unstable angina (HCC)    Acquired ptosis of right eyelid    Blepharitis of eyelid of left eye    Blepharitis of eyelid of right eye    Cataract, nuclear sclerotic, both eyes    Papillary renal cell carcinoma ([address] based on CT result 6/2018    Colon cancer screening    Non compliance with medical treatment    Vitamin D deficiency    Cigarette smoker    Anxiety    Pure hypercholesterolemia    CAD (coronary artery disease)    Essential hypertension         Past Medical History:   Diagnosis Date    Anxiety     CAD (coronary artery disease)     PTCA dr roger forte   2/19/16 note/ekg    CAD (coronary artery disease)     PTCA Dr. Eng Record 06/2018    CHF (congestive heart failure) ([address] 2/2016    well compenstated per notes    Chronic kidney disease     mass on right kidney, diagnosed as cancer     Diverticulitis     with colon resection    Finger pain, right     Index finger- started 2 years- Painful if hit.     History of chicken pox     Hypercholesteremia     Hypertension     Insomnia     Kidney carcinoma ([facility] 07/2018    right, partial nephrectomy 8/2018, Dr. Rosibel Echevarria, no chemo    Knee pain 2009    ortho for cortisone injection    Papillary renal cell carcinoma (HCC) based on CT result 6/2018 6/8/2018    Prediabetes     Screening cholesterol level 02/12/15    [facility]  and again 2/2016    Vitamin D deficiency     again 8/2014 again 12/2016       Social History     Tobacco Use    Smoking status: Current Every Day Smoker     Packs/day: 0.25     Types: Cigarettes    Smokeless tobacco: Never Used   Substance Use Topics    Alcohol use: Yes     Comment: rare social    Drug use: No       Outpatient Medications Marked as Taking for the 2/6/19 encounter (Office Visit) with Caitlin Muñiz PA-C   Medication Sig Dispense Refill    tamsulosin (FLOMAX) 0.4 mg capsule TAKE 1 CAPSULE BY MOUTH EVERY DAY IN THE EVENING  3    escitalopram oxalate (LEXAPRO) 10 mg tablet TAKE 1 TABLET DAILY FOR    MOOD 90 Tab 3    atorvastatin (LIPITOR) 80 mg tablet Take 1 Tab by mouth daily. 30 Tab 6    metoprolol tartrate (LOPRESSOR) 50 mg tablet Take 0.5 Tabs by mouth two (2) times a day. 30 Tab 0    clopidogrel (PLAVIX) 75 mg tab Take 1 Tab by mouth daily. 45 Tab 0    aspirin (ASPIRIN) 325 mg tablet Take 1 Tab by mouth daily. 30 Tab 6    furosemide (LASIX) 20 mg tablet Take  by mouth daily. No Known Allergies    ROS:  ROS negative except as per HPI.       PE:  Visit Vitals  /81 (BP 1 Location: Left arm, BP Patient Position: Sitting)   Pulse 88   Temp 97.2 °F (36.2 °C) (Oral)   Resp 18   Ht 5' 8\" (1.727 m)   Wt 207 lb 12.8 oz (94.3 kg)   SpO2 97%   BMI 31.60 kg/m²     Gen: alert, oriented, no acute distress  Head: normocephalic, atraumatic  Ears: external auditory canals clear, TMs without erythema or effusion  Eyes: pupils equal round reactive to light, sclera clear, conjunctiva clear  Oral: moist mucus membranes, no oral lesions, no pharyngeal inflammation or exudate  Neck: symmetric normal sized thyroid, no carotid bruits, no jugular vein distention  Resp: no increase work of breathing, lungs clear to ausculation bilaterally, no wheezing, rales or rhonchi  CV: S1, S2 normal.  No murmurs, rubs, or gallops. Abd: soft, not tender, not distended. No hepatosplenomegaly. Normal bowel sounds. Neuro: cranial nerves intact, normal strength and movement in all extremities, reflexes and sensation intact and symmetric. Skin: no lesion or rash  Extremities: no cyanosis or edema    No results found for this visit on 02/06/19. Lab Results   Component Value Date/Time    Cholesterol, total 216 (H) 12/05/2017 11:27 AM    HDL Cholesterol 34 (L) 12/05/2017 11:27 AM    LDL, calculated 128 (H) 12/05/2017 11:27 AM    VLDL, calculated 54 (H) 12/05/2017 11:27 AM    Triglyceride 269 (H) 12/05/2017 11:27 AM    CHOL/HDL Ratio 4.9 04/26/2010 10:29 AM       Lab Results   Component Value Date/Time    Hemoglobin A1c 6.0 (H) 05/07/2013 10:46 AM    Hemoglobin A1c (POC) 5.5 12/05/2017 10:22 AM     Lab Results   Component Value Date/Time    Sodium 144 06/22/2018 03:41 AM    Potassium 3.6 06/22/2018 03:41 AM    Chloride 110 (H) 06/22/2018 03:41 AM    CO2 23 06/22/2018 03:41 AM    Anion gap 11 06/22/2018 03:41 AM    Glucose 124 (H) 06/22/2018 03:41 AM    BUN 12 06/22/2018 03:41 AM    Creatinine 0.79 06/22/2018 03:41 AM    BUN/Creatinine ratio 15 06/22/2018 03:41 AM    GFR est AA >60 06/22/2018 03:41 AM    GFR est non-AA >60 06/22/2018 03:41 AM    Calcium 7.9 (L) 06/22/2018 03:41 AM    Bilirubin, total 0.2 06/19/2018 10:30 PM    AST (SGOT) 15 06/19/2018 10:30 PM    Alk.  phosphatase 88 06/19/2018 10:30 PM    Protein, total 7.4 06/19/2018 10:30 PM    Albumin 3.8 06/19/2018 10:30 PM    Globulin 3.6 06/19/2018 10:30 PM    A-G Ratio 1.1 06/19/2018 10:30 PM    ALT (SGPT) 29 06/19/2018 10:30 PM     Lab Results   Component Value Date/Time    WBC 6.9 11/21/2018 12:26 PM    HGB 14.4 11/21/2018 12:26 PM    HCT 44.7 11/21/2018 12:26 PM    PLATELET 949 06/07/5417 12:26 PM    MCV 88 11/21/2018 12:26 PM         Assessment/Plan:      ICD-10-CM ICD-9-CM    1. Hypercholesteremia - on lipitor 80 mg with h/o CAD, goal LDL <70, most recent , he had not seen cardiology in a year by his report, need cholesterol check for wellness form for being on his wife's health insurance plan, return for fasting labs, he is advised to schefule f/u with his cardiologist   E78.00 272.0 LIPID PANEL   2. Anxiety - understandably increased worry over his business not doing well, has had increased anxiety on lexapro in the past, begin trial of buspar, f/u 1 month   F41.9 300.00 busPIRone (BUSPAR) 10 mg tablet   3. Tinnitus of both ears - chronic x 7-8 yrs, increased recently with noted hearing loss, refer to ENT   H93.13 388.30 REFERRAL TO ENT-OTOLARYNGOLOGY   4. Blood glucose elevated - noted 124 in labs in June 2018, no recent A1C, no known h/o DM R73.9 790.29 HEMOGLOBIN A1C WITH EAG       Schedule fasting labs this week. Health Maintenance reviewed - updated. Medications Discontinued During This Encounter   Medication Reason    atorvastatin (LIPITOR) 10 mg tablet Duplicate Order    furosemide (LASIX) 20 mg tablet Duplicate Order       Current Outpatient Medications   Medication Sig Dispense Refill    tamsulosin (FLOMAX) 0.4 mg capsule TAKE 1 CAPSULE BY MOUTH EVERY DAY IN THE EVENING  3    busPIRone (BUSPAR) 10 mg tablet Take 0.5-1 Tabs by mouth every eight (8) hours as needed. 90 Tab 1    atorvastatin (LIPITOR) 80 mg tablet Take 1 Tab by mouth daily. 30 Tab 6    metoprolol tartrate (LOPRESSOR) 50 mg tablet Take 0.5 Tabs by mouth two (2) times a day. 30 Tab 0    clopidogrel (PLAVIX) 75 mg tab Take 1 Tab by mouth daily. 45 Tab 0    aspirin (ASPIRIN) 325 mg tablet Take 1 Tab by mouth daily. 30 Tab 6    furosemide (LASIX) 20 mg tablet Take  by mouth daily.          Recommended healthy diet low in carbohydrates, fats, sodium and cholesterol. Recommended regular cardiovascular exercise 3-6 times per week for 30-60 minutes daily. Verbal and written instructions (see AVS) provided. Patient expresses understanding of diagnosis and treatment plan. Follow-up Disposition:  Return in about 1 month (around 3/6/2019) for anxiety.

## 2019-02-06 NOTE — PATIENT INSTRUCTIONS
Schedule a fasting lab visit in the next week in the morning between 8-10AM.    Come in having nothing to eat or drink for 9 hours, except water, for the lab appointment to have your cholesterol checked. I will call you when I have the results and your form is complete. Schedule to see the ENT for the ringing in the ears. Schedule to see your cardiologist.    Start the buspirone as directed for the anxiety and return in 1 month. Anxiety Disorder: Care Instructions  Your Care Instructions    Anxiety is a normal reaction to stress. Difficult situations can cause you to have symptoms such as sweaty palms and a nervous feeling. In an anxiety disorder, the symptoms are far more severe. Constant worry, muscle tension, trouble sleeping, nausea and diarrhea, and other symptoms can make normal daily activities difficult or impossible. These symptoms may occur for no reason, and they can affect your work, school, or social life. Medicines, counseling, and self-care can all help. Follow-up care is a key part of your treatment and safety. Be sure to make and go to all appointments, and call your doctor if you are having problems. It's also a good idea to know your test results and keep a list of the medicines you take. How can you care for yourself at home? · Take medicines exactly as directed. Call your doctor if you think you are having a problem with your medicine. · Go to your counseling sessions and follow-up appointments. · Recognize and accept your anxiety. Then, when you are in a situation that makes you anxious, say to yourself, \"This is not an emergency. I feel uncomfortable, but I am not in danger. I can keep going even if I feel anxious. \"  · Be kind to your body:  ? Relieve tension with exercise or a massage. ? Get enough rest.  ? Avoid alcohol, caffeine, nicotine, and illegal drugs. They can increase your anxiety level and cause sleep problems. ? Learn and do relaxation techniques.  See below for more about these techniques. · Engage your mind. Get out and do something you enjoy. Go to a funny movie, or take a walk or hike. Plan your day. Having too much or too little to do can make you anxious. · Keep a record of your symptoms. Discuss your fears with a good friend or family member, or join a support group for people with similar problems. Talking to others sometimes relieves stress. · Get involved in social groups, or volunteer to help others. Being alone sometimes makes things seem worse than they are. · Get at least 30 minutes of exercise on most days of the week to relieve stress. Walking is a good choice. You also may want to do other activities, such as running, swimming, cycling, or playing tennis or team sports. Relaxation techniques  Do relaxation exercises 10 to 20 minutes a day. You can play soothing, relaxing music while you do them, if you wish. · Tell others in your house that you are going to do your relaxation exercises. Ask them not to disturb you. · Find a comfortable place, away from all distractions and noise. · Lie down on your back, or sit with your back straight. · Focus on your breathing. Make it slow and steady. · Breathe in through your nose. Breathe out through either your nose or mouth. · Breathe deeply, filling up the area between your navel and your rib cage. Breathe so that your belly goes up and down. · Do not hold your breath. · Breathe like this for 5 to 10 minutes. Notice the feeling of calmness throughout your whole body. As you continue to breathe slowly and deeply, relax by doing the following for another 5 to 10 minutes:  · Tighten and relax each muscle group in your body. You can begin at your toes and work your way up to your head. · Imagine your muscle groups relaxing and becoming heavy. · Empty your mind of all thoughts. · Let yourself relax more and more deeply. · Become aware of the state of calmness that surrounds you.   · When your relaxation time is over, you can bring yourself back to alertness by moving your fingers and toes and then your hands and feet and then stretching and moving your entire body. Sometimes people fall asleep during relaxation, but they usually wake up shortly afterward. · Always give yourself time to return to full alertness before you drive a car or do anything that might cause an accident if you are not fully alert. Never play a relaxation tape while you drive a car. When should you call for help? Call 911 anytime you think you may need emergency care. For example, call if:    · You feel you cannot stop from hurting yourself or someone else.   Gautam Cam the numbers for these national suicide hotlines: 0-409-454-TALK (0-904.668.9383) and 5-051-QZPIZHP (3-193.786.9832). If you or someone you know talks about suicide or feeling hopeless, get help right away.   Watch closely for changes in your health, and be sure to contact your doctor if:    · You have anxiety or fear that affects your life.     · You have symptoms of anxiety that are new or different from those you had before. Where can you learn more? Go to http://sabrina-umesh.info/. Enter P754 in the search box to learn more about \"Anxiety Disorder: Care Instructions. \"  Current as of: September 11, 2018  Content Version: 11.9  © 3929-0296 [employer], [employer]. Care instructions adapted under license by [employer] (which disclaims liability or warranty for this information). If you have questions about a medical condition or this instruction, always ask your healthcare professional. [address] any warranty or liability for your use of this information.

## 2019-02-07 DIAGNOSIS — R73.9 BLOOD GLUCOSE ELEVATED: ICD-10-CM

## 2019-02-07 DIAGNOSIS — E78.00 HYPERCHOLESTEREMIA: ICD-10-CM

## 2019-02-12 LAB
CHOLEST SERPL-MCNC: 175 MG/DL (ref 100–199)
EST. AVERAGE GLUCOSE BLD GHB EST-MCNC: 120 MG/DL
HBA1C MFR BLD: 5.8 % (ref 4.8–5.6)
HDLC SERPL-MCNC: 37 MG/DL
INTERPRETATION, 910389: NORMAL
LDLC SERPL CALC-MCNC: 114 MG/DL (ref 0–99)
TRIGL SERPL-MCNC: 120 MG/DL (ref 0–149)
VLDLC SERPL CALC-MCNC: 24 MG/DL (ref 5–40)

## 2019-02-21 NOTE — PROGRESS NOTES
Please notify that his cholesterol looks good and has improved over the last 2 years. His blood sugar test still shows early pre-diabetes, but improved from last check. Continue to work on healthy low fat diet, limit starches such as breads, crackers, rice, pasta.  His form is filled out for insurance wellness program.

## 2019-02-28 ENCOUNTER — DOCUMENTATION ONLY (OUTPATIENT)
Dept: FAMILY MEDICINE CLINIC | Age: 59
End: 2019-02-28

## 2019-04-08 ENCOUNTER — OFFICE VISIT (OUTPATIENT)
Dept: FAMILY MEDICINE CLINIC | Age: 59
End: 2019-04-08

## 2019-04-08 VITALS
OXYGEN SATURATION: 97 % | DIASTOLIC BLOOD PRESSURE: 74 MMHG | SYSTOLIC BLOOD PRESSURE: 118 MMHG | TEMPERATURE: 97.8 F | HEIGHT: 68 IN | RESPIRATION RATE: 18 BRPM | WEIGHT: 196.6 LBS | BODY MASS INDEX: 29.8 KG/M2 | HEART RATE: 90 BPM

## 2019-04-08 DIAGNOSIS — I25.10 CORONARY ARTERY DISEASE INVOLVING NATIVE HEART WITHOUT ANGINA PECTORIS, UNSPECIFIED VESSEL OR LESION TYPE: ICD-10-CM

## 2019-04-08 DIAGNOSIS — H93.13 TINNITUS OF BOTH EARS: ICD-10-CM

## 2019-04-08 DIAGNOSIS — R73.03 PREDIABETES: ICD-10-CM

## 2019-04-08 DIAGNOSIS — R26.81 UNSTEADY GAIT: ICD-10-CM

## 2019-04-08 DIAGNOSIS — F41.8 ANXIETY WITH DEPRESSION: ICD-10-CM

## 2019-04-08 DIAGNOSIS — G25.81 RESTLESS LEGS: ICD-10-CM

## 2019-04-08 DIAGNOSIS — M35.00 SICCA SYNDROME (HCC): ICD-10-CM

## 2019-04-08 DIAGNOSIS — H53.8 BLURRY VISION, BILATERAL: Primary | ICD-10-CM

## 2019-04-08 DIAGNOSIS — E78.00 HYPERCHOLESTEREMIA: ICD-10-CM

## 2019-04-08 RX ORDER — ESCITALOPRAM OXALATE 10 MG/1
TABLET ORAL
Qty: 30 TAB | Refills: 5 | Status: SHIPPED | OUTPATIENT
Start: 2019-04-08 | End: 2019-09-15 | Stop reason: SDUPTHER

## 2019-04-08 NOTE — PROGRESS NOTES
HPI:  62 y.o.  presents for follow up appointment. No hospital, ER or specialist visits since last primary care visit except as noted below. Blurry Vision - seen at [facility], he had a visit 2/08/2019 to review procedure for ptosis, he opted to not have the procedure due to concern for healing process. He uses OTC natural tears drop for dry eyes. He states he was advised of decreased vision but he states wearing corrective lenses does not correct his vision and it remains blurry with glasses. He states the eye doctor told him he may need to see neurologist.    He reports difficulty sleeping. Wakes easily in the middle of the night. 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 He tried Lexapro 2015 - 4/2018 prescribed as 10 mg daily, but he states he only took 1/2 pill (5 mg), he recalls feeling more anxious on it. No reports of snoring. He complains of dry mouth. We tried Buspar after last visit 2/06/2019, but states that seemed to make his legs feel restless. His legs have felt restless ever since and are worse at night. He feels flushed and hot. No suicidal ideations. He had right sided laparoscopic partial nephrectomy to remove renal cell carcinoma 8/13/2018. Did not require chemotherapy. He is followed by Dr. Devi Russell. Last appt was 12/2018. CAD - s/p NSTEMI 6/19/2018, s/p PCI CX Marginal and RCA  on 6/21/2018, followed at [address] [place] [address] by Dr. Bert Arias, on lipitor, metoprolol, plavix, aspirin, lasix.     Tinnitus - chronic for 7-8 yrs, has seen ENT, has hearing loss but can not afford hearing aids based on his insurance coverage    Patient Active Problem List    Diagnosis    Kidney carcinoma ([address]    Unstable angina ([address]    Acquired ptosis of right eyelid    Blepharitis of eyelid of left eye    Blepharitis of eyelid of right eye    Cataract, nuclear sclerotic, both eyes    Papillary renal cell carcinoma ([address] based on CT result 6/2018    Colon cancer screening    Non compliance with medical treatment    Vitamin D deficiency    Cigarette smoker    Anxiety    Pure hypercholesterolemia    CAD (coronary artery disease)    Essential hypertension         Past Medical History:   Diagnosis Date    Anxiety     CAD (coronary artery disease)     PTCA dr roger forte   2/19/16 note/ekg    CAD (coronary artery disease)     PTCA Dr. Loyd Luong 06/2018    CHF (congestive heart failure) ([facility] 2/2016    well compenstated per notes    Chronic kidney disease     mass on right kidney, diagnosed as cancer     Diverticulitis     with colon resection    Finger pain, right     Index finger- started 2 years- Painful if hit.  History of chicken pox     Hypercholesteremia     Hypertension     Insomnia     Kidney carcinoma ([facility] 07/2018    right, partial nephrectomy 8/2018, Dr. Woods Brochure, no chemo    Knee pain 2009    ortho for cortisone injection    [facility] based on CT result 6/2018 6/8/2018    Prediabetes     Screening cholesterol level 02/12/15    from [facility]  and again 2/2016    Vitamin D deficiency     again 8/2014 again 12/2016       Social History     Tobacco Use    Smoking status: Current Every Day Smoker     Packs/day: 0.25     Types: Cigarettes    Smokeless tobacco: Never Used   Substance Use Topics    Alcohol use: Yes     Comment: rare social    Drug use: No       Outpatient Medications Marked as Taking for the 4/8/19 encounter (Office Visit) with Caitiln Muñiz PA-C   Medication Sig Dispense Refill    tamsulosin (FLOMAX) 0.4 mg capsule TAKE 1 CAPSULE BY MOUTH EVERY DAY IN THE EVENING  3    busPIRone (BUSPAR) 10 mg tablet Take 0.5-1 Tabs by mouth every eight (8) hours as needed. 90 Tab 1    atorvastatin (LIPITOR) 80 mg tablet Take 1 Tab by mouth daily. 30 Tab 6    metoprolol tartrate (LOPRESSOR) 50 mg tablet Take 0.5 Tabs by mouth two (2) times a day. 30 Tab 0    clopidogrel (PLAVIX) 75 mg tab Take 1 Tab by mouth daily.  45 Tab 0    aspirin (ASPIRIN) 325 mg tablet Take 1 Tab by mouth daily. 30 Tab 6    furosemide (LASIX) 20 mg tablet Take  by mouth daily. No Known Allergies    ROS:  ROS negative except as per HPI. PE:  Visit Vitals  /74 (BP 1 Location: Right leg, BP Patient Position: Sitting)   Pulse 90   Temp 97.8 °F (36.6 °C) (Oral)   Resp 18   Ht 5' 8\" (1.727 m)   Wt 196 lb 9.6 oz (89.2 kg)   SpO2 97%   BMI 29.89 kg/m²     Visual acuity checked:  OU 20/40  OS 20/100  OD 20/40    Gen: alert, oriented, no acute distress  Head: normocephalic, atraumatic  Ears: external auditory canals clear, TMs without erythema or effusion  Eyes: pupils equal round reactive to light, sclera clear, conjunctiva clear, bilateral ptosis, no nystagmus   Oral: moist mucus membranes, no oral lesions, no pharyngeal inflammation or exudate  Neck: symmetric normal sized thyroid, no carotid bruits, no jugular vein distention  Resp: no increase work of breathing, lungs clear to ausculation bilaterally, no wheezing, rales or rhonchi  CV: S1, S2 normal.  No murmurs, rubs, or gallops. Abd: soft, not tender, not distended. No hepatosplenomegaly. Normal bowel sounds. Neuro: cranial nerves intact, normal strength and movement in all extremities, reflexes and sensation intact and symmetric. Normal Rhomberg. Normal finger to nose, and heel to shin. He is off-balance on heel to toe walk, unable to walk straight line. Skin: no lesion or rash  Extremities: no cyanosis or edema    No results found for this visit on 04/08/19. Assessment/Plan:      ICD-10-CM ICD-9-CM    1. Blurry vision, bilateral - declines surgery to correct ptosis, states corrective lenses do not correct the blurry vision, states ophthalmology advised that he see neuro   H53.8 368.8 REFERRAL TO NEUROLOGY   2. Tinnitus of both ears - chronic x 7-8 yrs, unable to afford hearing aid device to treat H93.13 388.30 REFERRAL TO NEUROLOGY   3.  Unsteady gait - unable to heel to toe walk, long standing blurry vision and tinnitus R26.81 781.2 REFERRAL TO NEUROLOGY      METABOLIC PANEL, COMPREHENSIVE      TSH 3RD GENERATION      T4, FREE      CBC WITH AUTOMATED DIFF   4. Hypercholesteremia - labs 2/11/2019 show , goal <70 with known CAD, on lipitor from Dr. Todd Skinner. Follow up with Dr. Todd Skinner about your cholesterol and heart disease. Ask him about your cholesterol, and whether Crestor or Silvia Calender would be an option for you. E78.00 272.0 REFERRAL TO CARDIOLOGY      METABOLIC PANEL, COMPREHENSIVE      CBC WITH AUTOMATED DIFF   5. Coronary artery disease involving native heart without angina pectoris, unspecified vessel or lesion type - s/p NSTEMI 6/19/2018, s/p PCI CX Marginal and RCA  on 6/21/2018, followed at [address] [place] [address] by Dr. Todd Skniner, on lipitor, metoprolol, plavix, aspirin, lasix. I25.10 414.01 REFERRAL TO CARDIOLOGY      METABOLIC PANEL, COMPREHENSIVE      CBC WITH AUTOMATED DIFF   6. Sicca syndrome (HCC) - c/o dry mouth and dry eyes, sees ophthalmology and uses natural tears   M35.00 710.2 SJOGREN'S ABS, SSA AND SSB   7. Anxiety with depression - was on lexapro in the past, recently lost his job and has trouble sleeping, Start the lexapro at 1/2 tablet for 8 days, then go to a whole tablet and stay at a whole tablet long term. F41.8 300.4 escitalopram oxalate (LEXAPRO) 10 mg tablet      TSH 3RD GENERATION      T4, FREE      TESTOSTERONE, FREE & TOTAL   8. Restless legs - at night, started after taking busprione which he has discontinued without relief of symptoms    G25.81 333.94 REFERRAL TO NEUROLOGY   9. Prediabetes - A1C 5.8% on 2/11/2019, reviewed lowering starches in diet, recheck 6 mos R73.03 790.29        Health Maintenance reviewed - updated. Recommended healthy diet low in carbohydrates, fats, sodium and cholesterol. Recommended regular cardiovascular exercise 3-6 times per week for 30-60 minutes daily. Verbal and written instructions (see AVS) provided.   Patient expresses understanding of diagnosis and treatment plan. Follow-up and Dispositions    · Return in about 6 weeks (around 5/20/2019).

## 2019-04-08 NOTE — PATIENT INSTRUCTIONS
Follow up with Dr. Trent Hill about your cholesterol and heart disease. Ask him about your cholesterol, and whether Crestor or Car Reach would be an option for you. Start the lexapro at 1/2 tablet for 8 days, then go to a whole tablet and stay at a whole tablet long term. Try these lifestyle strategies to lower your cholesterol:   Decrease saturated fats in diet. Saturated fats are found in:   o meats including fatty beef, pork, lamb, chicken or turkey with skin, and ground beef,   o high fat cheese,   o whole fat diary, milk, cream and ice cream,  o butter  o most saturated fats are found in animal products   Eliminate Trans Fats from your diet. Foods that contain TF include:  o Fast foods: fried chicken, biscuits, fried fish for fried fish sandwichs, [place] fries  o Donuts and muffins  o Crackers and cookies  o Cake, cake icing and pies  o Canned biscuits or refrigerated dough  o Microwave popcorn  o Coffee creamer  o Frozen pizza  o Stick margarine or vegetable shortening   Increase the amount of soluble fiber in your diet. Sources of soluble fiber include:  o oats, peas, beans, apples, citrus fruits, carrots, barley and psyllium   Include omega-3 fatty acids in your diet, these are found in:   o FISH! Try and eat 2 servings of fish a week. Good examples include salmon, albacore tuna, halibut, trout and mackerel.  o Take a fish oil supplement daily   Look for foods enriched with plant-sterols. There are many products on the market which are fortified with this nutrient including buttery spreads and yogurts. Look for the Benechol and Promise brands.  Increase your physical activity to at least 150 minutes a week. This is just 5 sessions of thirty minutes a week!  Losing weight can significantly lower your cholesterol.  If you are a smoker, QUIT SMOKING, this can significantly lower your cholesterol and overall cardiovascular risk.  It also can help to decrease your risk for many other conditions. You have Prediabetes. This means your blood sugars are higher than normal on average. Prediabetes is a warning sign that you are at risk for getting type 2 diabetes. Most people who get type 2 diabetes have prediabetes first. The good news is that lifestyle changes may help you get your blood sugar back to normal and avoid or delay diabetes. The following can help you improve and control your blood sugar. · Food Choices  · Be Mindful of CARBOHYDRATES. Carbohydrates= sugars. This means breads, pasta, rice, chips, desserts, starchy vegetables, etc.  · Decrease how OFTEN you have carbohydrates  · Decrease how MUCH carbohydrates you eat at one time  · Choose carbs that are better for you, like whole grain. · Try to eliminate sugar from your drinks. (Soda, sweet tea, lemonade, juice, gatorade, alcohol, etc). · Let me know if you would like to see a Nutritionist to discuss more. A Nutritionist can help you develop a meal plan that fits your lifestyle. · Physical Activity  · Move more. · Try to walk 150 minutes per week. · Try to walk 10,000 steps per day  · Consider counting your steps on your smart phone. · Weight Loss  · Moving more and eating better are good for you no matter what. · If you go enough of them you can achieve a healthier weight. This will help your blood sugars. · Do Not Smoke  · Smoking can make prediabetes worse. · If you need help quitting, talk to me about stop-smoking programs and medicines. These can increase your chances of quitting for good. Watch closely for changes in your health, and be sure to contact me if:  You have any symptoms of diabetes. These may include:   Being thirsty more often. Urinating more. Being hungrier. Losing weight. Being very tired. Having blurry vision. You have a wound that will not heal.   You have an infection that will not go away. You have problems with your blood pressure.     Follow-up care is a key part of your treatment and safety. Please make an appointment in 6 months to reassess your prediabetes and A1c value.

## 2019-04-08 NOTE — PROGRESS NOTES
Rajesh Abbott  Identified pt with two pt identifiers(name and ). Chief Complaint   Patient presents with    Fatigue     rm 10/non fasting/lab follow up/not sleeping       1. Have you been to the ER, urgent care clinic since your last visit? Hospitalized since your last visit? no    2. Have you seen or consulted any other health care providers outside of the [address] [place] [address] since your last visit? Include any pap smears or colon screening. no      Advance Care Planning    In the event something were to happen to you and you were unable to speak on your behalf, do you have an Advance Directive/ Living Will in place stating your wishes? NO    If yes, do we have a copy on file NO    If no, would you like information NO    Medication reconciliation up to date and corrected with patient at this time. Today's provider has been notified of reason for visit, vitals and flowsheets obtained on patients. Reviewed record in preparation for visit, huddled with provider and have obtained necessary documentation. Health Maintenance Due   Topic    Shingrix Vaccine Age 50> (1 of 2)       Wt Readings from Last 3 Encounters:   19 207 lb 12.8 oz (94.3 kg)   18 222 lb 1.6 oz (100.7 kg)   18 218 lb 11.2 oz (99.2 kg)     Temp Readings from Last 3 Encounters:   19 97.2 °F (36.2 °C) (Oral)   18 98.4 °F (36.9 °C) (Oral)   18 98.4 °F (36.9 °C) (Oral)     BP Readings from Last 3 Encounters:   19 135/81   18 137/82   18 124/68     Pulse Readings from Last 3 Encounters:   19 88   18 80   18 70     There were no vitals filed for this visit.       Learning Assessment:  :     Learning Assessment 2019 7/3/2018 3/3/2015   PRIMARY LEARNER Patient Patient Patient   HIGHEST LEVEL OF EDUCATION - PRIMARY LEARNER  DID NOT GRADUATE HIGH SCHOOL DID NOT GRADUATE HIGH SCHOOL TRADE SCHOOL   BARRIERS PRIMARY LEARNER NONE NONE NONE   CO-LEARNER CAREGIVER No No No   PRIMARY LANGUAGE ENGLISH ENGLISH ENGLISH   LEARNER PREFERENCE PRIMARY LISTENING READING LISTENING   ANSWERED BY patient patient patient   RELATIONSHIP SELF SELF SELF       Depression Screening:  :     3 most recent PHQ Screens 2/6/2019   Little interest or pleasure in doing things Not at all   Feeling down, depressed, irritable, or hopeless Several days   Total Score PHQ 2 1       No flowsheet data found. Fall Risk Assessment:  :     No flowsheet data found. Abuse Screening:  :     Abuse Screening Questionnaire 2/6/2019 7/3/2018   Do you ever feel afraid of your partner? N N   Are you in a relationship with someone who physically or mentally threatens you? N N   Is it safe for you to go home? Y Y       ADL Screening:  :     ADL Assessment 5/22/2018   Feeding yourself No Help Needed   Getting from bed to chair No Help Needed   Getting dressed No Help Needed   Bathing or showering No Help Needed   Walk across the room (includes cane/walker) No Help Needed   Using the telphone No Help Needed   Taking your medications No Help Needed   Preparing meals No Help Needed   Managing money (expenses/bills) No Help Needed   Moderately strenuous housework (laundry) No Help Needed   Shopping for personal items (toiletries/medicines) No Help Needed   Shopping for groceries No Help Needed   Driving Help Needed   Climbing a flight of stairs No Help Needed   Getting to places beyond walking distances No Help Needed           BMI:  Weight Metrics 2/6/2019 11/21/2018 7/3/2018 6/22/2018 6/19/2018 5/22/2018 12/5/2017   Weight 207 lb 12.8 oz 222 lb 1.6 oz 218 lb 11.2 oz 220 lb 0.3 oz - 219 lb 8 oz 227 lb   BMI 31.6 kg/m2 33.77 kg/m2 33.25 kg/m2 - 33.45 kg/m2 32.89 kg/m2 34.01 kg/m2           Medication reconciliation up to date and corrected with patient at this time.

## 2019-04-09 ENCOUNTER — TELEPHONE (OUTPATIENT)
Dept: FAMILY MEDICINE CLINIC | Age: 59
End: 2019-04-09

## 2019-04-09 LAB
ALBUMIN SERPL-MCNC: 4.4 G/DL (ref 3.5–5.5)
ALBUMIN/GLOB SERPL: 1.6 {RATIO} (ref 1.2–2.2)
ALP SERPL-CCNC: 112 IU/L (ref 39–117)
ALT SERPL-CCNC: 21 IU/L (ref 0–44)
AST SERPL-CCNC: 16 IU/L (ref 0–40)
BASOPHILS # BLD AUTO: 0 X10E3/UL (ref 0–0.2)
BASOPHILS NFR BLD AUTO: 0 %
BILIRUB SERPL-MCNC: 0.4 MG/DL (ref 0–1.2)
BUN SERPL-MCNC: 9 MG/DL (ref 6–24)
BUN/CREAT SERPL: 11 (ref 9–20)
CALCIUM SERPL-MCNC: 9.5 MG/DL (ref 8.7–10.2)
CHLORIDE SERPL-SCNC: 104 MMOL/L (ref 96–106)
CO2 SERPL-SCNC: 26 MMOL/L (ref 20–29)
CREAT SERPL-MCNC: 0.85 MG/DL (ref 0.76–1.27)
ENA SS-A AB SER-ACNC: <0.2 AI (ref 0–0.9)
ENA SS-B AB SER-ACNC: <0.2 AI (ref 0–0.9)
EOSINOPHIL # BLD AUTO: 0.1 X10E3/UL (ref 0–0.4)
EOSINOPHIL NFR BLD AUTO: 2 %
ERYTHROCYTE [DISTWIDTH] IN BLOOD BY AUTOMATED COUNT: 14.5 % (ref 12.3–15.4)
GLOBULIN SER CALC-MCNC: 2.8 G/DL (ref 1.5–4.5)
GLUCOSE SERPL-MCNC: 95 MG/DL (ref 65–99)
HCT VFR BLD AUTO: 44.4 % (ref 37.5–51)
HGB BLD-MCNC: 14.6 G/DL (ref 13–17.7)
IMM GRANULOCYTES # BLD AUTO: 0 X10E3/UL (ref 0–0.1)
IMM GRANULOCYTES NFR BLD AUTO: 0 %
LYMPHOCYTES # BLD AUTO: 1.3 X10E3/UL (ref 0.7–3.1)
LYMPHOCYTES NFR BLD AUTO: 19 %
MCH RBC QN AUTO: 28.7 PG (ref 26.6–33)
MCHC RBC AUTO-ENTMCNC: 32.9 G/DL (ref 31.5–35.7)
MCV RBC AUTO: 87 FL (ref 79–97)
MONOCYTES # BLD AUTO: 0.5 X10E3/UL (ref 0.1–0.9)
MONOCYTES NFR BLD AUTO: 7 %
NEUTROPHILS # BLD AUTO: 5 X10E3/UL (ref 1.4–7)
NEUTROPHILS NFR BLD AUTO: 72 %
PLATELET # BLD AUTO: 208 X10E3/UL (ref 150–379)
POTASSIUM SERPL-SCNC: 4.4 MMOL/L (ref 3.5–5.2)
PROT SERPL-MCNC: 7.2 G/DL (ref 6–8.5)
RBC # BLD AUTO: 5.08 X10E6/UL (ref 4.14–5.8)
SODIUM SERPL-SCNC: 146 MMOL/L (ref 134–144)
T4 FREE SERPL-MCNC: 1.11 NG/DL (ref 0.82–1.77)
TESTOST FREE SERPL-MCNC: 5.7 PG/ML (ref 7.2–24)
TESTOST SERPL-MCNC: 414 NG/DL (ref 264–916)
TSH SERPL DL<=0.005 MIU/L-ACNC: 1.78 UIU/ML (ref 0.45–4.5)
WBC # BLD AUTO: 6.9 X10E3/UL (ref 3.4–10.8)

## 2019-04-09 NOTE — TELEPHONE ENCOUNTER
----- Message from Naveen Delgado sent at 4/9/2019  2:47 PM EDT -----  Regarding: Loc Perez PA-C/ telephone   Pt  Would like a call back to set these appts for neuro and cardiology.  Pt was also told he would get a prescription and nothing has been called into [facility] (information is on file) Pts contact [phone]

## 2019-04-17 NOTE — PROGRESS NOTES
Testosterone slightly low, recommend referral for treatment. Sodium level slightly elevated, be sure to drink plenty of water. My chart message sent.

## 2019-05-09 ENCOUNTER — OFFICE VISIT (OUTPATIENT)
Dept: NEUROLOGY | Age: 59
End: 2019-05-09

## 2019-05-09 VITALS
DIASTOLIC BLOOD PRESSURE: 64 MMHG | OXYGEN SATURATION: 99 % | BODY MASS INDEX: 29.1 KG/M2 | SYSTOLIC BLOOD PRESSURE: 122 MMHG | HEIGHT: 68 IN | WEIGHT: 192 LBS | HEART RATE: 71 BPM

## 2019-05-09 DIAGNOSIS — R29.898 WEAKNESS OF BOTH LEGS: ICD-10-CM

## 2019-05-09 DIAGNOSIS — E78.5 HYPERLIPOPROTEINEMIA: ICD-10-CM

## 2019-05-09 DIAGNOSIS — R29.2 HYPERREFLEXIA: ICD-10-CM

## 2019-05-09 DIAGNOSIS — R27.0 ATAXIA: Primary | ICD-10-CM

## 2019-05-09 NOTE — PATIENT INSTRUCTIONS
A Healthy Lifestyle: Care Instructions  Your Care Instructions    A healthy lifestyle can help you feel good, stay at a healthy weight, and have plenty of energy for both work and play. A healthy lifestyle is something you can share with your whole family. A healthy lifestyle also can lower your risk for serious health problems, such as high blood pressure, heart disease, and diabetes. You can follow a few steps listed below to improve your health and the health of your family. Follow-up care is a key part of your treatment and safety. Be sure to make and go to all appointments, and call your doctor if you are having problems. It's also a good idea to know your test results and keep a list of the medicines you take. How can you care for yourself at home? · Do not eat too much sugar, fat, or fast foods. You can still have dessert and treats now and then. The goal is moderation. · Start small to improve your eating habits. Pay attention to portion sizes, drink less juice and soda pop, and eat more fruits and vegetables. ? Eat a healthy amount of food. A 3-ounce serving of meat, for example, is about the size of a deck of cards. Fill the rest of your plate with vegetables and whole grains. ? Limit the amount of soda and sports drinks you have every day. Drink more water when you are thirsty. ? Eat at least 5 servings of fruits and vegetables every day. It may seem like a lot, but it is not hard to reach this goal. A serving or helping is 1 piece of fruit, 1 cup of vegetables, or 2 cups of leafy, raw vegetables. Have an apple or some carrot sticks as an afternoon snack instead of a candy bar. Try to have fruits and/or vegetables at every meal.  · Make exercise part of your daily routine. You may want to start with simple activities, such as walking, bicycling, or slow swimming. Try to be active 30 to 60 minutes every day. You do not need to do all 30 to 60 minutes all at once.  For example, you can exercise 3 times a day for 10 or 20 minutes. Moderate exercise is safe for most people, but it is always a good idea to talk to your doctor before starting an exercise program.  · Keep moving. Octaviano Caceres the lawn, work in the garden, or [employer]. Take the stairs instead of the elevator at work. · If you smoke, quit. People who smoke have an increased risk for heart attack, stroke, cancer, and other lung illnesses. Quitting is hard, but there are ways to boost your chance of quitting tobacco for good. ? Use nicotine gum, patches, or lozenges. ? Ask your doctor about stop-smoking programs and medicines. ? Keep trying. In addition to reducing your risk of diseases in the future, you will notice some benefits soon after you stop using tobacco. If you have shortness of breath or asthma symptoms, they will likely get better within a few weeks after you quit. · Limit how much alcohol you drink. Moderate amounts of alcohol (up to 2 drinks a day for men, 1 drink a day for women) are okay. But drinking too much can lead to liver problems, high blood pressure, and other health problems. Family health  If you have a family, there are many things you can do together to improve your health. · Eat meals together as a family as often as possible. · Eat healthy foods. This includes fruits, vegetables, lean meats and dairy, and whole grains. · Include your family in your fitness plan. Most people think of activities such as jogging or tennis as the way to fitness, but there are many ways you and your family can be more active. Anything that makes you breathe hard and gets your heart pumping is exercise. Here are some tips:  ? Walk to do errands or to take your child to school or the bus.  ? Go for a family bike ride after dinner instead of watching TV. Where can you learn more? Go to http://sabrina-umesh.info/. Enter Z947 in the search box to learn more about \"A Healthy Lifestyle: Care Instructions. \"  Current as of: September 11, 2018  Content Version: 11.9  © 4670-1407 [employer], [employer]. Care instructions adapted under license by [employer] (which disclaims liability or warranty for this information). If you have questions about a medical condition or this instruction, always ask your healthcare professional. [address] any warranty or liability for your use of this information. Office Policies      Paperwork            Any paperwork that needs to be completed has a 3 WEEK turnaround time. Phone calls/patient messages:    · Please allow up to 24 hours for someone in the office to contact you about your call or message. Be mindful your provider may be out of the office or your message may require further review. We encourage you to use [employer] for your messages as this is a faster, more efficient way to communicate with our office           Medication Refills:    · Prescription medications require up to 48 business hours to process. We encourage you to use [employer] for your refills. · For controlled medications: Please allow up to 72 business hours to process. Certain medications may require you to  a written prescription at our office. · NO narcotic/controlled medications will be prescribed after 4pm Monday through Friday or on weekends                Office Policies  o Phone calls/patient messages:  Please allow up to 24 hours for someone in the office to contact you about your call or message. Be mindful your provider may be out of the office or your message may require further review. We encourage you to use [employer] for your messages as this is a faster, more efficient way to communicate with our office  o Medication Refills:  Prescription medications require up to 48 business hours to process. We encourage you to use [employer] for your refills. For controlled medications: Please allow up to 72 business hours to process.  Certain medications may require you to  a written prescription at our office. NO narcotic/controlled medications will be prescribed after 4pm Monday through Friday or on weekends  o Form/Paperwork Completion:   We ask that you allow 7-14 business days. You may also download your forms to [employer] to have your doctor print off. A Healthy Lifestyle: Care Instructions  Your Care Instructions    A healthy lifestyle can help you feel good, stay at a healthy weight, and have plenty of energy for both work and play. A healthy lifestyle is something you can share with your whole family. A healthy lifestyle also can lower your risk for serious health problems, such as high blood pressure, heart disease, and diabetes. You can follow a few steps listed below to improve your health and the health of your family. Follow-up care is a key part of your treatment and safety. Be sure to make and go to all appointments, and call your doctor if you are having problems. It's also a good idea to know your test results and keep a list of the medicines you take. How can you care for yourself at home? · Do not eat too much sugar, fat, or fast foods. You can still have dessert and treats now and then. The goal is moderation. · Start small to improve your eating habits. Pay attention to portion sizes, drink less juice and soda pop, and eat more fruits and vegetables. ? Eat a healthy amount of food. A 3-ounce serving of meat, for example, is about the size of a deck of cards. Fill the rest of your plate with vegetables and whole grains. ? Limit the amount of soda and sports drinks you have every day. Drink more water when you are thirsty. ? Eat at least 5 servings of fruits and vegetables every day. It may seem like a lot, but it is not hard to reach this goal. A serving or helping is 1 piece of fruit, 1 cup of vegetables, or 2 cups of leafy, raw vegetables. Have an apple or some carrot sticks as an afternoon snack instead of a candy bar.  Try to have fruits and/or vegetables at every meal.  · Make exercise part of your daily routine. You may want to start with simple activities, such as walking, bicycling, or slow swimming. Try to be active 30 to 60 minutes every day. You do not need to do all 30 to 60 minutes all at once. For example, you can exercise 3 times a day for 10 or 20 minutes. Moderate exercise is safe for most people, but it is always a good idea to talk to your doctor before starting an exercise program.  · Keep moving. Jeri Boys the lawn, work in the garden, or [employer]. Take the stairs instead of the elevator at work. · If you smoke, quit. People who smoke have an increased risk for heart attack, stroke, cancer, and other lung illnesses. Quitting is hard, but there are ways to boost your chance of quitting tobacco for good. ? Use nicotine gum, patches, or lozenges. ? Ask your doctor about stop-smoking programs and medicines. ? Keep trying. In addition to reducing your risk of diseases in the future, you will notice some benefits soon after you stop using tobacco. If you have shortness of breath or asthma symptoms, they will likely get better within a few weeks after you quit. · Limit how much alcohol you drink. Moderate amounts of alcohol (up to 2 drinks a day for men, 1 drink a day for women) are okay. But drinking too much can lead to liver problems, high blood pressure, and other health problems. Family health  If you have a family, there are many things you can do together to improve your health. · Eat meals together as a family as often as possible. · Eat healthy foods. This includes fruits, vegetables, lean meats and dairy, and whole grains. · Include your family in your fitness plan. Most people think of activities such as jogging or tennis as the way to fitness, but there are many ways you and your family can be more active. Anything that makes you breathe hard and gets your heart pumping is exercise.  Here are some tips:  ? Walk to do errands or to take your child to school or the bus.  ? Go for a family bike ride after dinner instead of watching TV. Where can you learn more? Go to http://sabrina-umesh.info/. Enter G949 in the search box to learn more about \"A Healthy Lifestyle: Care Instructions. \"  Current as of: September 11, 2018  Content Version: 11.9  © 3186-8562 [employer], [employer]. Care instructions adapted under license by [employer] (which disclaims liability or warranty for this information). If you have questions about a medical condition or this instruction, always ask your healthcare professional. [address] any warranty or liability for your use of this information.

## 2019-05-09 NOTE — PROGRESS NOTES
NEUROLOGY HISTORY AND PHYSICAL    Name Dawn Conrad Age 62 y.o. MRN [id number]  1960     Referring Physician: Janel Dawson PA-C      Chief Complaint: leg weakness     This is a 62 y.o. Right handed male who comes with a complaint of steadily progressive weakness of both legs since 2018. It is there all day. No exacerbating or relieving factors. No family history of any similar issues. He had the right kidney removed in august because of cancer. Also hs a history of abdominal aneurysm s/p repair    Assessment and Plan  1. Ataxia  Differential includes, cervical myelopathy lyme, syphilis, b12 deficiency and other less common etiologies such as monoclonal gammopathies or copper deficiency   - MRI CERV SPINE WO CONT; Future  - PROTEIN ELECTROPHORESIS  - VITAMIN B12  - RPR  - LYME AB TOTAL W/RFLX W BLOT    2. Hyperreflexia    - MRI CERV SPINE WO CONT; Future    3. Weakness of both legs    - VITAMIN B12  - RPR  - LYME AB TOTAL W/RFLX W BLOT    4. Hyperlipoproteinemia  Continue atorvastatin        Patient Allergies  Patient has no known allergies. Current Outpatient Medications   Medication Sig    escitalopram oxalate (LEXAPRO) 10 mg tablet Take 1/2 tab po daily x 8 days, then take 1 tab daily and stay at this dose    tamsulosin (FLOMAX) 0.4 mg capsule TAKE 1 CAPSULE BY MOUTH EVERY DAY IN THE EVENING    atorvastatin (LIPITOR) 80 mg tablet Take 1 Tab by mouth daily.  clopidogrel (PLAVIX) 75 mg tab Take 1 Tab by mouth daily.  aspirin (ASPIRIN) 325 mg tablet Take 1 Tab by mouth daily.  furosemide (LASIX) 20 mg tablet Take  by mouth daily.  busPIRone (BUSPAR) 10 mg tablet Take 0.5-1 Tabs by mouth every eight (8) hours as needed.  metoprolol tartrate (LOPRESSOR) 50 mg tablet Take 0.5 Tabs by mouth two (2) times a day. No current facility-administered medications for this visit.         Past Medical History:   Diagnosis Date    Anxiety     CAD (coronary artery disease) PTCA dr Smitha Price   2/19/16 note/ekg    CAD (coronary artery disease)     PTCA Dr. Wolfe Player 06/2018    CHF [facility]congestive heart failure) ([facility] 2/2016    well compenstated per notes    Chronic kidney disease     mass on right kidney, diagnosed as cancer     Diverticulitis     with colon resection    Finger pain, right     Index finger- started 2 years- Painful if hit.  History of chicken pox     Hypercholesteremia     Hypertension     Insomnia     Kidney carcinoma ([facility] 07/2018    right, partial nephrectomy 8/2018, Dr. Natalia De La Garza, no chemo    Knee pain 2009    ortho for cortisone injection    Papillary renal cell carcinoma ([facility] based on CT result 6/2018 6/8/2018    Prediabetes     Screening cholesterol level 02/12/15    [facility]  and again 2/2016    Vitamin D deficiency     again 8/2014 again 12/2016       Social History     Tobacco Use    Smoking status: Current Every Day Smoker     Packs/day: 0.25     Types: Cigarettes    Smokeless tobacco: Never Used   Substance Use Topics    Alcohol use: Yes     Comment: rare social       Family History   Problem Relation Age of Onset    Diabetes Mother     Heart Disease Father      Review of Systems   Constitutional: Positive for malaise/fatigue. Negative for chills and fever. HENT: Negative for ear pain. Eyes: Negative for pain and discharge. Respiratory: Negative for cough and hemoptysis. Cardiovascular: Negative for chest pain and claudication. Gastrointestinal: Negative for constipation and diarrhea. Genitourinary: Negative for flank pain and hematuria. Musculoskeletal: Positive for myalgias and neck pain. Negative for back pain. Skin: Negative for itching and rash. Neurological: Positive for sensory change and weakness. Negative for headaches. Endo/Heme/Allergies: Negative for environmental allergies. Does not bruise/bleed easily. Psychiatric/Behavioral: Negative for depression and hallucinations.  The patient is nervous/anxious. Exam  Visit Vitals  /64   Pulse 71   Ht 5' 8\" (1.727 m)   Wt 192 lb (87.1 kg)   SpO2 99%   BMI 29.19 kg/m²      General: Well developed, well nourished. Patient in no apparent distress   Head: Normocephalic, atraumatic, anicteric sclera   Neck Normal ROM, No thyromegally   Lungs:  Clear to auscultation bilaterally, No wheezes or rubs   Cardiac: Regular rate and rhythm with no murmurs. Abd: Bowel sounds were audible. No tenderness on palpation   Ext: No pedal edema   Skin: Supple no rash     NeurologicExam:  Mental Status: Alert and oriented to person place and time   Speech: Fluent no aphasia or dysarthria. Cranial Nerves:  II - XII Intact with chronic bilateral ptosis   Motor:  Full and symmetric strength of upper and lower proximal and distal muscles. Normal bulk and tone. Reflexes:   Deep tendon reflexes 2+/4 and symmetric upper extremities  Lower extremities 3+ with 4 beats of clonus (achilles)   Sensory:   Symmetric and intact with no perceived deficits modalities involving small or large fibers. Gait:  Gait is wide based and ataxic   Tremor:   No tremor noted. Cerebellar:  Coordination intact. Neurovascular: No carotid bruits. No JVD       Imaging  MRI Results (most recent):  No results found for this or any previous visit. CT Results (most recent):  Results from Hospital Encounter encounter on 11/28/18   CT ABD W WO CONT    Narrative INDICATION: Status post resection of right renal cancer. Partial nephrectomy. Follow-up examination    COMPARISON: 6/7/2018    TECHNIQUE:   Following the uneventful intravenous administration of 100 cc Isovue-370, thin  axial images were obtained through the abdomen. Coronal and sagittal  reconstructions were generated. Oral contrast was not administered. CT dose  reduction was achieved through use of a standardized protocol tailored for this  examination and automatic exposure control for dose modulation.  Unenhanced,  arterial, nephrogram and delayed phase images were obtained    FINDINGS:   LUNG BASES: Clear. INCIDENTALLY IMAGED HEART AND MEDIASTINUM: Unremarkable. LIVER: No mass or biliary dilatation. GALLBLADDER: Unremarkable. SPLEEN: No mass. PANCREAS: No mass or ductal dilatation. ADRENALS: Unremarkable. KIDNEYS:     Mass lesion lower pole right kidney is surgically absent. There is subtle soft  tissue thickening in the surgical bed. A recurrent mass lesion is not  identified. This will serve as a baseline. No residual enhancing lesions within  the right kidney stone or hydronephrosis    Within the left kidney there is a 10 mm hypodensity in the lower pole anteriorly  is too small to characterize but unchanged. No left-sided stone or  hydronephrosis    BOWEL: No dilatation or wall thickening. PERITONEUM: No ascites or pneumoperitoneum. RETROPERITONEUM: No lymphadenopathy or aortic aneurysm. There are vascular  calcifications. BONES: No destructive bone lesion. ADDITIONAL COMMENTS: There is diastases of the rectus muscles. Question previous  mesh hernia repair      Impression IMPRESSION:  Status post resection of mass lesion lower pole right kidney.  No evidence of  local recurrence or metastatic disease                        Lab Review  Lab Results   Component Value Date/Time    WBC 6.9 04/08/2019 11:49 AM    HCT 44.4 04/08/2019 11:49 AM    HGB 14.6 04/08/2019 11:49 AM    PLATELET 267 08/04/1691 11:49 AM     Lab Results   Component Value Date/Time    Sodium 146 (H) 04/08/2019 11:49 AM    Potassium 4.4 04/08/2019 11:49 AM    Chloride 104 04/08/2019 11:49 AM    CO2 26 04/08/2019 11:49 AM    Glucose 95 04/08/2019 11:49 AM    BUN 9 04/08/2019 11:49 AM    Creatinine 0.85 04/08/2019 11:49 AM    Calcium 9.5 04/08/2019 11:49 AM     Lab Results   Component Value Date/Time    Vitamin B12 371 11/21/2018 12:26 PM    Folate 9.3 11/21/2018 12:26 PM     Lab Results   Component Value Date/Time    LDL, calculated 114 (H) 02/11/2019 09:21 AM Lab Results   Component Value Date/Time    Hemoglobin A1c 5.8 (H) 02/11/2019 09:21 AM    Hemoglobin A1c (POC) 5.5 12/05/2017 10:22 AM

## 2019-05-24 ENCOUNTER — HOSPITAL ENCOUNTER (OUTPATIENT)
Dept: MRI IMAGING | Age: 59
Discharge: HOME | End: 2019-05-24
Attending: PSYCHIATRY & NEUROLOGY
Payer: COMMERCIAL

## 2019-05-24 DIAGNOSIS — R29.2 HYPERREFLEXIA: ICD-10-CM

## 2019-05-24 DIAGNOSIS — R27.0 ATAXIA: ICD-10-CM

## 2019-05-24 PROCEDURE — 72141 MRI NECK SPINE W/O DYE: CPT

## 2019-05-30 LAB
ALBUMIN SERPL ELPH-MCNC: 3.6 G/DL (ref 2.9–4.4)
ALBUMIN/GLOB SERPL: 1.1 {RATIO} (ref 0.7–1.7)
ALPHA1 GLOB SERPL ELPH-MCNC: 0.2 G/DL (ref 0–0.4)
ALPHA2 GLOB SERPL ELPH-MCNC: 0.9 G/DL (ref 0.4–1)
B BURGDOR IGG+IGM SER-ACNC: <0.91 ISR (ref 0–0.9)
B-GLOBULIN SERPL ELPH-MCNC: 1.1 G/DL (ref 0.7–1.3)
GAMMA GLOB SERPL ELPH-MCNC: 1.1 G/DL (ref 0.4–1.8)
GLOBULIN SER CALC-MCNC: 3.3 G/DL (ref 2.2–3.9)
M PROTEIN SERPL ELPH-MCNC: NORMAL G/DL
PLEASE NOTE, 011150: NORMAL
PROT SERPL-MCNC: 6.9 G/DL (ref 6–8.5)
RPR SER QL: NON REACTIVE
VIT B12 SERPL-MCNC: 473 PG/ML (ref 232–1245)

## 2019-06-03 ENCOUNTER — OFFICE VISIT (OUTPATIENT)
Dept: NEUROLOGY | Age: 59
End: 2019-06-03

## 2019-06-03 VITALS — SYSTOLIC BLOOD PRESSURE: 121 MMHG | OXYGEN SATURATION: 98 % | DIASTOLIC BLOOD PRESSURE: 73 MMHG | HEART RATE: 80 BPM

## 2019-06-03 DIAGNOSIS — M54.16 LUMBAR RADICULOPATHY: Primary | ICD-10-CM

## 2019-06-03 NOTE — PROCEDURES
[facility] at [address] [facility] [address], [address]  Tel [phone]     Fax [phone]  Electrodiagnostic Study Report  Test Date:  6/3/2019    Patient: Devendra Aguilar : 1960 Physician: Maryalice Fabry   Sex: Male  < Ref Phys:      History   62year old right handed male with progressive leg weakness ongoing for a year. History of right kidney resection. EMG & NCV Findings:  Evaluation of the Left Sup Fibular sensory and the Right Sup Fibular sensory nerves showed decreased conduction velocity (Lower leg-Lat ankle, L38, R37 m/s). All remaining nerves (as indicated in the following tables) were within normal limits. EMG evaluation using disposable needle electrodes showed normal activity with the exception of the right upper lumbar paraspinals which are showed electrical instability. 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 This can be seen commonly with leg crossing for prolonged pressure at this location. This study is also suggestive of a right upper lumbar radiculopathy. Clinical correlation is advised.     __________________  Rickie Jiménez M.D.    Nerve Conduction Studies  Anti Sensory Summary Table     Site NR Peak (ms) Norm Peak (ms) P-T Amp (µV) Norm O-P Amp Site1 Site2 Dist (cm)   Right Saphenous Anti Sensory (Medial Ankle)  31.9°C   Medial Calf    3.3 <4.4 12.6 >4 Medial Calf Medial Ankle 0.0   Left Sup Fibular Anti Sensory (Lat ankle)  31.7°C   Lower leg    3.2 <4.5 14.0 >5 Lower leg Lat ankle 10.0   Right Sup Fibular Anti Sensory (Lat ankle)  31.8°C   Lower leg    3.3 <4.5 14.1 >5 Lower leg Lat ankle 10.0   Left Sural Anti Sensory (Lat Mall)  31.8°C   Calf    3.5 <4.5 6.0 >4.0 Calf Lat Mall 14.0   Right Sural Anti Sensory (Lat Mall)  31.9°C   Calf    3.7 <4.5 15.2 >4.0 Calf Lat Mall 14.0     Motor Summary Table     Site NR Onset (ms) Norm Onset (ms) O-P Amp (mV) Norm O-P Amp P-T Amp (mV) Site1 Site2 Dist (cm) Lb (m/s)   Left Fibular Motor (Ext Dig Brev)  31.8°C   Ankle    5.3 <6.5 5.7 >2.6 9.8 Ankle Ext Dig Brev 8.0    B Fib    12.5  3.6  6.4 B Fib Ankle 33.0 46   Poplt    14.1  3.7  6.1 Poplt B Fib 10.0 63   Right Fibular Motor (Ext Dig Brev)  31.4°C   Ankle    6.1 <6.5 3.3 >2.6 6.6 Ankle Ext Dig Brev 8.0    B Fib    13.2  4.3  7.9 B Fib Ankle 33.0 46   Poplt    15.1  3.2  6.7 Poplt B Fib 10.0 53   Left Tibial Motor (Abd Chandra Brev)  31.8°C   Ankle    5.0 <6.1 7.7 >5.3 16.5 Ankle Abd Chandra Brev 8.0    Knee    13.1  6.8  13.7 Knee Ankle 34.0 42   Right Tibial Motor (Abd Chandra Brev)  31.5°C   Ankle    4.8 <6.1 9.2 >5.3 20.2 Ankle Abd Chandra Brev 8.0    Knee    13.8  7.6  15.6 Knee Ankle 36.0 40     EMG     Side Muscle Nerve Root Ins Act Fibs Psw Recrt Duration Amp Poly Comment   Left Ext Dig Brev Dp Br Peron L5, S1 Nml Nml Nml Nml Nml Nml Nml    Left AbdHallucis MedPlantar S1-2 Nml Nml Nml Nml Nml Nml Nml    Left AntTibialis Dp Br Peron L4-5 Nml Nml Nml Nml Nml Nml Nml    Left BicepsFemL Sciatic L5-S2 Nml Nml Nml Nml Nml Nml Nml    Left Mid Lumb Parasp Rami L4,5 Nml Nml Nml Nml Nml Nml Nml    Left Lower Lumb Parasp Rami L5,S1 Nml Nml Nml Nml Nml Nml Nml    Right Ext Dig Brev Dp Br Peron L5, S1 Nml Nml Nml Nml Nml Nml Nml    Right AbdHallucis MedPlantar S1-2 Nml Nml Nml Nml Nml Nml Nml    Right AntTibialis Dp Br Peron L4-5 Nml Nml Nml Nml Nml Nml Nml    Right BicepsFemL Sciatic L5-S2 Nml Nml Nml Nml Nml Nml Nml    Right Mid Lumb Parasp Rami L4,5 Nml Nml Nml Nml Nml Nml Nml    Right Upper Lumb Parasp Rami L3,4 Nml Nml Nml Nml Nml Incr 2+    Right Abd Poll Brev Median C8-T1 Nml Nml Nml Nml Nml Nml Nml    Right ABD Dig Min Ulnar C8-T1 Nml Nml Nml Nml Nml Nml Nml    Right 1stDorInt Ulnar C8-T1 Nml Nml Nml Nml Nml Nml Nml    Right FlexCarRad Median C6-7 Nml Nml Nml Nml Nml Nml Nml    Right FlexCarpiUln Ulnar C8,T1 Nml Nml Nml Nml Nml Nml Nml    Right ExtDigCom   Nml Nml Nml Nml Nml Nml Nml    Right Biceps Musculocut C5-6 Nml Nml Nml Nml Nml Nml Nml    Right Triceps Radial C6-7-8 Nml Nml Nml Nml Nml Nml Nml    Right Deltoid Axillary C5-6 Nml Nml Nml Nml Nml Nml Nml    Right Lower Lumb Parasp Rami L5,S1 Nml Nml Nml Nml Nml Nml Nml            Waveforms:

## 2020-07-09 LAB — PSA, EXTERNAL: 0.56

## 2020-12-14 ENCOUNTER — OFFICE VISIT (OUTPATIENT)
Dept: FAMILY MEDICINE CLINIC | Age: 60
End: 2020-12-14
Payer: COMMERCIAL

## 2020-12-14 VITALS
SYSTOLIC BLOOD PRESSURE: 128 MMHG | RESPIRATION RATE: 18 BRPM | HEART RATE: 79 BPM | DIASTOLIC BLOOD PRESSURE: 82 MMHG | OXYGEN SATURATION: 96 % | TEMPERATURE: 97.4 F | BODY MASS INDEX: 39.68 KG/M2 | HEIGHT: 68 IN | WEIGHT: 261.8 LBS

## 2020-12-14 DIAGNOSIS — C64.9 PAPILLARY RENAL CELL CARCINOMA (HCC): ICD-10-CM

## 2020-12-14 DIAGNOSIS — F17.210 CIGARETTE SMOKER: ICD-10-CM

## 2020-12-14 DIAGNOSIS — R73.03 PREDIABETES: ICD-10-CM

## 2020-12-14 DIAGNOSIS — M25.561 CHRONIC PAIN OF RIGHT KNEE: ICD-10-CM

## 2020-12-14 DIAGNOSIS — E55.9 VITAMIN D DEFICIENCY: ICD-10-CM

## 2020-12-14 DIAGNOSIS — G89.29 CHRONIC PAIN OF RIGHT KNEE: ICD-10-CM

## 2020-12-14 DIAGNOSIS — I10 ESSENTIAL HYPERTENSION: ICD-10-CM

## 2020-12-14 DIAGNOSIS — M35.00 SICCA SYNDROME (HCC): ICD-10-CM

## 2020-12-14 DIAGNOSIS — F41.8 ANXIETY WITH DEPRESSION: Primary | ICD-10-CM

## 2020-12-14 DIAGNOSIS — E78.00 HYPERCHOLESTEREMIA: ICD-10-CM

## 2020-12-14 DIAGNOSIS — I25.10 CORONARY ARTERY DISEASE INVOLVING NATIVE HEART WITHOUT ANGINA PECTORIS, UNSPECIFIED VESSEL OR LESION TYPE: ICD-10-CM

## 2020-12-14 PROBLEM — E66.01 SEVERE OBESITY (HCC): Status: ACTIVE | Noted: 2020-12-14

## 2020-12-14 PROCEDURE — 99214 OFFICE O/P EST MOD 30 MIN: CPT | Performed by: PHYSICIAN ASSISTANT

## 2020-12-14 RX ORDER — ESCITALOPRAM OXALATE 20 MG/1
20 TABLET ORAL DAILY
Qty: 90 TAB | Refills: 1 | Status: SHIPPED | OUTPATIENT
Start: 2020-12-14 | End: 2021-06-04

## 2020-12-14 NOTE — PROGRESS NOTES
HPI:  61 y.o.  presents for follow up appointment. No hospital, ER or specialist visits since last primary care visit except as noted below. Last visit 4/08/2019    Anxiety with depression - started lexapro 10 mg at last visit (had previous benefit with it before), still feels slightly depressed in the mornings which does not seem to be worsened by the COVID pandemic, noticed his early morning anxiety got a lot better once he started the lexapro, no suicidal or homicidal ideations    CAD - s/p NSTEMI 6/19/2018, s/p PCI CX Marginal and RCA  on 6/21/2018, followed at [address] [place] [address] by Dr. Dionicio Park, on lipitor, metoprolol, plavix, aspirin, lasix.    -reports cards did not check his lipids and asked him to have it checked here    Right knee pain - gets a burning pain when driving or sitting in his recliner on lateral side of his knee, but feels fine when walking or sitting up in chair, no injury    Dry eyes - was seen at [facility] in the past, asks about Xiidra    Prediabetes - A1C 5.8% on 2/11/2019, not good at watching his prediabetic diet right now    Vit D deficiency - not taking suppl currently, states he never took it when advised last in 2017 by Dr Tanja Nixon    Renal cell carcinoma - sees nephrology q6-12 mos    Lab Results   Component Value Date/Time    VITAMIN D, 25-HYDROXY 20.3 (L) 12/05/2017 11:27 AM           Patient Active Problem List    Diagnosis    Severe obesity ([address]    Kidney carcinoma ([address]    Unstable angina ([address]    Acquired ptosis of right eyelid    Blepharitis of eyelid of left eye    Blepharitis of eyelid of right eye    Cataract, nuclear sclerotic, both eyes    Papillary renal cell carcinoma ([address] based on CT result 6/2018    Non compliance with medical treatment    Vitamin D deficiency    Cigarette smoker    Anxiety    Pure hypercholesterolemia    CAD (coronary artery disease)    Essential hypertension         Past Medical History:   Diagnosis Date    Anxiety     CAD (coronary artery disease) PTCA dr Amy Lock Rebeccaer   2/19/16 note/ekg    CAD (coronary artery disease)     PTCA Dr. Micheal Perez 06/2018    CHF (congestive heart failure) ([facility] [address] 2/2016    well compenstated per notes    Chronic kidney disease     mass on right kidney, diagnosed as cancer     Diverticulitis     with colon resection    Finger pain, right     Index finger- started 2 years- Painful if hit.  History of chicken pox     Hypercholesteremia     Hypertension     Insomnia     Kidney carcinoma ([facility] 07/2018    right, partial nephrectomy 8/2018, Dr. Tonja Toure, no chemo    Knee pain 2009    ortho for cortisone injection    [facility] on CT result 6/2018 6/8/2018    Prediabetes     Screening [place] level 02/12/15    [place] [facility] [place] [place] [facility]  and again 2/2016    Vitamin D deficiency     again 8/2014 again 12/2016       Social History     Tobacco Use    Smoking status: Current Every Day Smoker     Packs/day: 0.25     Types: Cigarettes    Smokeless tobacco: Never Used   Substance Use Topics    Alcohol use: Yes     Comment: rare social    Drug use: No       Outpatient Medications Marked as Taking for the 12/14/20 encounter (Office Visit) with Caitlin Stiles PA-C   Medication Sig Dispense Refill    escitalopram oxalate (LEXAPRO) 10 mg tablet Take 1 Tab by mouth daily. Office visit/labs due before next refill 30 Tab 0    busPIRone (BUSPAR) 10 mg tablet Take 0.5-1 Tabs by mouth every eight (8) hours as needed. 90 Tab 1    atorvastatin (LIPITOR) 80 mg tablet Take 1 Tab by mouth daily. 30 Tab 6    metoprolol tartrate (LOPRESSOR) 50 mg tablet Take 0.5 Tabs by mouth two (2) times a day. (Patient taking differently: Take 25 mg by mouth daily.) 30 Tab 0    clopidogrel (PLAVIX) 75 mg tab Take 1 Tab by mouth daily. 45 Tab 0    aspirin (ASPIRIN) 325 mg tablet Take 1 Tab by mouth daily. 30 Tab 6    furosemide (LASIX) 20 mg tablet Take  by mouth daily.          No Known Allergies    ROS:  ROS negative except as per HPI. PE:  Visit Vitals  /82 (BP 1 Location: Right arm, BP Patient Position: Sitting)   Pulse 79   Temp 97.4 °F (36.3 °C)   Resp 18   Ht 5' 8\" (1.727 m)   Wt 261 lb 12.8 oz (118.8 kg)   SpO2 96%   BMI 39.81 kg/m²     Gen: alert, oriented, no acute distress  Head: normocephalic, atraumatic  Eyes: pupils equal round reactive to light, sclera clear, conjunctiva clear  Oral: mask on  Neck: supple  Resp: no increase work of breathing, lungs clear to ausculation bilaterally, no wheezing, rales or rhonchi  CV: S1, S2 normal.  No murmurs, rubs, or gallops. Neuro: normal strength in BLE; SLR negative bilaterally, no tenderness or swelling of right knee  Skin: no lesion or rash  Extremities: no cyanosis or edema    No results found for this visit on 12/14/20. Lab Results   Component Value Date/Time    Cholesterol, total 175 02/11/2019 09:21 AM    HDL Cholesterol 37 (L) 02/11/2019 09:21 AM    LDL, calculated 114 (H) 02/11/2019 09:21 AM    VLDL, calculated 24 02/11/2019 09:21 AM    Triglyceride 120 02/11/2019 09:21 AM    CHOL/HDL Ratio 4.9 04/26/2010 10:29 AM     Lab Results   Component Value Date/Time    WBC 6.9 04/08/2019 11:49 AM    HGB 14.6 04/08/2019 11:49 AM    HCT 44.4 04/08/2019 11:49 AM    PLATELET 341 74/53/1639 11:49 AM    MCV 87 04/08/2019 11:49 AM     Lab Results   Component Value Date/Time    Sodium 146 (H) 04/08/2019 11:49 AM    Potassium 4.4 04/08/2019 11:49 AM    Chloride 104 04/08/2019 11:49 AM    CO2 26 04/08/2019 11:49 AM    Anion gap 11 06/22/2018 03:41 AM    Glucose 95 04/08/2019 11:49 AM    BUN 9 04/08/2019 11:49 AM    Creatinine 0.85 04/08/2019 11:49 AM    BUN/Creatinine ratio 11 04/08/2019 11:49 AM    GFR est  04/08/2019 11:49 AM    GFR est non-AA 96 04/08/2019 11:49 AM    Calcium 9.5 04/08/2019 11:49 AM    Bilirubin, total 0.4 04/08/2019 11:49 AM    Alk.  phosphatase 112 04/08/2019 11:49 AM    Protein, total 6.9 05/24/2019 09:03 AM    Albumin 4.4 04/08/2019 11:49 AM    Globulin 3.6 06/19/2018 10:30 PM    A-G Ratio 1.6 04/08/2019 11:49 AM    ALT (SGPT) 21 04/08/2019 11:49 AM    AST (SGOT) 16 04/08/2019 11:49 AM     Lab Results   Component Value Date/Time    VITAMIN D, 25-HYDROXY 20.3 (L) 12/05/2017 11:27 AM           Assessment/Plan:    1. Anxiety with depression  Anxiety is resolved on lexapro 10 mg (did not take the buspar) but still feeling depressed  No suicidal ideations  Increase to lexapro 20 mg  F/u 3 mos  - escitalopram oxalate (LEXAPRO) 20 mg tablet; Take 1 Tab by mouth daily. Dispense: 90 Tab; Refill: 1    2. Sicca syndrome (HCC)  Needs to follow up with ophthalmology/[facility] for prescription eye drop and monitoring    3. Papillary renal cell carcinoma (HCC)  Followed by Dr Mandy Lerner    4. Coronary artery disease involving native heart without angina pectoris, unspecified vessel or lesion type  /p NSTEMI 6/19/2018, s/p PCI CX Marginal and RCA  on 6/21/2018, followed at [address] [place] [address] by Dr. Ricky Clement, on lipitor, metoprolol, plavix, aspirin, lasix. -needs lipids checked  - in 2/2019, goal <70  - LIPID PANEL; Future    5. Essential hypertension  Controlled  Monitored by cards as well  Continue current regimen (metoprolol and lasix)  - METABOLIC PANEL, COMPREHENSIVE; Future  - CBC WITH AUTOMATED DIFF; Future  - TSH 3RD GENERATION; Future    6. Prediabetes   A1C 5.8% on 2/11/2019, not good at watching his prediabetic diet right now  -reviewed diet  - HEMOGLOBIN A1C WITH EAG; Future    7. Vitamin D deficiency  Last level 20.3 in 2017  Currently not on suppl  - VITAMIN D, 25 HYDROXY; Future    8. Cigarette smoker  Reviewed strategies and encouraged cessation    9. Hypercholesteremia  CAD, goal <70, see above  - LIPID PANEL; Future    10. Chronic pain of right knee  Intermittent burning, not reproduced today  Consider sciatica variant  Use OTC topical diclofenac prn, f/u with ortho if not improving       Health Maintenance reviewed - updated.     Orders Placed This Encounter    METABOLIC PANEL, COMPREHENSIVE     Standing Status:   Future     Standing Expiration Date:   12/14/2021    CBC WITH AUTOMATED DIFF     Standing Status:   Future     Standing Expiration Date:   12/14/2021    LIPID PANEL     Standing Status:   Future     Standing Expiration Date:   12/14/2021    TSH 3RD GENERATION     Standing Status:   Future     Standing Expiration Date:   12/14/2021    VITAMIN D, 25 HYDROXY     Standing Status:   Future     Standing Expiration Date:   12/14/2021    HEMOGLOBIN A1C WITH EAG     Standing Status:   Future     Standing Expiration Date:   12/14/2021    escitalopram oxalate (LEXAPRO) 20 mg tablet     Sig: Take 1 Tab by mouth daily. Dispense:  90 Tab     Refill:  1       Medications Discontinued During This Encounter   Medication Reason    busPIRone (BUSPAR) 10 mg tablet Not A Current Medication    tamsulosin (FLOMAX) 0.4 mg capsule Not A Current Medication    escitalopram oxalate (LEXAPRO) 10 mg tablet REORDER       Current Outpatient Medications   Medication Sig Dispense Refill    escitalopram oxalate (LEXAPRO) 10 mg tablet Take 1 Tab by mouth daily. Office visit/labs due before next refill 30 Tab 0    busPIRone (BUSPAR) 10 mg tablet Take 0.5-1 Tabs by mouth every eight (8) hours as needed. 90 Tab 1    atorvastatin (LIPITOR) 80 mg tablet Take 1 Tab by mouth daily. 30 Tab 6    metoprolol tartrate (LOPRESSOR) 50 mg tablet Take 0.5 Tabs by mouth two (2) times a day. (Patient taking differently: Take 25 mg by mouth daily.) 30 Tab 0    clopidogrel (PLAVIX) 75 mg tab Take 1 Tab by mouth daily. 45 Tab 0    aspirin (ASPIRIN) 325 mg tablet Take 1 Tab by mouth daily. 30 Tab 6    furosemide (LASIX) 20 mg tablet Take  by mouth daily.  tamsulosin (FLOMAX) 0.4 mg capsule TAKE 1 CAPSULE BY MOUTH EVERY DAY IN THE EVENING  3       Recommended healthy diet low in carbohydrates, fats, sodium and cholesterol.   Recommended regular cardiovascular exercise 3-6 times per week for 30-60 minutes daily. Verbal and written instructions (see AVS) provided. Patient expresses understanding of diagnosis and treatment plan. Follow-up and Dispositions    · Return in about 3 months (around 3/14/2021) for depression; 2nd appt for fasting labs soon.        Future Appointments   Date Time Provider Jose Mcguire   1/12/2021 10:00 AM LAB [facility] BS AMB   3/17/2021 10:30 AM Caitlin Muñiz, PA-C JENNIFER BS AMB

## 2020-12-14 NOTE — PROGRESS NOTES
Chief Complaint   Patient presents with    Medication Refill     Pt states he received a message stating he needed an appointment for his refills. Pt would like a prescription for Xiidra for dry eyes. 1. Have you been to the ER, urgent care clinic since your last visit? Hospitalized since your last visit? No    2. Have you seen or consulted any other health care providers outside of the [address] [place] [address] since your last visit? Include any pap smears or colon screening.  No    Visit Vitals  BP (!) 144/89 (BP 1 Location: Left arm, BP Patient Position: Sitting)   Pulse 79   Temp 97.4 °F (36.3 °C)   Resp 18   Ht 5' 8\" (1.727 m)   Wt 261 lb 12.8 oz (118.8 kg)   SpO2 96%   BMI 39.81 kg/m²

## 2020-12-14 NOTE — PATIENT INSTRUCTIONS
Prediabetes: Care Instructions Overview Prediabetes is a warning sign that you're at risk for getting type 2 diabetes. It means that your blood sugar is higher than it should be. But it's not high enough to be diabetes. The food you eat naturally turns into sugar. Your body uses the sugar for energy. Normally, an organ called the pancreas makes insulin. And insulin allows the sugar in your blood to get into your body's cells. But sometimes the body can't use insulin the right way. So the sugar stays in your blood instead. This is called insulin resistance. The buildup of sugar in your blood means you have prediabetes. The good news is that you may be able to prevent or delay diabetes. Making small lifestyle changes, like getting active and changing your eating habits, may help you get your blood sugar back to normal. You can work with your doctor to make a treatment plan. Follow-up care is a key part of your treatment and safety. Be sure to make and go to all appointments, and call your doctor if you are having problems. It's also a good idea to know your test results and keep a list of the medicines you take. How can you care for yourself at home? · Watch your weight. A healthy weight helps your body use insulin properly. · Limit the amount of calories, sweets, and unhealthy fat you eat. Ask your doctor if you should see a dietitian. A registered dietitian can help you create meal plans that fit your lifestyle. · Get at least 30 minutes of exercise on most days of the week. Exercise helps control your blood sugar. It also helps you maintain a healthy weight. Walking is a good choice. You also may want to do other activities, such as running, swimming, cycling, or playing tennis or team sports. · Do not smoke. Smoking can make prediabetes worse. If you need help quitting, talk to your doctor about stop-smoking programs and medicines. These can increase your chances of quitting for good. · If your doctor prescribed medicines, take them exactly as prescribed. Call your doctor if you think you are having a problem with your medicine. You will get more details on the specific medicines your doctor prescribes. When should you call for help? Watch closely for changes in your health, and be sure to contact your doctor if: 
  · You have any symptoms of diabetes. These may include: 
? Being thirsty more often. ? Urinating more. ? Being hungrier. ? Losing weight. ? Being very tired. ? Having blurry vision.  
  · You have a wound that will not heal.  
  · You have an infection that will not go away.  
  · You have problems with your blood pressure.  
  · You want more information about diabetes and how you can keep from getting it. Where can you learn more? Go to http://sabrina-umesh.info/ Enter I222 in the search box to learn more about \"Prediabetes: Care Instructions. \" Current as of: December 20, 2019               Content Version: 12.6 © 3312-8223 [employer]. Care instructions adapted under license by [employer] (which disclaims liability or warranty for this information). If you have questions about a medical condition or this instruction, always ask your healthcare professional. [address] any warranty or liability for your use of this information. Use topical diclofenac to the knee as needed 4 times a day for discomfort.

## 2021-07-12 LAB — CREATININE, EXTERNAL: 0.92

## 2021-08-04 ENCOUNTER — DOCUMENTATION ONLY (OUTPATIENT)
Dept: INTERNAL MEDICINE CLINIC | Age: 61
End: 2021-08-04

## 2021-08-04 NOTE — PROGRESS NOTES
I have called the patient back multiple times. I either get told the number is disconnected or the mailbox is full. When he calls I am either on the other line or unable to answer his call.

## 2021-08-16 ENCOUNTER — OFFICE VISIT (OUTPATIENT)
Dept: INTERNAL MEDICINE CLINIC | Age: 61
End: 2021-08-16
Payer: COMMERCIAL

## 2021-08-16 VITALS
SYSTOLIC BLOOD PRESSURE: 134 MMHG | DIASTOLIC BLOOD PRESSURE: 82 MMHG | HEART RATE: 78 BPM | RESPIRATION RATE: 18 BRPM | OXYGEN SATURATION: 96 % | WEIGHT: 266 LBS | BODY MASS INDEX: 40.32 KG/M2 | TEMPERATURE: 98 F | HEIGHT: 68 IN

## 2021-08-16 DIAGNOSIS — E78.00 PURE HYPERCHOLESTEROLEMIA: ICD-10-CM

## 2021-08-16 DIAGNOSIS — R73.03 PREDIABETES: ICD-10-CM

## 2021-08-16 DIAGNOSIS — R06.83 SNORING: ICD-10-CM

## 2021-08-16 DIAGNOSIS — L30.8 PSORIASIFORM ERUPTION: ICD-10-CM

## 2021-08-16 DIAGNOSIS — R53.83 FATIGUE, UNSPECIFIED TYPE: ICD-10-CM

## 2021-08-16 DIAGNOSIS — I10 ESSENTIAL HYPERTENSION: ICD-10-CM

## 2021-08-16 DIAGNOSIS — I25.10 CORONARY ARTERY DISEASE INVOLVING NATIVE HEART WITHOUT ANGINA PECTORIS, UNSPECIFIED VESSEL OR LESION TYPE: ICD-10-CM

## 2021-08-16 DIAGNOSIS — F17.210 CIGARETTE SMOKER: ICD-10-CM

## 2021-08-16 DIAGNOSIS — C64.9 CARCINOMA OF KIDNEY, UNSPECIFIED LATERALITY (HCC): ICD-10-CM

## 2021-08-16 DIAGNOSIS — E55.9 VITAMIN D DEFICIENCY: ICD-10-CM

## 2021-08-16 DIAGNOSIS — J34.89 NASAL SORE: ICD-10-CM

## 2021-08-16 DIAGNOSIS — F41.8 ANXIETY WITH DEPRESSION: Primary | ICD-10-CM

## 2021-08-16 PROCEDURE — 99214 OFFICE O/P EST MOD 30 MIN: CPT | Performed by: PHYSICIAN ASSISTANT

## 2021-08-16 RX ORDER — ESCITALOPRAM OXALATE 20 MG/1
TABLET ORAL
Qty: 90 TABLET | Refills: 1 | Status: SHIPPED | OUTPATIENT
Start: 2021-08-16 | End: 2022-02-07

## 2021-08-16 RX ORDER — MUPIROCIN 20 MG/G
OINTMENT TOPICAL 2 TIMES DAILY
Qty: 15 G | Refills: 0 | Status: SHIPPED | OUTPATIENT
Start: 2021-08-16 | End: 2021-08-21

## 2021-08-16 RX ORDER — TRIAMCINOLONE ACETONIDE 5 MG/G
OINTMENT TOPICAL 2 TIMES DAILY
Qty: 30 G | Refills: 0 | Status: SHIPPED | OUTPATIENT
Start: 2021-08-16 | End: 2021-08-30

## 2021-08-16 NOTE — PATIENT INSTRUCTIONS

## 2021-08-16 NOTE — PROGRESS NOTES
HPI:  61 y.o.  presents for follow up appointment. No hospital, ER or specialist visits since last primary care visit except as noted below. Last visit 12/14/2020    CAD - s/p NSTEMI 6/19/2018, s/p PCI CX Marginal and RCA  on 6/21/2018, followed at [address] [place] [address] by Dr. Adama Ogden, on lipitor, metoprolol, plavix, aspirin, lasix.    -reports cards did not check his lipids and asked him to have it checked here    Hyperlipidemia - on atorvastatin 80 mg, takes it daily  Last   Lab Results   Component Value Date/Time    Cholesterol, total 184 01/20/2021 09:23 AM    HDL Cholesterol 36 01/20/2021 09:23 AM    LDL, calculated 114.4 (H) 01/20/2021 09:23 AM    VLDL, calculated 33.6 01/20/2021 09:23 AM    Triglyceride 168 (H) 01/20/2021 09:23 AM    CHOL/HDL Ratio 5.1 (H) 01/20/2021 09:23 AM       Anxiety with depression - started lexapro 10 mg 4/2019, increased to 20 mg 12/2020, which he reports he is feeling great and would like to continue current dose    Fatigue   Reports sleeps 6-8 hours at night, but does not feel refreshed  States his routine is to get up, smoke 2 cigarettes, and then takes 20 min nap, and again in mid-day around 3 pm  He snores and his wife complains about it    Sore on left nostril  Gets crusty  Applies vaseline with benefit but keeps recurring    Left shin rash  Noted for at least 2 years  psoriaform looking, itches  Uses lotion on it  No trauma  States his sister has psoriasis    Tobacco use  Plans to attend hypnosis clinic  Still smoking over 1 ppd x 40 yrs    Renal cell carcinoma - sees nephrology Dr Marc Houston q6-12 mos  Sees oncology Dr Arturo Oakes     Prediabetes - most recent A1C 6.1% on 1/20/21, prior A1C 5.8% on 2/11/2019, not good at watching his prediabetic diet right now     Vit D deficiency - not taking suppl currently, states he never took it when advised last in 2017 by Dr Sena Pires  Lab Results   Component Value Date/Time    Vitamin D 25-Hydroxy 29.3 (L) 01/20/2021 09:23 AM Patient Active Problem List    Diagnosis    Severe obesity ([facility]    Kidney carcinoma (HCC)    Unstable angina (HCC)    Acquired ptosis of right eyelid    Blepharitis of eyelid of left eye    Blepharitis of eyelid of right eye    Cataract, nuclear sclerotic, both eyes    Papillary renal cell carcinoma ([facility] [address] [facility] based on CT result 6/2018    Non compliance with medical treatment    Vitamin D deficiency    Cigarette smoker    Anxiety    Pure hypercholesterolemia    CAD (coronary artery disease)    Essential hypertension         Past Medical History:   Diagnosis Date    Anxiety     CAD (coronary artery disease)     PTCA dr roger forte   2/19/16 note/ekg    CAD (coronary artery disease)     [facility] Dr. Aron Dela Cruz 06/2018    CHF (congestive heart failure) ([facility] 2/2016    well compenstated per notes    Chronic kidney disease     mass on right kidney, diagnosed as cancer     Diverticulitis     with colon resection    Finger pain, right     Index finger- started 2 years- Painful if hit.     History of chicken pox     Hypercholesteremia     Hypertension     Insomnia     Kidney carcinoma ([facility] [facility] 07/2018    right, partial nephrectomy 8/2018, Dr. Camelia Miller, no chemo    Knee pain 2009    ortho for cortisone injection    Papillary renal cell carcinoma ([facility] based on CT result 6/2018 6/8/2018    Prediabetes     Screening cholesterol level 02/12/15    from [facility]  and again 2/2016    Vitamin D deficiency     again 8/2014 again 12/2016       Social History     Tobacco Use    Smoking status: Current Every Day Smoker     Packs/day: 0.25     Years: 40.00     Pack years: 10.00     Types: Cigarettes    Smokeless tobacco: Never Used   Vaping Use    Vaping Use: Never used   Substance Use Topics    Alcohol use: Yes     Comment: rare social    Drug use: No       Outpatient Medications Marked as Taking for the 8/16/21 encounter (Office Visit) with Yony Soto PA-C   Medication Sig Dispense Refill  escitalopram oxalate (LEXAPRO) 20 mg tablet TAKE 1 TABLET BY MOUTH EVERY DAY 90 Tablet 0    atorvastatin (LIPITOR) 80 mg tablet Take 1 Tab by mouth daily. 30 Tab 6    metoprolol tartrate (LOPRESSOR) 50 mg tablet Take 0.5 Tabs by mouth two (2) times a day. (Patient taking differently: Take 25 mg by mouth daily.) 30 Tab 0    clopidogrel (PLAVIX) 75 mg tab Take 1 Tab by mouth daily. 45 Tab 0    aspirin (ASPIRIN) 325 mg tablet Take 1 Tab by mouth daily. 30 Tab 6    furosemide (LASIX) 20 mg tablet Take  by mouth daily. No Known Allergies    ROS:  ROS negative except as per HPI. PE:  Visit Vitals  /82 (BP 1 Location: Left arm, BP Patient Position: Sitting, BP Cuff Size: Adult)   Pulse 78   Temp 98 °F (36.7 °C) (Temporal)   Resp 18   Ht 5' 8\" (1.727 m)   Wt 266 lb (120.7 kg)   SpO2 96%   BMI 40.45 kg/m²     Gen: alert, oriented, no acute distress  Head: normocephalic, atraumatic  Eyes: pupils equal round reactive to light, sclera clear, conjunctiva clear  Nose: left anterior nares at base of nostril rim is mildly erythematous crack, no current crusting  Oral: moist mucus membranes, no oral lesions, no pharyngeal inflammation or exudate  Neck: symmetric normal sized thyroid, no carotid bruits, no lymphadenopathy  Resp: no increase work of breathing, lungs clear to ausculation bilaterally, no wheezing, rales or rhonchi  CV: S1, S2 normal.  No murmurs, rubs, or gallops. Abd: soft, not tender, not distended. . Normal bowel sounds. Neuro: grossly intact  Skin: anterior left shin a palm-sized area of an erythematous psoriaform plaque in a nummular formation with raised borders, punctate scabs from scratching; no lesions on knees, elbows, ears, or scalp  Extremities: no cyanosis or edema    No results found for this visit on 08/16/21. Assessment/Plan:    1.  Anxiety with depression  Controlled on lexapro 20 mg, continue current therapy    - escitalopram oxalate (LEXAPRO) 20 mg tablet; TAKE 1 TABLET BY MOUTH EVERY DAY  Dispense: 90 Tablet; Refill: 1    2. Coronary artery disease involving native heart without angina pectoris, unspecified vessel or lesion type  s/p NSTEMI 6/19/2018, s/p PCI CX Marginal and RCA  on 6/21/2018, followed at [address] [place] [address] by Dr. Silvana Cordon, on lipitor, metoprolol, plavix, aspirin, lasix. -reports cards did not check his lipids and asked him to have it checked here    - CBC WITH AUTOMATED DIFF; Future  - LIPID PANEL; Future    3. Pure hypercholesterolemia  on atorvastatin 80 mg, takes it daily  Last  on 1/2021  Goal LDL <70  If lipids not at goal with current check, will switch to rosuvastatin    - METABOLIC PANEL, COMPREHENSIVE; Future  - LIPID PANEL; Future    4. Essential hypertension  134/82, managed by cards Dr Silvana Cordon  On metoprolol    5. Carcinoma of kidney, unspecified laterality [facility])   sees nephrology Dr Laura Mojica q6-12 mos  Sees oncology Dr Andrés Dolan    6. Nasal sore  Cover for staph    - mupirocin (BACTROBAN) 2 % ointment; Apply  to affected area two (2) times a day for 5 days. For sore on nose  Dispense: 15 g; Refill: 0    7. Vitamin D deficiency  Last level 29.3  not taking suppl currently,    - VITAMIN D, 25 HYDROXY; Future    8. Cigarette smoker  He reports he will sign up for a hypnosis class to stop smoking    9. Fatigue, unspecified type  Unrestful sleep, reports snoring  Referred to sleep medicine to r/o BRETT    - SLEEP MEDICINE REFERRAL  - CBC WITH AUTOMATED DIFF; Future  - TSH 3RD GENERATION; Future  - FERRITIN; Future    10. Snoring    - SLEEP MEDICINE REFERRAL    11. Psoriasiform eruption  Steroid ointment to rash on left shin, no other lesions  His sister has psoriasis  - triamcinolone acetonide (KENALOG) 0.5 % ointment; Apply  to affected area two (2) times a day for 14 days. use thin layer to area on left leg  Dispense: 30 g; Refill: 0    12.  Prediabetes  most recent A1C 6.1% on 1/20/21, prior A1C 5.8% on 2/11/2019, not good at watching his prediabetic diet right now  Diet reviewed  Recommend nutritionist and metformin if A1C is still above 6%     - HEMOGLOBIN A1C WITH EAG; Future      Health Maintenance reviewed - updated. Orders Placed This Encounter    METABOLIC PANEL, COMPREHENSIVE     Standing Status:   Future     Standing Expiration Date:   8/16/2022    CBC WITH AUTOMATED DIFF     Standing Status:   Future     Standing Expiration Date:   8/16/2022    HEMOGLOBIN A1C WITH EAG     Standing Status:   Future     Standing Expiration Date:   8/16/2022    LIPID PANEL     Standing Status:   Future     Standing Expiration Date:   8/16/2022    TSH 3RD GENERATION     Standing Status:   Future     Standing Expiration Date:   8/16/2022    FERRITIN     Standing Status:   Future     Standing Expiration Date:   8/16/2022    VITAMIN D, 25 HYDROXY     Standing Status:   Future     Standing Expiration Date:   8/16/2022    [facility] [id number] [address] Sleep Medicine     Referral Priority:   Routine     Referral Type:   Consultation     Referral Reason:   Specialty Services Required     Referred to Provider:   Carlos Carter MD     Number of Visits Requested:   1    escitalopram oxalate (LEXAPRO) 20 mg tablet     Sig: TAKE 1 TABLET BY MOUTH EVERY DAY     Dispense:  90 Tablet     Refill:  1    mupirocin (BACTROBAN) 2 % ointment     Sig: Apply  to affected area two (2) times a day for 5 days. For sore on nose     Dispense:  15 g     Refill:  0    triamcinolone acetonide (KENALOG) 0.5 % ointment     Sig: Apply  to affected area two (2) times a day for 14 days.  use thin layer to area on left leg     Dispense:  30 g     Refill:  0       Medications Discontinued During This Encounter   Medication Reason    escitalopram oxalate (LEXAPRO) 20 mg tablet REORDER       Current Outpatient Medications   Medication Sig Dispense Refill    escitalopram oxalate (LEXAPRO) 20 mg tablet TAKE 1 TABLET BY MOUTH EVERY DAY 90 Tablet 1    mupirocin (BACTROBAN) 2 % ointment Apply  to affected area two (2) times a day for 5 days. For sore on nose 15 g 0    triamcinolone acetonide (KENALOG) 0.5 % ointment Apply  to affected area two (2) times a day for 14 days. use thin layer to area on left leg 30 g 0    atorvastatin (LIPITOR) 80 mg tablet Take 1 Tab by mouth daily. 30 Tab 6    metoprolol tartrate (LOPRESSOR) 50 mg tablet Take 0.5 Tabs by mouth two (2) times a day. (Patient taking differently: Take 25 mg by mouth daily.) 30 Tab 0    clopidogrel (PLAVIX) 75 mg tab Take 1 Tab by mouth daily. 45 Tab 0    aspirin (ASPIRIN) 325 mg tablet Take 1 Tab by mouth daily. 30 Tab 6    furosemide (LASIX) 20 mg tablet Take  by mouth daily. Recommended healthy diet low in carbohydrates, fats, sodium and cholesterol. Recommended regular cardiovascular exercise 3-6 times per week for 30-60 minutes daily. Verbal and written instructions (see AVS) provided. Patient expresses understanding of diagnosis and treatment plan. Follow-up and Dispositions    · Return in about 3 months (around 11/16/2021) for prediabetes; 2nd appt for fasting labs soon.        Future Appointments   Date Time Provider Jose Mcguire   8/23/2021 10:20 AM LAB ONLY [facility]   11/22/2021 11:00 AM Caitlin Muñiz PA-C [facility]

## 2021-08-16 NOTE — PROGRESS NOTES
Paulino Nettles is a 61 y.o. male     Chief Complaint   Patient presents with    Hypertension     follow up       Visit Vitals  /82 (BP 1 Location: Left arm, BP Patient Position: Sitting, BP Cuff Size: Adult)   Pulse 78   Temp 98 °F (36.7 °C) (Temporal)   Resp 18   Ht 5' 8\" (1.727 m)   Wt 266 lb (120.7 kg)   SpO2 96%   BMI 40.45 kg/m²       There are no preventive care reminders to display for this patient. 1. Have you been to the ER, urgent care clinic since your last visit? Hospitalized since your last visit? No    2. Have you seen or consulted any other health care providers outside of the [address] [place] [address] since your last visit? Include any pap smears or colon screening.  Saw Dr. Morris Douglass and Dr. Jose Luis LOMAS

## 2022-03-18 PROBLEM — H01.006 BLEPHARITIS OF EYELID OF LEFT EYE: Status: ACTIVE | Noted: 2018-06-11

## 2022-03-18 PROBLEM — E66.01 SEVERE OBESITY (HCC): Status: ACTIVE | Noted: 2020-12-14

## 2022-03-18 PROBLEM — C64.9 KIDNEY CARCINOMA (HCC): Status: ACTIVE | Noted: 2018-07-01

## 2022-03-19 PROBLEM — H25.13 CATARACT, NUCLEAR SCLEROTIC, BOTH EYES: Status: ACTIVE | Noted: 2018-06-11

## 2022-03-19 PROBLEM — C64.9 PAPILLARY RENAL CELL CARCINOMA (HCC): Status: ACTIVE | Noted: 2018-06-08

## 2022-03-19 PROBLEM — H02.401 ACQUIRED PTOSIS OF RIGHT EYELID: Status: ACTIVE | Noted: 2018-06-11

## 2022-03-19 PROBLEM — I20.0 UNSTABLE ANGINA (HCC): Status: ACTIVE | Noted: 2018-06-20

## 2022-03-20 PROBLEM — H01.003 BLEPHARITIS OF EYELID OF RIGHT EYE: Status: ACTIVE | Noted: 2018-06-11

## 2022-06-02 ENCOUNTER — OFFICE VISIT (OUTPATIENT)
Dept: INTERNAL MEDICINE CLINIC | Age: 62
End: 2022-06-02
Payer: COMMERCIAL

## 2022-06-02 VITALS
RESPIRATION RATE: 17 BRPM | OXYGEN SATURATION: 95 % | TEMPERATURE: 98.4 F | DIASTOLIC BLOOD PRESSURE: 70 MMHG | SYSTOLIC BLOOD PRESSURE: 122 MMHG | HEIGHT: 68 IN | BODY MASS INDEX: 38.75 KG/M2 | WEIGHT: 255.7 LBS | HEART RATE: 84 BPM

## 2022-06-02 DIAGNOSIS — I10 ESSENTIAL HYPERTENSION: ICD-10-CM

## 2022-06-02 DIAGNOSIS — K13.0 ANGULAR CHEILITIS: ICD-10-CM

## 2022-06-02 DIAGNOSIS — F17.210 CIGARETTE SMOKER: ICD-10-CM

## 2022-06-02 DIAGNOSIS — F41.8 ANXIETY WITH DEPRESSION: ICD-10-CM

## 2022-06-02 DIAGNOSIS — E55.9 VITAMIN D DEFICIENCY: ICD-10-CM

## 2022-06-02 DIAGNOSIS — I25.10 CORONARY ARTERY DISEASE INVOLVING NATIVE HEART WITHOUT ANGINA PECTORIS, UNSPECIFIED VESSEL OR LESION TYPE: ICD-10-CM

## 2022-06-02 DIAGNOSIS — E78.00 PURE HYPERCHOLESTEROLEMIA: ICD-10-CM

## 2022-06-02 DIAGNOSIS — L30.8 PSORIASIFORM ERUPTION: Primary | ICD-10-CM

## 2022-06-02 DIAGNOSIS — R73.03 PREDIABETES: ICD-10-CM

## 2022-06-02 PROCEDURE — 99214 OFFICE O/P EST MOD 30 MIN: CPT | Performed by: PHYSICIAN ASSISTANT

## 2022-06-02 RX ORDER — ESCITALOPRAM OXALATE 20 MG/1
TABLET ORAL
Qty: 90 TABLET | Refills: 1 | Status: SHIPPED | OUTPATIENT
Start: 2022-06-02

## 2022-06-02 RX ORDER — CLOBETASOL PROPIONATE 0.5 MG/G
OINTMENT TOPICAL 2 TIMES DAILY
Qty: 15 G | Refills: 0 | Status: SHIPPED | OUTPATIENT
Start: 2022-06-02 | End: 2022-09-08

## 2022-06-02 NOTE — PROGRESS NOTES
Chief Complaint   Patient presents with    Rash     rash on left leg that hasnt improved     Visit Vitals  BP (!) 146/90 (BP 1 Location: Left upper arm, BP Patient Position: Sitting, BP Cuff Size: Adult)   Pulse 84   Temp 98.4 °F (36.9 °C) (Oral)   Resp 17   Ht 5' 8\" (1.727 m)   Wt 255 lb 11.2 oz (116 kg)   SpO2 95%   BMI 38.88 kg/m²     1. Have you been to the ER, urgent care clinic since your last visit? Hospitalized since your last visit? No    2. Have you seen or consulted any other health care providers outside of the [address] [place] [address] since your last visit? Include any pap smears or colon screening.  No

## 2022-06-02 NOTE — PROGRESS NOTES
HPI:  64 y.o.  presents for follow up appointment. No hospital, ER or specialist visits since last primary care visit except as noted below.     Last visit 8/16/21  No new labs since last visit    C/O rash left shin since 8/2021 visit, now on right shin  -treated with triamcinolone without benefit  -his sister has psoriasis    C/O crack on corner of lips  Used lotrimin as advised at prior visit, helped but then recurred    Anxiety  Doing well on lexapro 20 mg daily  He feels much better on this dose    CAD, HTN - s/p NSTEMI 6/19/2018, s/p PCI CX Marginal and RCA  on 6/21/2018,  - followed at [facility] [address] by Dr. Albania Madison, on lipitor, metoprolol, plavix, aspirin, lasix.      Hyperlipidemia - on atorvastatin 80 mg, takes it daily  Last   CAD and HTN followed by Dr Albania Madison  No changes since last visit    Renal cell carcinoma  sees nephrology Dr Joel Keller q6-12 mos  Sees oncology Dr Violet Davis    Prediabetes - most recent A1C 6.1% on 1/20/21, prior A1C 5.8% on 2/11/2019, not good at watching his prediabetic diet right now     Vit D deficiency - not taking suppl currently, states he never took it when advised last in 2017 by Dr Carlo Renee    Patient Active Problem List    Diagnosis    Severe obesity ([address]    Kidney carcinoma ([address]    Unstable angina ([address]    Acquired ptosis of right eyelid    Blepharitis of eyelid of left eye    Blepharitis of eyelid of right eye    Cataract, nuclear sclerotic, both eyes    Papillary renal cell carcinoma ([address] based on CT result 6/2018    Non compliance with medical treatment    Vitamin D deficiency    Cigarette smoker    Anxiety    Pure hypercholesterolemia    CAD (coronary artery disease)    Essential hypertension         Past Medical History:   Diagnosis Date    Anxiety     CAD (coronary artery disease)     PTCA dr roger forte   2/19/16 note/ekg    CAD (coronary artery disease)     PTCA Dr. Albania Madison 06/2018    CHF (congestive heart failure) ([address] 2/2016    well compenstated per notes    Chronic kidney disease     mass on right kidney, diagnosed as cancer     Diverticulitis     with colon resection    Finger pain, right     Index finger- started 2 years- Painful if hit.  History of chicken pox     Hypercholesteremia     Hypertension     Insomnia     [facility] 07/2018    right, partial nephrectomy 8/2018, Dr. Leyla Buckley, no chemo    Knee pain 2009    ortho for cortisone injection    Papillary renal cell carcinoma ([facility] based on CT result 6/2018 6/8/2018    Prediabetes     Screening cholesterol level 02/12/15    from [facility]  and again 2/2016    Vitamin D deficiency     again 8/2014 again 12/2016       Social History     Tobacco Use    Smoking status: Current Every Day Smoker     Packs/day: 1.00     Years: 40.00     Pack years: 40.00     Types: Cigarettes    Smokeless tobacco: Never Used   Vaping Use    Vaping Use: Never used   Substance Use Topics    Alcohol use: Yes     Comment: rare social    Drug use: No       Outpatient Medications Marked as Taking for the 6/2/22 encounter (Office Visit) with Caitlin Muñiz PA-C   Medication Sig Dispense Refill    escitalopram oxalate (LEXAPRO) 20 mg tablet TAKE 1 TABLET BY MOUTH EVERY DAY. Office visit due before next refill 30 Tablet 0    atorvastatin (LIPITOR) 80 mg tablet Take 1 Tab by mouth daily. 30 Tab 6    metoprolol tartrate (LOPRESSOR) 50 mg tablet Take 0.5 Tabs by mouth two (2) times a day. (Patient taking differently: Take 25 mg by mouth daily.) 30 Tab 0    clopidogrel (PLAVIX) 75 mg tab Take 1 Tab by mouth daily. 45 Tab 0    aspirin (ASPIRIN) 325 mg tablet Take 1 Tab by mouth daily. 30 Tab 6    furosemide (LASIX) 20 mg tablet Take  by mouth daily. No Known Allergies    ROS:  ROS negative except as per HPI.       PE:  Visit Vitals  /70 (BP 1 Location: Left upper arm, BP Patient Position: Sitting, BP Cuff Size: Adult)   Pulse 84   Temp 98.4 °F (36.9 °C) (Oral) Resp 17   Ht 5' 8\" (1.727 m)   Wt 255 lb 11.2 oz (116 kg)   SpO2 95%   BMI 38.88 kg/m²     Gen: alert, oriented, no acute distress  Head: normocephalic, atraumatic  Eyes: pupils equal round reactive to light, sclera clear, conjunctiva clear  Oral: masked  Neck: symmetric normal sized thyroid, no carotid bruits, no jugular vein distention  Resp: no increase work of breathing, lungs clear to ausculation bilaterally, no wheezing, rales or rhonchi  CV: S1, S2 normal.  No murmurs, rubs, or gallops. Abd: soft, not tender, not distended. No hepatosplenomegaly. Normal bowel sounds. Neuro: grossly intact. Skin: (+)psoriasiform plaques noted on B pretibial, left more than right  (+)small crack/fissure corners of mouth  Extremities: no cyanosis or edema  Psych:  alert, oriented to person, place, and time, normal mood, behavior, speech, dress, motor activity, and thought processes, no suicidal ideations    No results found for this visit on 06/02/22. Assessment/Plan:      ICD-10-CM ICD-9-CM    1. Psoriasiform eruption  L30.8 696.8 clobetasoL (TEMOVATE) 0.05 % ointment      REFERRAL TO DERMATOLOGY   2. Angular cheilitis  K13.0 528.5    3. Anxiety with depression  F41.8 300.4 escitalopram oxalate (LEXAPRO) 20 mg tablet   4. Coronary artery disease involving native heart without angina pectoris, unspecified vessel or lesion type  I25.10 414.01    5. Pure hypercholesterolemia  E78.00 272.0    6. Essential hypertension  I10 401.9    7. Prediabetes  R73.03 790.29    8. Vitamin D deficiency  E55.9 268.9      1. Psoriasiform rash  No relief with triamcinolone last year  We will treat with clobetasol  A sister has psoriasis, referred him to dermatology and encouraged him to follow-up for further management    2. Angular cheilitis  Temporary benefit from clotrimazole  Advised to continue clotrimazole and also apply Aquaphor    3. Anxiety with depression well-controlled on Lexapro, continue same    4.   CAD followed by cardiology Dr. Jovanna Florez  On beta-blocker statin and aspirin    5. Hyperlipidemia  On atorvastatin 80 mg  Last , not at goal  Goal LDL less than 170 with his history of CAD  No new labs since last visit  He is not fasting today but due to compliance issues I will get his labs while he is here here and make adjustments accordingly    6. Hypertension controlled on current medicines, followed by cardiology, continue same    7. Prediabetes  No new labs since last visit  Reviewed prediabetic diet and will monitor labs today    8. Vitamin D deficiency  This is been low in the past but he is not taking any supplements  Will check levels with labs today and make recommendations pending results    9. Tobacco abuse, smoking cessation encouraged    He will have labs drawn today that were ordered back in August 2021    Health Maintenance reviewed - updated. Orders Placed This Encounter    REFERRAL TO DERMATOLOGY     Referral Priority:   Routine     Referral Type:   Consultation     Referral Reason:   Specialty Services Required     Referred to Provider:   Anna Castro MD     Requested Specialty:   Dermatology Physician     Number of Visits Requested:   1    escitalopram oxalate (LEXAPRO) 20 mg tablet     Sig: TAKE 1 TABLET BY MOUTH EVERY DAY. Office visit due before next refill     Dispense:  90 Tablet     Refill:  1    clobetasoL (TEMOVATE) 0.05 % ointment     Sig: Apply  to affected area two (2) times a day for 14 days. Apply to affected areas, avoid face     Dispense:  15 g     Refill:  0       Medications Discontinued During This Encounter   Medication Reason    escitalopram oxalate (LEXAPRO) 20 mg tablet REORDER       Current Outpatient Medications   Medication Sig Dispense Refill    escitalopram oxalate (LEXAPRO) 20 mg tablet TAKE 1 TABLET BY MOUTH EVERY DAY.  Office visit due before next refill 90 Tablet 1    clobetasoL (TEMOVATE) 0.05 % ointment Apply  to affected area two (2) times a day for 14 days. Apply to affected areas, avoid face 15 g 0    atorvastatin (LIPITOR) 80 mg tablet Take 1 Tab by mouth daily. 30 Tab 6    metoprolol tartrate (LOPRESSOR) 50 mg tablet Take 0.5 Tabs by mouth two (2) times a day. (Patient taking differently: Take 25 mg by mouth daily.) 30 Tab 0    clopidogrel (PLAVIX) 75 mg tab Take 1 Tab by mouth daily. 45 Tab 0    aspirin (ASPIRIN) 325 mg tablet Take 1 Tab by mouth daily. 30 Tab 6    furosemide (LASIX) 20 mg tablet Take  by mouth daily. Recommended healthy diet low in carbohydrates, fats, sodium and cholesterol. Recommended regular cardiovascular exercise 3-6 times per week for 30-60 minutes daily. Verbal and written instructions (see AVS) provided. Patient expresses understanding of diagnosis and treatment plan. Follow-up and Dispositions    · Return in about 6 months (around 12/2/2022) for anxiety.        Future Appointments   Date Time Provider Jose Mcguire   12/1/2022 11:00 AM Caitlin Muñiz, JOSSELYN PCAM BS AMB

## 2022-06-02 NOTE — PATIENT INSTRUCTIONS
Apply Clortrimazole to lips, along with Aquaphor lip treatment         Heart-Healthy Diet: Care Instructions  Your Care Instructions     A heart-healthy diet has lots of vegetables, fruits, nuts, beans, and whole grains, and is low in salt. It limits foods that are high in saturated fat, such as meats, cheeses, and fried foods. It may be hard to change your diet, but even small changes can lower your risk of heart attack and heart disease. Follow-up care is a key part of your treatment and safety. Be sure to make and go to all appointments, and call your doctor if you are having problems. It's also a good idea to know your test results and keep a list of the medicines you take. How can you care for yourself at home? Watch your portions  · Use food labels to learn what the recommended servings are for the foods you eat. · Eat only the number of calories you need to stay at a healthy weight. If you need to lose weight, eat fewer calories than your body burns (through exercise and other physical activity). Eat more fruits and vegetables  · Eat a variety of fruit and vegetables every day. Dark green, deep orange, red, or yellow fruits and vegetables are especially good for you. Examples include spinach, carrots, peaches, and berries. · Keep carrots, celery, and other veggies handy for snacks. Buy fruit that is in season and store it where you can see it so that you will be tempted to eat it. · Cook dishes that have a lot of veggies in them, such as stir-fries and soups. Limit saturated fat  · Read food labels, and try to avoid saturated fats. They increase your risk of heart disease. · Use olive or canola oil when you cook. · Bake, broil, grill, or steam foods instead of frying them. · Choose lean meats instead of high-fat meats such as hot dogs and sausages. Cut off all visible fat when you prepare meat. · Eat fish, skinless poultry, and meat alternatives such as soy products instead of high-fat meats.  Soy products, such as tofu, may be especially good for your heart. · Choose low-fat or fat-free milk and dairy products. Eat foods high in fiber  · Eat a variety of grain products every day. Include whole-grain foods that have lots of fiber and nutrients. Examples of whole-grain foods include oats, whole wheat bread, and brown rice. · Buy whole-grain breads and cereals, instead of white bread or pastries. Limit salt and sodium  · Limit how much salt and sodium you eat to help lower your blood pressure. · Taste food before you salt it. Add only a little salt when you think you need it. With time, your taste buds will adjust to less salt. · Eat fewer snack items, fast foods, and other high-salt, processed foods. Check food labels for the amount of sodium in packaged foods. · Choose low-sodium versions of canned goods (such as soups, vegetables, and beans). Limit sugar  · Limit drinks and foods with added sugar. These include candy, desserts, and soda pop. Limit alcohol  · Limit alcohol to no more than 2 drinks a day for men and 1 drink a day for women. Too much alcohol can cause health problems. When should you call for help? Watch closely for changes in your health, and be sure to contact your doctor if:    · You would like help planning heart-healthy meals. Where can you learn more? Go to http://www.handley.com/  Enter V137 in the search box to learn more about \"Heart-Healthy Diet: Care Instructions. \"  Current as of: September 8, 2021               Content Version: 13.2  © 2006-2022 [employer] Care instructions adapted under license by [employer] (which disclaims liability or warranty for this information). If you have questions about a medical condition or this instruction, always ask your healthcare professional. [address] any warranty or liability for your use of this information.

## 2022-06-03 DIAGNOSIS — E78.00 PURE HYPERCHOLESTEROLEMIA: ICD-10-CM

## 2022-06-03 DIAGNOSIS — E55.9 VITAMIN D DEFICIENCY: Primary | ICD-10-CM

## 2022-06-03 DIAGNOSIS — I25.10 CORONARY ARTERY DISEASE INVOLVING NATIVE HEART WITHOUT ANGINA PECTORIS, UNSPECIFIED VESSEL OR LESION TYPE: ICD-10-CM

## 2022-06-03 PROBLEM — F41.8 ANXIETY WITH DEPRESSION: Status: ACTIVE | Noted: 2022-06-03

## 2022-06-03 LAB
25(OH)D3 SERPL-MCNC: 21.9 NG/ML (ref 30–100)
ALBUMIN SERPL-MCNC: 4 G/DL (ref 3.5–5)
ALBUMIN/GLOB SERPL: 1.3 {RATIO} (ref 1.1–2.2)
ALP SERPL-CCNC: 109 U/L (ref 45–117)
ALT SERPL-CCNC: 37 U/L (ref 12–78)
ANION GAP SERPL CALC-SCNC: 8 MMOL/L (ref 5–15)
AST SERPL-CCNC: 22 U/L (ref 15–37)
BASOPHILS # BLD: 0.1 K/UL (ref 0–0.1)
BASOPHILS NFR BLD: 1 % (ref 0–1)
BILIRUB SERPL-MCNC: 0.4 MG/DL (ref 0.2–1)
BUN SERPL-MCNC: 13 MG/DL (ref 6–20)
BUN/CREAT SERPL: 15 (ref 12–20)
CALCIUM SERPL-MCNC: 9.1 MG/DL (ref 8.5–10.1)
CHLORIDE SERPL-SCNC: 113 MMOL/L (ref 97–108)
CHOLEST SERPL-MCNC: 189 MG/DL
CO2 SERPL-SCNC: 24 MMOL/L (ref 21–32)
CREAT SERPL-MCNC: 0.85 MG/DL (ref 0.7–1.3)
DIFFERENTIAL METHOD BLD: ABNORMAL
EOSINOPHIL # BLD: 0.2 K/UL (ref 0–0.4)
EOSINOPHIL NFR BLD: 3 % (ref 0–7)
ERYTHROCYTE [DISTWIDTH] IN BLOOD BY AUTOMATED COUNT: 14.1 % (ref 11.5–14.5)
EST. AVERAGE GLUCOSE BLD GHB EST-MCNC: 131 MG/DL
FERRITIN SERPL-MCNC: 92 NG/ML (ref 26–388)
GLOBULIN SER CALC-MCNC: 3 G/DL (ref 2–4)
GLUCOSE SERPL-MCNC: 100 MG/DL (ref 65–100)
HBA1C MFR BLD: 6.2 % (ref 4–5.6)
HCT VFR BLD AUTO: 46.5 % (ref 36.6–50.3)
HDLC SERPL-MCNC: 29 MG/DL
HDLC SERPL: 6.5 {RATIO} (ref 0–5)
HGB BLD-MCNC: 14.9 G/DL (ref 12.1–17)
IMM GRANULOCYTES # BLD AUTO: 0.1 K/UL (ref 0–0.04)
IMM GRANULOCYTES NFR BLD AUTO: 1 % (ref 0–0.5)
LDLC SERPL CALC-MCNC: 94.4 MG/DL (ref 0–100)
LYMPHOCYTES # BLD: 1.7 K/UL (ref 0.8–3.5)
LYMPHOCYTES NFR BLD: 19 % (ref 12–49)
MCH RBC QN AUTO: 29.7 PG (ref 26–34)
MCHC RBC AUTO-ENTMCNC: 32 G/DL (ref 30–36.5)
MCV RBC AUTO: 92.6 FL (ref 80–99)
MONOCYTES # BLD: 0.7 K/UL (ref 0–1)
MONOCYTES NFR BLD: 8 % (ref 5–13)
NEUTS SEG # BLD: 6 K/UL (ref 1.8–8)
NEUTS SEG NFR BLD: 68 % (ref 32–75)
NRBC # BLD: 0 K/UL (ref 0–0.01)
NRBC BLD-RTO: 0 PER 100 WBC
PLATELET # BLD AUTO: 212 K/UL (ref 150–400)
PMV BLD AUTO: 11.8 FL (ref 8.9–12.9)
POTASSIUM SERPL-SCNC: 4 MMOL/L (ref 3.5–5.1)
PROT SERPL-MCNC: 7 G/DL (ref 6.4–8.2)
RBC # BLD AUTO: 5.02 M/UL (ref 4.1–5.7)
SODIUM SERPL-SCNC: 145 MMOL/L (ref 136–145)
TRIGL SERPL-MCNC: 328 MG/DL (ref ?–150)
TSH SERPL DL<=0.05 MIU/L-ACNC: 1.63 UIU/ML (ref 0.36–3.74)
VLDLC SERPL CALC-MCNC: 65.6 MG/DL
WBC # BLD AUTO: 8.7 K/UL (ref 4.1–11.1)

## 2022-06-03 RX ORDER — ERGOCALCIFEROL 1.25 MG/1
50000 CAPSULE ORAL
Qty: 13 CAPSULE | Refills: 0 | Status: SHIPPED | OUTPATIENT
Start: 2022-06-03 | End: 2022-09-01

## 2022-06-03 RX ORDER — ROSUVASTATIN CALCIUM 20 MG/1
20 TABLET, COATED ORAL
Qty: 90 TABLET | Refills: 1 | Status: SHIPPED | OUTPATIENT
Start: 2022-06-03

## 2022-06-04 NOTE — PROGRESS NOTES
Please call with results-  -Your vitamin D is low. I will prescribe you weekly vitamin D called ergocalciferol 50,000 units; take this once weekly for 3 months. After you have completed the prescription for 3 months, then take Vitamin D3 2000 units once daily obtained over-the-counter and stay on this as a long-term daily supplement. We should recheck you levels in 6 months.     -Cholesterol has improved since last visit, but triglycerides are elevated which goes with elevated blood sugar, and his LDL is not at goal given his heart disease. I am switching his atorvastatin to rosuvastatin 20 mg. We need to recheck in 3 months. Follow low-fat diet.-Prediabetes has increased, A1c 6.2%, follow low sugar/low carbohydrate diet.   We need to recheck with his next labs in 3 months- Please schedule a 3-month office visit, come in fasting so we can do labs

## 2022-09-03 DIAGNOSIS — L30.8 PSORIASIFORM ERUPTION: ICD-10-CM

## 2022-09-08 RX ORDER — CLOBETASOL PROPIONATE 0.5 MG/G
OINTMENT TOPICAL
Qty: 15 G | Refills: 0 | Status: SHIPPED | OUTPATIENT
Start: 2022-09-08

## 2022-12-01 ENCOUNTER — OFFICE VISIT (OUTPATIENT)
Dept: INTERNAL MEDICINE CLINIC | Age: 62
End: 2022-12-01
Payer: COMMERCIAL

## 2022-12-01 VITALS
BODY MASS INDEX: 39.47 KG/M2 | HEART RATE: 81 BPM | SYSTOLIC BLOOD PRESSURE: 140 MMHG | OXYGEN SATURATION: 97 % | RESPIRATION RATE: 18 BRPM | TEMPERATURE: 98 F | WEIGHT: 259.6 LBS | DIASTOLIC BLOOD PRESSURE: 78 MMHG

## 2022-12-01 DIAGNOSIS — F41.8 ANXIETY WITH DEPRESSION: Primary | ICD-10-CM

## 2022-12-01 DIAGNOSIS — Z23 NEEDS FLU SHOT: ICD-10-CM

## 2022-12-01 DIAGNOSIS — L30.8 PSORIASIFORM ERUPTION: ICD-10-CM

## 2022-12-01 DIAGNOSIS — E55.9 VITAMIN D DEFICIENCY: ICD-10-CM

## 2022-12-01 DIAGNOSIS — R73.03 PREDIABETES: ICD-10-CM

## 2022-12-01 DIAGNOSIS — I25.10 CORONARY ARTERY DISEASE INVOLVING NATIVE HEART WITHOUT ANGINA PECTORIS, UNSPECIFIED VESSEL OR LESION TYPE: ICD-10-CM

## 2022-12-01 DIAGNOSIS — E78.00 PURE HYPERCHOLESTEROLEMIA: ICD-10-CM

## 2022-12-01 RX ORDER — CLOBETASOL PROPIONATE 0.5 MG/G
OINTMENT TOPICAL
Qty: 15 G | Refills: 0 | Status: SHIPPED | OUTPATIENT
Start: 2022-12-01

## 2022-12-01 NOTE — PROGRESS NOTES
HPI:  58 y.o.  presents for follow up appointment. No hospital, ER or specialist visits since last primary care visit except as noted below. Last visit 6/2/22    Anxiety  Doing well on lexapro 20 mg daily  He feels much better on this dose     CAD, HTN - s/p NSTEMI 6/19/2018, s/p PCI CX Marginal and RCA  on 6/21/2018,  - followed at [address] [place] [address] Ave by Dr. Christoph Benitez, on crestor (6/2022 switch from lipitor), metoprolol, plavix, aspirin, lasix.    -no CP, dyspnea, RAMIREZ  -he [place] the golf course and feels well, other than chronic fatigue that has been stable     Hyperlipidemia   On rosuvastatin 20 mg (6/2022)  -switched from atorvastatin 80 mg  Last LDL 94 on atorvastatin 80 mg in 6/2022  CAD and HTN followed by Dr Christoph Benitez       Renal cell carcinoma  sees nephrology Dr Enrrique Mabry q6-12 mos  Sees oncology Dr Sho García     Prediabetes - most recent A1C 6.2 % in 6/2022; 6.1% on 1/20/21, prior A1C 5.8% on 2/11/2019, not good at watching his prediabetic diet right now     Vit D deficiency -   -Most recent D level 21.9 in 6/2022, I sent in ergocalciferol 6/2022 which he completed weekly for 3 mos    He is not fasting today, he ate an egg McMuffin with a coke before his appt    Patient Active Problem List    Diagnosis    Anxiety with depression    Severe obesity ([address]    Kidney carcinoma ([address]    Unstable angina ([address]    Acquired ptosis of right eyelid    Blepharitis of eyelid of left eye    Blepharitis of eyelid of right eye    Cataract, nuclear sclerotic, both eyes    Papillary renal cell carcinoma ([address] based on CT result 6/2018    Non compliance with medical treatment    Prediabetes    Vitamin D deficiency    Cigarette smoker    Anxiety    Pure hypercholesterolemia    CAD (coronary artery disease)    Essential hypertension         Past Medical History:   Diagnosis Date    Anxiety     CAD (coronary artery disease)     PTCA dr roger forte   2/19/16 note/ekg    CAD (coronary artery disease)     PTCA Dr. Christoph Benitez 06/2018    CHF (congestive [facility] failure) ([facility] 2/2016    well compenstated per notes    Chronic kidney disease     mass on right kidney, diagnosed as cancer     Diverticulitis     with colon resection    Finger pain, right     Index finger- started 2 years- Painful if hit. History of chicken pox     Hypercholesteremia     Hypertension     Insomnia     Kidney carcinoma ([facility] 07/2018    right, partial nephrectomy 8/2018, Dr. María Posey, no chemo    Knee pain 2009    ortho for cortisone injection    Papillary renal cell carcinoma ([facility] based on CT result 6/2018 6/8/2018    Prediabetes     Screening cholesterol level 02/12/15    from [facility] clinic  and again 2/2016    Vitamin D deficiency     again 8/2014 again 12/2016       Social History     Tobacco Use    Smoking status: Every Day     Packs/day: 1.00     Years: 40.00     Pack years: 40.00     Types: Cigarettes    Smokeless tobacco: Never   Vaping Use    Vaping Use: Never used   Substance Use Topics    Alcohol use: Yes     Comment: rare social    Drug use: No           No Known Allergies    ROS:  ROS negative except as per HPI. PE:  Visit Vitals  BP (!) 140/78 (BP 1 Location: Left arm, BP Patient Position: Sitting, BP Cuff Size: Large adult)   Pulse 81   Temp 98 °F (36.7 °C) (Oral)   Resp 18   Wt 259 lb 9.6 oz (117.8 kg)   SpO2 97%   BMI 39.47 kg/m²     Gen: alert, oriented, no acute distress  Head: normocephalic, atraumatic  Eyes: pupils equal round reactive to light, sclera clear, conjunctiva clear  Oral: masked  Neck: symmetric normal sized thyroid, no carotid bruits, no jugular vein distention  Resp: no increase work of breathing, lungs clear to ausculation bilaterally, no wheezing, rales or rhonchi  CV: S1, S2 normal.  No murmurs, rubs, or gallops. Abd: soft, not tender, not distended. Normal bowel sounds.     Neuro: grossly intact  Skin: healing psoriaform plaque on both anterior tibia left more than right and both olecranons  Extremities: no cyanosis or edema    No results found for this visit on 12/01/22. Lab Results   Component Value Date/Time    WBC 8.7 06/02/2022 04:00 PM    HGB 14.9 06/02/2022 04:00 PM    HCT 46.5 06/02/2022 04:00 PM    PLATELET 480 32/10/4793 04:00 PM    MCV 92.6 06/02/2022 04:00 PM     Lab Results   Component Value Date/Time    Sodium 145 06/02/2022 04:00 PM    Potassium 4.0 06/02/2022 04:00 PM    Chloride 113 (H) 06/02/2022 04:00 PM    CO2 24 06/02/2022 04:00 PM    Anion gap 8 06/02/2022 04:00 PM    Glucose 100 06/02/2022 04:00 PM    BUN 13 06/02/2022 04:00 PM    Creatinine 0.85 06/02/2022 04:00 PM    BUN/Creatinine ratio 15 06/02/2022 04:00 PM    GFR est AA >60 06/02/2022 04:00 PM    GFR est non-AA >60 06/02/2022 04:00 PM    Calcium 9.1 06/02/2022 04:00 PM    Bilirubin, total 0.4 06/02/2022 04:00 PM    Alk. phosphatase 109 06/02/2022 04:00 PM    Protein, total 7.0 06/02/2022 04:00 PM    Albumin 4.0 06/02/2022 04:00 PM    Globulin 3.0 06/02/2022 04:00 PM    A-G Ratio 1.3 06/02/2022 04:00 PM    ALT (SGPT) 37 06/02/2022 04:00 PM    AST (SGOT) 22 06/02/2022 04:00 PM     Lab Results   Component Value Date/Time    Cholesterol, total 189 06/02/2022 04:00 PM    HDL Cholesterol 29 06/02/2022 04:00 PM    LDL-C, External [facility] 02/12/2016 12:00 AM    LDL, calculated 94.4 06/02/2022 04:00 PM    VLDL, calculated 65.6 06/02/2022 04:00 PM    Triglyceride 328 (H) 06/02/2022 04:00 PM    CHOL/HDL Ratio 6.5 (H) 06/02/2022 04:00 PM     Lab Results   Component Value Date/Time    Hemoglobin A1c 6.2 (H) 06/02/2022 04:00 PM    Hemoglobin A1c (POC) 5.5 12/05/2017 10:22 AM         Assessment/Plan:      ICD-10-CM ICD-9-CM    1. Anxiety with depression  F41.8 300.4       2. Pure hypercholesterolemia  R23.06 821.8 METABOLIC PANEL, COMPREHENSIVE      LIPID PANEL      3. Coronary artery disease involving native heart without angina pectoris, unspecified vessel or lesion type  I25.10 414.01       4.  Needs flu shot  Z23 V04.81 INFLUENZA, FLUARIX, FLULAVAL, FLUZONE (AGE 6 MO+), AFLURIA(AGE 3Y+) IM, PF, 0.5 ML      5. 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 Vitamin D deficiency  E55.9 268.9 VITAMIN D, 25 HYDROXY      7. Prediabetes  R73.03 790.29 HEMOGLOBIN A1C WITH EAG        1. Anxiety with depression  Controlled on Lexapro 20 mg daily, continue same    2. Hyperlipidemia  Current therapy: On rosuvastatin 20 mg (started 6/2022 when LDL was still 94 and above goal on atorvastatin 80 mg)  He has CAD, with goal LDL less than 70  He is not fasting today, so we will schedule fasting labs to recheck lipids on rosuvastatin and make further recommendations  Low-cholesterol diet reviewed    3. CAD and HTN  Status post NSTEMI 6/2018 with PCI to CX marginal and RCA     Followed by cardiology Dr. Roseanne Reeder    4. Flu shot today    5. Psoriasiform rash improved with clobetasol  Will refill to use as needed but recommend further follow-up with dermatology  He is reprinted the referral from last visit he will call to schedule    6. Vitamin D deficiency  Completed ergocalciferol that was prescribed in June  Currently not on D3 supplement  We will check vitamin D levels with next labs and make further recommendations    7. Prediabetes  Most recent A1c 6.2% on 6/2022  Reviewed prediabetic diet, risk for advancing to diabetes, handout given  We will check A1c and make further recommendations      Health Maintenance reviewed - updated.     Orders Placed This Encounter    Influenza, FLUARIX, FLULAVAL, Fluzone (age 10 mo+), AFLURIA (age 3y+) IM, PF, 0.5 mL    METABOLIC PANEL, COMPREHENSIVE     Standing Status:   Future     Standing Expiration Date:   12/1/2023    HEMOGLOBIN A1C WITH EAG     Standing Status:   Future     Standing Expiration Date:   12/1/2023    LIPID PANEL     Standing Status:   Future     Standing Expiration Date:   12/1/2023    VITAMIN D, 25 HYDROXY     Standing Status:   Future     Standing Expiration Date:   12/1/2023 clobetasoL (TEMOVATE) 0.05 % ointment     Sig: APPLY TO AFFECTED AREA TWO (2) TIMES A DAY FOR 14 DAYS. AVOID FACE     Dispense:  15 g     Refill:  0       Medications Discontinued During This Encounter   Medication Reason    clobetasoL (TEMOVATE) 0.05 % ointment REORDER       Current Outpatient Medications   Medication Sig Dispense Refill    clobetasoL (TEMOVATE) 0.05 % ointment APPLY TO AFFECTED AREA TWO (2) TIMES A DAY FOR 14 DAYS. AVOID FACE 15 g 0    escitalopram oxalate (LEXAPRO) 20 mg tablet TAKE 1 TABLET BY MOUTH EVERY DAY. 90 Tablet 1    rosuvastatin (CRESTOR) 20 mg tablet Take 1 Tablet by mouth nightly. (TO REPLACE ATORVASTATIN) 90 Tablet 1    metoprolol tartrate (LOPRESSOR) 50 mg tablet Take 0.5 Tabs by mouth two (2) times a day. (Patient taking differently: Take 25 mg by mouth daily.) 30 Tab 0    clopidogrel (PLAVIX) 75 mg tab Take 1 Tab by mouth daily. 45 Tab 0    aspirin (ASPIRIN) 325 mg tablet Take 1 Tab by mouth daily. 30 Tab 6    furosemide (LASIX) 20 mg tablet Take  by mouth daily. Recommended healthy diet low in carbohydrates, fats, sodium and cholesterol. Recommended regular cardiovascular exercise 3-6 times per week for 30-60 minutes daily. Verbal and written instructions (see AVS) provided. Patient expresses understanding of diagnosis and treatment plan. Follow-up and Dispositions    Return in about 3 months (around 3/1/2023) for prediabetes, cholesterol; fasting lab appt SOON.        Future Appointments   Date Time Provider Jose Mcguire   12/12/2022 10:20 AM LAB ONLY [facility]AUHAN   3/1/2023 11:00 AM JANE Easton AMB

## 2022-12-03 ENCOUNTER — HOSPITAL ENCOUNTER (EMERGENCY)
Age: 62
Discharge: HOME | End: 2022-12-03
Attending: STUDENT IN AN ORGANIZED HEALTH CARE EDUCATION/TRAINING PROGRAM
Payer: COMMERCIAL

## 2022-12-03 ENCOUNTER — APPOINTMENT (OUTPATIENT)
Dept: CT IMAGING | Age: 62
End: 2022-12-03
Attending: STUDENT IN AN ORGANIZED HEALTH CARE EDUCATION/TRAINING PROGRAM
Payer: COMMERCIAL

## 2022-12-03 VITALS
DIASTOLIC BLOOD PRESSURE: 72 MMHG | TEMPERATURE: 98.2 F | RESPIRATION RATE: 18 BRPM | OXYGEN SATURATION: 94 % | HEART RATE: 71 BPM | WEIGHT: 257.94 LBS | SYSTOLIC BLOOD PRESSURE: 168 MMHG | HEIGHT: 69 IN | BODY MASS INDEX: 38.2 KG/M2

## 2022-12-03 DIAGNOSIS — N20.0 NEPHROLITHIASIS: Primary | ICD-10-CM

## 2022-12-03 LAB
ALBUMIN SERPL-MCNC: 3.8 G/DL (ref 3.5–5)
ALBUMIN/GLOB SERPL: 1.2 {RATIO} (ref 1.1–2.2)
ALP SERPL-CCNC: 104 U/L (ref 45–117)
ALT SERPL-CCNC: 29 U/L (ref 12–78)
ANION GAP SERPL CALC-SCNC: 5 MMOL/L (ref 5–15)
APPEARANCE UR: CLEAR
AST SERPL-CCNC: 15 U/L (ref 15–37)
ATRIAL RATE: 71 BPM
BACTERIA URNS QL MICRO: NEGATIVE /HPF
BASOPHILS # BLD: 0.1 K/UL (ref 0–0.1)
BASOPHILS NFR BLD: 1 % (ref 0–1)
BILIRUB SERPL-MCNC: 0.6 MG/DL (ref 0.2–1)
BILIRUB UR QL: NEGATIVE
BUN SERPL-MCNC: 16 MG/DL (ref 6–20)
BUN/CREAT SERPL: 14 (ref 12–20)
CALCIUM SERPL-MCNC: 8.8 MG/DL (ref 8.5–10.1)
CALCULATED P AXIS, ECG09: 40 DEGREES
CALCULATED R AXIS, ECG10: 42 DEGREES
CALCULATED T AXIS, ECG11: 26 DEGREES
CHLORIDE SERPL-SCNC: 110 MMOL/L (ref 97–108)
CO2 SERPL-SCNC: 27 MMOL/L (ref 21–32)
COLOR UR: ABNORMAL
CREAT SERPL-MCNC: 1.17 MG/DL (ref 0.7–1.3)
DIAGNOSIS, 93000: NORMAL
DIFFERENTIAL METHOD BLD: ABNORMAL
EOSINOPHIL # BLD: 0.3 K/UL (ref 0–0.4)
EOSINOPHIL NFR BLD: 3 % (ref 0–7)
EPITH CASTS URNS QL MICRO: ABNORMAL /LPF
ERYTHROCYTE [DISTWIDTH] IN BLOOD BY AUTOMATED COUNT: 13.6 % (ref 11.5–14.5)
GLOBULIN SER CALC-MCNC: 3.3 G/DL (ref 2–4)
GLUCOSE SERPL-MCNC: 168 MG/DL (ref 65–100)
GLUCOSE UR STRIP.AUTO-MCNC: NEGATIVE MG/DL
HCT VFR BLD AUTO: 45.3 % (ref 36.6–50.3)
HGB BLD-MCNC: 15.1 G/DL (ref 12.1–17)
HGB UR QL STRIP: ABNORMAL
IMM GRANULOCYTES # BLD AUTO: 0.1 K/UL (ref 0–0.04)
IMM GRANULOCYTES NFR BLD AUTO: 1 % (ref 0–0.5)
KETONES UR QL STRIP.AUTO: NEGATIVE MG/DL
LEUKOCYTE ESTERASE UR QL STRIP.AUTO: NEGATIVE
LIPASE SERPL-CCNC: 419 U/L (ref 73–393)
LYMPHOCYTES # BLD: 1.5 K/UL (ref 0.8–3.5)
LYMPHOCYTES NFR BLD: 16 % (ref 12–49)
MCH RBC QN AUTO: 29.7 PG (ref 26–34)
MCHC RBC AUTO-ENTMCNC: 33.3 G/DL (ref 30–36.5)
MCV RBC AUTO: 89.2 FL (ref 80–99)
MONOCYTES # BLD: 1 K/UL (ref 0–1)
MONOCYTES NFR BLD: 10 % (ref 5–13)
NEUTS SEG # BLD: 6.4 K/UL (ref 1.8–8)
NEUTS SEG NFR BLD: 69 % (ref 32–75)
NITRITE UR QL STRIP.AUTO: NEGATIVE
NRBC # BLD: 0 K/UL (ref 0–0.01)
NRBC BLD-RTO: 0 PER 100 WBC
P-R INTERVAL, ECG05: 160 MS
PH UR STRIP: 5 [PH] (ref 5–8)
PLATELET # BLD AUTO: 256 K/UL (ref 150–400)
PMV BLD AUTO: 11 FL (ref 8.9–12.9)
POTASSIUM SERPL-SCNC: 3.8 MMOL/L (ref 3.5–5.1)
PROT SERPL-MCNC: 7.1 G/DL (ref 6.4–8.2)
PROT UR STRIP-MCNC: NEGATIVE MG/DL
Q-T INTERVAL, ECG07: 442 MS
QRS DURATION, ECG06: 108 MS
QTC CALCULATION (BEZET), ECG08: 480 MS
RBC # BLD AUTO: 5.08 M/UL (ref 4.1–5.7)
RBC #/AREA URNS HPF: ABNORMAL /HPF (ref 0–5)
SODIUM SERPL-SCNC: 142 MMOL/L (ref 136–145)
SP GR UR REFRACTOMETRY: 1.02 (ref 1–1.03)
TROPONIN-HIGH SENSITIVITY: 11 NG/L (ref 0–76)
UR CULT HOLD, URHOLD: NORMAL
UROBILINOGEN UR QL STRIP.AUTO: 0.2 EU/DL (ref 0.2–1)
VENTRICULAR RATE, ECG03: 71 BPM
WBC # BLD AUTO: 9.3 K/UL (ref 4.1–11.1)
WBC URNS QL MICRO: ABNORMAL /HPF (ref 0–4)

## 2022-12-03 PROCEDURE — 83690 ASSAY OF LIPASE: CPT

## 2022-12-03 PROCEDURE — 74011250636 HC RX REV CODE- 250/636: Performed by: EMERGENCY MEDICINE

## 2022-12-03 PROCEDURE — 99285 EMERGENCY DEPT VISIT HI MDM: CPT

## 2022-12-03 PROCEDURE — 36415 COLL VENOUS BLD VENIPUNCTURE: CPT

## 2022-12-03 PROCEDURE — 74177 CT ABD & PELVIS W/CONTRAST: CPT

## 2022-12-03 PROCEDURE — 96375 TX/PRO/DX INJ NEW DRUG ADDON: CPT

## 2022-12-03 PROCEDURE — 96374 THER/PROPH/DIAG INJ IV PUSH: CPT

## 2022-12-03 PROCEDURE — 85025 COMPLETE CBC W/AUTO DIFF WBC: CPT

## 2022-12-03 PROCEDURE — 84484 ASSAY OF TROPONIN QUANT: CPT

## 2022-12-03 PROCEDURE — 80053 COMPREHEN METABOLIC PANEL: CPT

## 2022-12-03 PROCEDURE — 93005 ELECTROCARDIOGRAM TRACING: CPT

## 2022-12-03 PROCEDURE — 81001 URINALYSIS AUTO W/SCOPE: CPT

## 2022-12-03 PROCEDURE — 74011250636 HC RX REV CODE- 250/636: Performed by: STUDENT IN AN ORGANIZED HEALTH CARE EDUCATION/TRAINING PROGRAM

## 2022-12-03 PROCEDURE — 74011000636 HC RX REV CODE- 636: Performed by: STUDENT IN AN ORGANIZED HEALTH CARE EDUCATION/TRAINING PROGRAM

## 2022-12-03 PROCEDURE — 99281 EMR DPT VST MAYX REQ PHY/QHP: CPT | Performed by: SURGERY

## 2022-12-03 RX ORDER — HYDROMORPHONE HYDROCHLORIDE 1 MG/ML
1 INJECTION, SOLUTION INTRAMUSCULAR; INTRAVENOUS; SUBCUTANEOUS ONCE
Status: COMPLETED | OUTPATIENT
Start: 2022-12-03 | End: 2022-12-03

## 2022-12-03 RX ORDER — TAMSULOSIN HYDROCHLORIDE 0.4 MG/1
0.4 CAPSULE ORAL DAILY
Qty: 15 CAPSULE | Refills: 0 | Status: SHIPPED | OUTPATIENT
Start: 2022-12-03 | End: 2022-12-18

## 2022-12-03 RX ORDER — OXYCODONE HYDROCHLORIDE 5 MG/1
5 TABLET ORAL
Qty: 7 TABLET | Refills: 0 | Status: SHIPPED | OUTPATIENT
Start: 2022-12-03 | End: 2022-12-08

## 2022-12-03 RX ORDER — KETOROLAC TROMETHAMINE 30 MG/ML
15 INJECTION, SOLUTION INTRAMUSCULAR; INTRAVENOUS
Status: COMPLETED | OUTPATIENT
Start: 2022-12-03 | End: 2022-12-03

## 2022-12-03 RX ORDER — MORPHINE SULFATE 2 MG/ML
4 INJECTION, SOLUTION INTRAMUSCULAR; INTRAVENOUS
Status: COMPLETED | OUTPATIENT
Start: 2022-12-03 | End: 2022-12-03

## 2022-12-03 RX ORDER — ONDANSETRON 2 MG/ML
4 INJECTION INTRAMUSCULAR; INTRAVENOUS
Status: COMPLETED | OUTPATIENT
Start: 2022-12-03 | End: 2022-12-03

## 2022-12-03 RX ORDER — ONDANSETRON 4 MG/1
4 TABLET, FILM COATED ORAL
Qty: 20 TABLET | Refills: 0 | Status: SHIPPED | OUTPATIENT
Start: 2022-12-03

## 2022-12-03 RX ADMIN — SODIUM CHLORIDE 1000 ML: 9 INJECTION, SOLUTION INTRAVENOUS at 04:22

## 2022-12-03 RX ADMIN — MORPHINE SULFATE 4 MG: 2 INJECTION, SOLUTION INTRAMUSCULAR; INTRAVENOUS at 04:30

## 2022-12-03 RX ADMIN — HYDROMORPHONE HYDROCHLORIDE 1 MG: 1 INJECTION, SOLUTION INTRAMUSCULAR; INTRAVENOUS; SUBCUTANEOUS at 05:55

## 2022-12-03 RX ADMIN — ONDANSETRON 4 MG: 2 INJECTION INTRAMUSCULAR; INTRAVENOUS at 04:29

## 2022-12-03 RX ADMIN — KETOROLAC TROMETHAMINE 15 MG: 30 INJECTION, SOLUTION INTRAMUSCULAR; INTRAVENOUS at 08:43

## 2022-12-03 RX ADMIN — IOPAMIDOL 100 ML: 755 INJECTION, SOLUTION INTRAVENOUS at 05:38

## 2022-12-03 NOTE — ED PROVIDER NOTES
EMERGENCY DEPARTMENT HISTORY AND PHYSICAL EXAM      Date: 12/3/2022  Patient Name: Dewayne Parekh    History of Presenting Illness     Chief Complaint   Patient presents with    Flank Pain     Pain in left flank. Played golf yesterday for the first time in a few years, thinks it could be muscular. Tried to lay down but pain was constant. Pain rates as a 10 out of 10. States that he vomited 3x tonight. Hx of kidney cancer. History Provided By: Patient    HPI: Dewayne Parekh, 58 y.o. male with a past medical history significant for medical problems as stated below presents to the ED with cc of 24 hours of left-sided flank pain. He reports that initially 24 hours ago he started having intermittent pain, but the pain is become persistent. He reports that is a 10 out of 10. He has not noticed any ameliorating or exacerbating factors. Reported in triage that he recently played golf and was concerned about the possibility of musculoskeletal pain. He also reports that he has a history of a AAA that has been repaired, colectomy, bowel obstructions due to adhesions, and bilateral kidney cancer. He has had a right-sided nephrectomy, but recently he is only had monitoring of his left kidney malignancy. He has had 3 episodes of nonbilious, nonbloody vomiting tonight. Reports he has not had a bowel movement or passed flatus in past 24 hours. PCP: Caitlin Muñiz PA-C    No current facility-administered medications on file prior to encounter. Current Outpatient Medications on File Prior to Encounter   Medication Sig Dispense Refill    clobetasoL (TEMOVATE) 0.05 % ointment APPLY TO AFFECTED AREA TWO (2) TIMES A DAY FOR 14 DAYS. AVOID FACE 15 g 0    escitalopram oxalate (LEXAPRO) 20 mg tablet TAKE 1 TABLET BY MOUTH EVERY DAY. 90 Tablet 1    rosuvastatin (CRESTOR) 20 mg tablet Take 1 Tablet by mouth nightly.  (TO REPLACE ATORVASTATIN) 90 Tablet 1    metoprolol tartrate (LOPRESSOR) 50 mg tablet Take 0.5 Tabs by mouth two (2) times a day. (Patient taking differently: Take 25 mg by mouth daily.) 30 Tab 0    clopidogrel (PLAVIX) 75 mg tab Take 1 Tab by mouth daily. 45 Tab 0    aspirin (ASPIRIN) 325 mg tablet Take 1 Tab by mouth daily. (Patient taking differently: Take 83 mg by mouth daily.) 30 Tab 6    furosemide (LASIX) 20 mg tablet Take  by mouth daily. Past History     Past Medical History:  Past Medical History:   Diagnosis Date    Anxiety     CAD (coronary artery disease)     PTCA dr roger forte   2/19/16 note/ekg    CAD (coronary artery disease)     PTCA Dr. Arnaldo Stokes 06/2018    CHF (congestive heart failure) ([facility] 2/2016    well compenstated per notes    Chronic kidney disease     mass on right kidney, diagnosed as cancer     Diverticulitis     with colon resection    Finger pain, right     Index finger- started 2 years- Painful if hit.     History of chicken pox     Hypercholesteremia     Hypertension     Insomnia     Kidney carcinoma ([facility] [address] 07/2018    right, partial nephrectomy 8/2018, Dr. Diomedes Farooq, no chemo    Knee pain 2009    ortho for cortisone injection    Papillary renal cell carcinoma ([facility] [address] based on CT result 6/2018 6/8/2018    Prediabetes     Screening cholesterol level 02/12/15    from [facility]  and again 2/2016    Vitamin D deficiency     again 8/2014 again 12/2016       Past Surgical History:  Past Surgical History:   Procedure Laterality Date    COLONOSCOPY  9/06    dr Oscar Shahid- normal repeat 10 years    EGD  9/06     dr Durga Pedersen, DIAGNOSTIC  9/2006    HX COLONOSCOPY  01/10/2017    10 yr repeat    HX OTHER SURGICAL  3/17/16    exercise strss test report [place]    HX TOTAL COLECTOMY      for ruptured diverticulitis    MD ABDOMEN SURGERY PROC UNLISTED      aaa,inguinal hernia, hemicolectomy    MD CARDIAC SURG PROCEDURE UNLIST  2005    ptca and stents    MD CARDIAC SURG PROCEDURE UNLIST  06/2018    PTCA with stents        Family History:  Family History Problem Relation Age of Onset    Diabetes Mother     Heart Disease Father        Social History:  Social History     Tobacco Use    Smoking status: Every Day     Packs/day: 2.00     Years: 40.00     Pack years: 80.00     Types: Cigarettes    Smokeless tobacco: Never   Vaping Use    Vaping Use: Never used   Substance Use Topics    Alcohol use: Yes     Comment: rare social    Drug use: No       Allergies:  No Known Allergies      Review of Systems   Review of Systems   Constitutional:  Negative for appetite change. HENT:  Negative for sore throat. Eyes:  Negative for visual disturbance. Respiratory:  Negative for cough and shortness of breath. Cardiovascular:  Negative for chest pain. Gastrointestinal:  Positive for diarrhea and nausea. Negative for abdominal pain and vomiting. Genitourinary:  Positive for flank pain. Negative for difficulty urinating, dysuria and hematuria. Musculoskeletal:  Negative for myalgias. Skin:  Negative for color change. Neurological:  Negative for dizziness and headaches. Psychiatric/Behavioral:  Negative for agitation. Physical Exam   Physical Exam  Constitutional:       General: He is not in acute distress. Appearance: Normal appearance. He is not toxic-appearing or diaphoretic. HENT:      Head: Normocephalic and atraumatic. Mouth/Throat:      Mouth: Mucous membranes are moist.   Eyes:      Conjunctiva/sclera: Conjunctivae normal.      Pupils: Pupils are equal, round, and reactive to light. Cardiovascular:      Rate and Rhythm: Normal rate and regular rhythm. Pulmonary:      Effort: Pulmonary effort is normal. No respiratory distress. Breath sounds: Normal breath sounds. Abdominal:      General: Abdomen is flat. There is no distension. Palpations: Abdomen is soft. Tenderness: There is no abdominal tenderness. There is no guarding or rebound. Comments: Midline abdominal scar   Musculoskeletal:         General: No swelling. Normal range of motion. Cervical back: Normal range of motion. Comments: Left flank pain with left CVA tenderness  No right CVA tenderness   Skin:     General: Skin is warm and dry. Capillary Refill: Capillary refill takes less than 2 seconds. Neurological:      General: No focal deficit present. Mental Status: He is alert and oriented to person, place, and time. Psychiatric:         Mood and Affect: Mood normal.       Diagnostic Study Results     Labs -     Recent Results (from the past 24 hour(s))   CBC WITH AUTOMATED DIFF    Collection Time: 12/03/22  4:23 AM   Result Value Ref Range    WBC 9.3 4.1 - 11.1 K/uL    RBC 5.08 4.10 - 5.70 M/uL    HGB 15.1 12.1 - 17.0 g/dL    HCT 45.3 36.6 - 50.3 %    MCV 89.2 80.0 - 99.0 FL    MCH 29.7 26.0 - 34.0 PG    MCHC 33.3 30.0 - 36.5 g/dL    RDW 13.6 11.5 - 14.5 %    PLATELET 097 560 - 004 K/uL    MPV 11.0 8.9 - 12.9 FL    NRBC 0.0 0  WBC    ABSOLUTE NRBC 0.00 0.00 - 0.01 K/uL    NEUTROPHILS 69 32 - 75 %    LYMPHOCYTES 16 12 - 49 %    MONOCYTES 10 5 - 13 %    EOSINOPHILS 3 0 - 7 %    BASOPHILS 1 0 - 1 %    IMMATURE GRANULOCYTES 1 (H) 0.0 - 0.5 %    ABS. NEUTROPHILS 6.4 1.8 - 8.0 K/UL    ABS. LYMPHOCYTES 1.5 0.8 - 3.5 K/UL    ABS. MONOCYTES 1.0 0.0 - 1.0 K/UL    ABS. EOSINOPHILS 0.3 0.0 - 0.4 K/UL    ABS. BASOPHILS 0.1 0.0 - 0.1 K/UL    ABS. IMM. GRANS. 0.1 (H) 0.00 - 0.04 K/UL    DF AUTOMATED     METABOLIC PANEL, COMPREHENSIVE    Collection Time: 12/03/22  4:23 AM   Result Value Ref Range    Sodium 142 136 - 145 mmol/L    Potassium 3.8 3.5 - 5.1 mmol/L    Chloride 110 (H) 97 - 108 mmol/L    CO2 27 21 - 32 mmol/L    Anion gap 5 5 - 15 mmol/L    Glucose 168 (H) 65 - 100 mg/dL    BUN 16 6 - 20 MG/DL    Creatinine 1.17 0.70 - 1.30 MG/DL    BUN/Creatinine ratio 14 12 - 20      eGFR >60 >60 ml/min/1.73m2    Calcium 8.8 8.5 - 10.1 MG/DL    Bilirubin, total 0.6 0.2 - 1.0 MG/DL    ALT (SGPT) 29 12 - 78 U/L    AST (SGOT) 15 15 - 37 U/L    Alk.  phosphatase 104 45 - 117 U/L    Protein, total 7.1 6.4 - 8.2 g/dL    Albumin 3.8 3.5 - 5.0 g/dL    Globulin 3.3 2.0 - 4.0 g/dL    A-G Ratio 1.2 1.1 - 2.2     TROPONIN-HIGH SENSITIVITY    Collection Time: 12/03/22  4:23 AM   Result Value Ref Range    Troponin-High Sensitivity 11 0 - 76 ng/L   LIPASE    Collection Time: 12/03/22  4:23 AM   Result Value Ref Range    Lipase 419 (H) 73 - 393 U/L   EKG, 12 LEAD, INITIAL    Collection Time: 12/03/22  4:36 AM   Result Value Ref Range    Ventricular Rate 71 BPM    Atrial Rate 71 BPM    P-R Interval 160 ms    QRS Duration 108 ms    Q-T Interval 442 ms    QTC Calculation (Bezet) 480 ms    Calculated P Axis 40 degrees    Calculated R Axis 42 degrees    Calculated T Axis 26 degrees    Diagnosis       Normal sinus rhythm  Prolonged QT  When compared with ECG of 22-JUN-2018 06:43,  No significant change was found  Confirmed by Mara Maldonado ([id number]) on 12/3/2022 5:31:38 PM     URINALYSIS W/ RFLX MICROSCOPIC    Collection Time: 12/03/22  7:52 AM   Result Value Ref Range    Color YELLOW/STRAW      Appearance CLEAR CLEAR      Specific gravity 1.020 1.003 - 1.030      pH (UA) 5.0 5.0 - 8.0      Protein Negative NEG mg/dL    Glucose Negative NEG mg/dL    Ketone Negative NEG mg/dL    Bilirubin Negative NEG      Blood MODERATE (A) NEG      Urobilinogen 0.2 0.2 - 1.0 EU/dL    Nitrites Negative NEG      Leukocyte Esterase Negative NEG      WBC 0-4 0 - 4 /hpf    RBC 5-10 0 - 5 /hpf    Epithelial cells FEW FEW /lpf    Bacteria Negative NEG /hpf   URINE CULTURE HOLD SAMPLE    Collection Time: 12/03/22  7:52 AM    Specimen: Urine   Result Value Ref Range    Urine culture hold        Urine on hold in Microbiology dept for 2 days. If unpreserved urine is submitted, it cannot be used for addtional testing after 24 hours, recollection will be required.        Radiologic Studies -   CT ABD PELV W CONT   Final Result   Punctate stone left distal ureter causing mild left hydronephrosis, delayed   nephrogram, and slight perinephric inflammatory changes. Left lower quadrant   hernia containing small bowel with resultant short segment small bowel   dilatation in the left abdomen. CT Results  (Last 48 hours)                 12/03/22 0539  CT ABD PELV W CONT Final result    Impression:  Punctate stone left distal ureter causing mild left hydronephrosis, delayed   nephrogram, and slight perinephric inflammatory changes. Left lower quadrant   hernia containing small bowel with resultant short segment small bowel   dilatation in the left abdomen. Narrative:  EXAM: CT ABD PELV W CONT       INDICATION: Left flank pain, hx of kidney cancer       COMPARISON: 11/28/2018        CONTRAST: 100 mL of Isovue-370. ORAL CONTRAST: None       TECHNIQUE:    Following the uneventful intravenous administration of contrast, thin axial   images were obtained through the abdomen and pelvis. Coronal and sagittal   reconstructions were generated. CT dose reduction was achieved through use of a   standardized protocol tailored for this examination and automatic exposure   control for dose modulation. FINDINGS:    LOWER THORAX: No significant abnormality in the incidentally imaged lower chest.   LIVER: Hepatic steatosis. BILIARY TREE: Gallbladder is within normal limits. CBD is not dilated. SPLEEN: within normal limits. PANCREAS: No mass or ductal dilatation. ADRENALS: Unremarkable. KIDNEYS: Punctate stone left distal ureter causing mild left hydronephrosis,   delayed nephrogram, and slight perinephric inflammatory changes. Postoperative   changes right kidney. STOMACH: Unremarkable. SMALL BOWEL: Mild distention of small bowel loops in the left abdomen. COLON: No dilatation or wall thickening. APPENDIX: Not visualized. PERITONEUM: No ascites or pneumoperitoneum. RETROPERITONEUM: No lymphadenopathy or aortic aneurysm. REPRODUCTIVE ORGANS: Unremarkable. URINARY BLADDER: Decompressed.    BONES: Degenerative changes are seen in the lumbar spine. ABDOMINAL WALL: Left lower quadrant hernia containing small bowel. ADDITIONAL COMMENTS: N/A                 CXR Results  (Last 48 hours)      None          Medical Decision Making   I am the first provider for this patient. I reviewed the vital signs, available nursing notes, past medical history, past surgical history, family history and social history. Vital Signs-Reviewed the patient's vital signs. Patient Vitals for the past 12 hrs:   SpO2   12/03/22 0710 94 %       Records Reviewed: Nursing records and medical records reviewed    Provider Notes (Medical Decision Making):   Patient is a well-appearing patient presents the emergency department 24 hours of left-sided flank pain. No fevers chills. Doubt infectious component. Has multiple possible causes of infection. Primary concern is for kidney pathology with his history of cancer. Presentation is similar to have a kidney stone present. Also with history of AAA repair as well as colectomy with known adhesions. Will obtain CT abdomen pelvis with contrast to evaluate for possibility of kidney stone, worsening malignancy, bowel obstruction. Doubt ACS, but with hx of CAD will check EKG and troponin. Sx tx with IVF, IV zofran, and IV morphine. Will reassess following workup and interventions. ED Course:   Initial assessment performed. The patients presenting problems have been discussed, and they are in agreement with the care plan formulated and outlined with them. I have encouraged them to ask questions as they arise throughout their visit. ED Course as of 12/03/22 1857   Sat Dec 03, 2022   0444 EKG as interpreted by me with sinus rhythm, heart rate 71, normal axis, normal intervals other than slightly prolonged QTC at 480, no ST changes [WB]   0504 Patient signed out to oncoming provider Dr. Cristina Mason at this time. No improvement in pain with morphine. Have ordered 1 mg IV Dilaudid.   Lipase elevated to 419. [WB]   [id number] Patient signed out to myself pending CT imaging. CT shows punctate left distal ureteral stone causing mild left hydronephrosis with some perinephric inflammatory changes. In addition to that there is a left ventral hernia containing small bowel with a short segment of small bowel dilation [WB-2]   0617 Small bowel is described as mildly distended. No described pancreatic inflammatory changes [WB-2]   0627 Non-reducible hernia in LLQ of abdomen, normal bowel sounds, ongoing nausea in spite of zofran, ongoing pain in spite of dilaudid and morphine [WB-2]   0709 I did speak with on call surgeon Dr Louis Craig, will come evaluate patient [WB-2]      ED Course User Index  [WB] Best, Larry Fleischer, MD  [WB-2] Alena Mata MD       Critical Care:  None      Disposition:  Home    12/3 1858: After signing patient out hernia was found. General surgery was able to reduce this. No signs of infection on UA. Patient with left-sided kidney stone that was likely causing his symptoms. He was given oral analgesics, PO tamsulosin, and instructions for followup with urology. DISCHARGE PLAN:  1. Discharge Medication List as of 12/3/2022  8:36 AM        START taking these medications    Details   tamsulosin (Flomax) 0.4 mg capsule Take 1 Capsule by mouth daily for 15 days. , Normal, Disp-15 Capsule, R-0      oxyCODONE IR (Roxicodone) 5 mg immediate release tablet Take 1 Tablet by mouth every six (6) hours as needed for Pain for up to 5 days. Max Daily Amount: 20 mg., Normal, Disp-7 Tablet, R-0      ondansetron hcl (Zofran) 4 mg tablet Take 1 Tablet by mouth every eight (8) hours as needed for Nausea., Normal, Disp-20 Tablet, R-0           CONTINUE these medications which have NOT CHANGED    Details   clobetasoL (TEMOVATE) 0.05 % ointment APPLY TO AFFECTED AREA TWO (2) TIMES A DAY FOR 14 DAYS.  AVOID FACE, Normal, Disp-15 g, R-0      escitalopram oxalate (LEXAPRO) 20 mg tablet TAKE 1 TABLET BY MOUTH EVERY DAY., Normal, Disp-90 Tablet, R-1DX Code Needed  NO MORE REFILLS, PT NEEDS NEW RX.      rosuvastatin (CRESTOR) 20 mg tablet Take 1 Tablet by mouth nightly. (TO REPLACE ATORVASTATIN), Normal, Disp-90 Tablet, R-1      metoprolol tartrate (LOPRESSOR) 50 mg tablet Take 0.5 Tabs by mouth two (2) times a day., Normal, Disp-30 Tab, R-0      clopidogrel (PLAVIX) 75 mg tab Take 1 Tab by mouth daily. , Print, Disp-45 Tab, R-0      aspirin (ASPIRIN) 325 mg tablet Take 1 Tab by mouth daily. , Print, Disp-30 Tab, R-6      furosemide (LASIX) 20 mg tablet Take  by mouth daily. , Historical Med           2. Follow-up Information       Follow up With Specialties Details [address]    [phone]. Call to set up appointment with urology          3. Return to ED if worse     Diagnosis     Clinical Impression:   1. Nephrolithiasis      Attestations:    Cleve Chappell MD    Please note that this dictation was completed with [employer] the [employer] voice recognition software. Quite often unanticipated grammatical, syntax, homophones, and other interpretive errors are inadvertently transcribed by the computer software. Please disregard these errors. Please excuse any errors that have escaped final proofreading. Thank you.

## 2022-12-03 NOTE — ED NOTES
TRANSFER - IN REPORT:    Verbal report received from Felisa Cortez RN (name) on [employer]. Report consisted of patients Situation, Background, Assessment and   Recommendations(SBAR). Information from the following report(s) SBAR and ED Summary was reviewed with the receiving nurse. Opportunity for questions and clarification was provided.

## 2022-12-03 NOTE — ED NOTES
Verbal shift change report given to Bonita Wilson (oncoming nurse) by Shaun Michael RN (offgoing nurse). Report included the following information SBAR and ED Summary.

## 2022-12-03 NOTE — CONSULTS
Surgery Consult    Subjective:      Blaine Lang is a 58 y.o. male who is being seen for evaluation of abdominal pain. The pain is located in the LLQ with radiation to left flank. Pain is described as dull and aching and measures 5/10 in intensity. Onset of pain was 3 days ago. Aggravating factors include movement, activity, and pressure. Alleviating factors include none. Associated symptoms include anorexia, nausea, and vomiting. Patient Active Problem List    Diagnosis Date Noted    Anxiety with depression 06/03/2022    Severe obesity ([address] 12/14/2020    Kidney carcinoma ([address] 07/01/2018    Unstable angina ([address] 06/20/2018    Acquired ptosis of right eyelid 06/11/2018    Blepharitis of eyelid of left eye 06/11/2018    Blepharitis of eyelid of right eye 06/11/2018    Cataract, nuclear sclerotic, both eyes 06/11/2018    Papillary renal cell carcinoma ([address] based on CT result 6/2018 06/08/2018    Non compliance with medical treatment 10/21/2014    Prediabetes 08/03/2012    Vitamin D deficiency 08/03/2012    Cigarette smoker 05/23/2011    Anxiety 09/21/2010    Pure hypercholesterolemia 09/18/2009    CAD (coronary artery disease) 09/18/2009    Essential hypertension 09/18/2009     Past Medical History:   Diagnosis Date    Anxiety     CAD (coronary artery disease)     PTCA dr roger Wilson   2/19/16 note/ekg    CAD (coronary artery disease)     PTCA Dr. Danica Mak 06/2018    CHF (congestive heart failure) ([address] 2/2016    well compenstated per notes    Chronic kidney disease     mass on right kidney, diagnosed as cancer     Diverticulitis     with colon resection    Finger pain, right     Index finger- started 2 years- Painful if hit.     History of chicken pox     Hypercholesteremia     Hypertension     Insomnia     Kidney carcinoma ([address] 07/2018    right, partial nephrectomy 8/2018, Dr. Rosangela Hodgson, no chemo    Knee pain 2009    ortho for cortisone injection    Papillary renal cell carcinoma ([address] based on CT result 6/2018 6/8/2018    Prediabetes     Screening cholesterol level 02/12/15    from [facility]  and again 2/2016    Vitamin D deficiency     again 8/2014 again 12/2016      Past Surgical History:   Procedure Laterality Date    COLONOSCOPY  9/06    dr Kimberly Prado- normal repeat 10 years    EGD  9/06     dr Palomo Llanos, DIAGNOSTIC  9/2006    HX COLONOSCOPY  01/10/2017    10 yr repeat    HX OTHER SURGICAL  3/17/16    exercise strss test report [place]    HX TOTAL COLECTOMY      for ruptured diverticulitis    ID ABDOMEN SURGERY PROC UNLISTED      aaa,inguinal hernia, hemicolectomy    ID CARDIAC SURG PROCEDURE UNLIST  2005    ptca and stents    ID CARDIAC SURG PROCEDURE UNLIST  06/2018    PTCA with stents       Social History     Tobacco Use    Smoking status: Every Day     Packs/day: 2.00     Years: 40.00     Pack years: 80.00     Types: Cigarettes    Smokeless tobacco: Never   Substance Use Topics    Alcohol use: Yes     Comment: rare social      Family History   Problem Relation Age of Onset    Diabetes Mother     Heart Disease Father       No current facility-administered medications for this encounter. Current Outpatient Medications   Medication Sig    clobetasoL (TEMOVATE) 0.05 % ointment APPLY TO AFFECTED AREA TWO (2) TIMES A DAY FOR 14 DAYS. AVOID FACE    escitalopram oxalate (LEXAPRO) 20 mg tablet TAKE 1 TABLET BY MOUTH EVERY DAY. rosuvastatin (CRESTOR) 20 mg tablet Take 1 Tablet by mouth nightly. (TO REPLACE ATORVASTATIN)    metoprolol tartrate (LOPRESSOR) 50 mg tablet Take 0.5 Tabs by mouth two (2) times a day. (Patient taking differently: Take 25 mg by mouth daily.)    clopidogrel (PLAVIX) 75 mg tab Take 1 Tab by mouth daily. aspirin (ASPIRIN) 325 mg tablet Take 1 Tab by mouth daily. (Patient taking differently: Take 83 mg by mouth daily.)    furosemide (LASIX) 20 mg tablet Take  by mouth daily.       No Known Allergies    Review of Systems:    A comprehensive review of systems was negative except for that written in the History of Present Illness. Objective:      Visit Vitals  BP (!) 168/72   Pulse 71   Temp 98.2 °F (36.8 °C)   Resp 18   Ht 5' 9\" (1.753 m)   Wt 117 kg (257 lb 15 oz)   SpO2 94%   BMI 38.09 kg/m²       Physical Exam:  GENERAL: alert, cooperative, no distress, appears stated age, LUNG: clear to auscultation bilaterally, HEART: regular rate and rhythm, S1, S2 normal, no murmur, click, rub or gallop, ABDOMEN: soft, non-tender. Bowel sounds normal. No masses,  no organomegaly. Well healed midline incision and LLQ wound. LLQ incisional hernia. Reduced with verbalized 'that feels better'      Imaging:  images and reports reviewed    Lab/Data Review:  BMP:   Lab Results   Component Value Date/Time     12/03/2022 04:23 AM    K 3.8 12/03/2022 04:23 AM     (H) 12/03/2022 04:23 AM    CO2 27 12/03/2022 04:23 AM    AGAP 5 12/03/2022 04:23 AM     (H) 12/03/2022 04:23 AM    BUN 16 12/03/2022 04:23 AM    CREA 1.17 12/03/2022 04:23 AM     CBC:   Lab Results   Component Value Date/Time    WBC 9.3 12/03/2022 04:23 AM    HGB 15.1 12/03/2022 04:23 AM    HCT 45.3 12/03/2022 04:23 AM     12/03/2022 04:23 AM         Assessment:     Abdominal pain with LLQ incisional hernia. Reduced. Kidney stone with hydro and stranding. Plan:     1. Hernia -  reduced. OK to be discharged. Recommend follow-up for elective hernia repair.       2. Follow up U/A

## 2023-02-23 ENCOUNTER — OFFICE VISIT (OUTPATIENT)
Dept: INTERNAL MEDICINE CLINIC | Age: 63
End: 2023-02-23
Payer: COMMERCIAL

## 2023-02-23 VITALS
SYSTOLIC BLOOD PRESSURE: 155 MMHG | HEIGHT: 69 IN | TEMPERATURE: 98.5 F | DIASTOLIC BLOOD PRESSURE: 78 MMHG | BODY MASS INDEX: 35.55 KG/M2 | WEIGHT: 240 LBS | HEART RATE: 77 BPM | OXYGEN SATURATION: 97 %

## 2023-02-23 DIAGNOSIS — S39.012A STRAIN OF LUMBAR REGION, INITIAL ENCOUNTER: Primary | ICD-10-CM

## 2023-02-23 DIAGNOSIS — C64.9 CARCINOMA OF KIDNEY, UNSPECIFIED LATERALITY (HCC): ICD-10-CM

## 2023-02-23 DIAGNOSIS — Z87.442 HISTORY OF KIDNEY STONES: ICD-10-CM

## 2023-02-23 PROCEDURE — 3078F DIAST BP <80 MM HG: CPT | Performed by: PHYSICIAN ASSISTANT

## 2023-02-23 PROCEDURE — 99214 OFFICE O/P EST MOD 30 MIN: CPT | Performed by: PHYSICIAN ASSISTANT

## 2023-02-23 PROCEDURE — 3077F SYST BP >= 140 MM HG: CPT | Performed by: PHYSICIAN ASSISTANT

## 2023-02-23 RX ORDER — METHYLPREDNISOLONE 4 MG/1
TABLET ORAL
Qty: 1 DOSE PACK | Refills: 0 | Status: SHIPPED | OUTPATIENT
Start: 2023-02-23

## 2023-02-23 RX ORDER — CYCLOBENZAPRINE HCL 10 MG
10 TABLET ORAL
Qty: 30 TABLET | Refills: 0 | Status: SHIPPED | OUTPATIENT
Start: 2023-02-23

## 2023-02-23 NOTE — PROGRESS NOTES
Subjective:   Nelia Stokes is a 58 y.o. male who complains of right low back pain x 1 month. The pain is quiescent if he lays still, but if he twists to get out of bed he has a right low back stabbing pain that he rates 10/10. It does not radiate. No leg weakness, tingling, or numbness. No groin pain. He plays a lot of golf and wonders if he pulled a muscle, but feels well during golf. Of note, had left flank pain with N/V 12/3/22 after playing golf, was seen in ER, had Abd CT showing small left kidney stone that he states he passed about 10 days later, never saw his urologist. That pain resolved and current right side pain feels differently. Also brings in form for wife's health insurance to record vitals and health maintenance labs from 3/1/22 - 2/28/23      Past Medical History:   Diagnosis Date    Anxiety     CAD (coronary artery disease)     PTCA dr roger Eastman   2/19/16 note/ekg    CAD (coronary artery disease)     PTCA Dr. Cayetano Barrios 06/2018    CHF (congestive heart failure) ([facility] [address] 2/2016    well compenstated per notes    Chronic kidney disease     mass on right kidney, diagnosed as cancer     Diverticulitis     with colon resection    Finger pain, right     Index finger- started 2 years- Painful if hit.     History of chicken pox     Hypercholesteremia     Hypertension     Insomnia     Kidney carcinoma ([address] 07/2018    right, partial nephrectomy 8/2018, Dr. Jd Parish, no chemo    Knee pain 2009    ortho for cortisone injection    Papillary renal cell carcinoma ([facility] [address] based on CT result 6/2018 6/8/2018    Prediabetes     Screening cholesterol level 02/12/15    from [facility] clinic  and again 2/2016    Vitamin D deficiency     again 8/2014 again 12/2016     Social History     Tobacco Use    Smoking status: Every Day     Packs/day: 2.00     Years: 40.00     Pack years: 80.00     Types: Cigarettes    Smokeless tobacco: Never   Vaping Use    Vaping Use: Never used   Substance Use Topics    Alcohol use: Yes     Comment: rare social    Drug use: No     Outpatient Medications Marked as Taking for the 2/23/23 encounter (Office Visit) with Caitlin Ferro PA-C   Medication Sig Dispense Refill    ondansetron hcl (Zofran) 4 mg tablet Take 1 Tablet by mouth every eight (8) hours as needed for Nausea. 20 Tablet 0    clobetasoL (TEMOVATE) 0.05 % ointment APPLY TO AFFECTED AREA TWO (2) TIMES A DAY FOR 14 DAYS. AVOID FACE 15 g 0    escitalopram oxalate (LEXAPRO) 20 mg tablet TAKE 1 TABLET BY MOUTH EVERY DAY. 90 Tablet 1    rosuvastatin (CRESTOR) 20 mg tablet Take 1 Tablet by mouth nightly. (TO REPLACE ATORVASTATIN) 90 Tablet 1    metoprolol tartrate (LOPRESSOR) 50 mg tablet Take 0.5 Tabs by mouth two (2) times a day. (Patient taking differently: Take 25 mg by mouth daily.) 30 Tab 0    clopidogrel (PLAVIX) 75 mg tab Take 1 Tab by mouth daily. 45 Tab 0    aspirin (ASPIRIN) 325 mg tablet Take 1 Tab by mouth daily. (Patient taking differently: Take 83 mg by mouth daily.) 30 Tab 6    furosemide (LASIX) 20 mg tablet Take  by mouth daily. No Known Allergies     Review of Systems  A comprehensive review of systems was negative except for that written in the HPI. Objective:     Visit Vitals  BP (!) 155/78 (BP 1 Location: Left upper arm, BP Patient Position: Sitting, BP Cuff Size: Adult)   Pulse 77   Temp 98.5 °F (36.9 °C) (Oral)   Ht 5' 9\" (1.753 m)   Wt 240 lb (108.9 kg)   SpO2 97%   BMI 35.44 kg/m²     Waist circumference 48 in    General:   alert, cooperative   Eyes: conjunctivae/scleras clear. PERRL, EOM's intact   Neck: Supple, trachea midline   Lungs:  no increased work of breathing   Abdomen: Soft, nontender. Normal bowel sounds.  No CVAT, no groin pain   Extremities: No edema or cyanosis     Back: While seated, no tenderness, no pain on trunk rotation, flexion or side stretching  (+)pain reproduced when lying supine with legs extended, pain is located in right lower lateral spinal muscles and severely increased on trunk rotation with tenderness of right lateral erector spinae group  SLR negative  Strength BLE 5/5, sensation intact        No results found for this visit on 02/23/23. CT Results (most recent):  Results from Hospital Encounter encounter on 12/03/22    CT ABD PELV W CONT    Narrative  EXAM: CT ABD PELV W CONT    INDICATION: Left flank pain, hx of kidney cancer    COMPARISON: 11/28/2018    CONTRAST: 100 mL of Isovue-370. ORAL CONTRAST: None    TECHNIQUE:  Following the uneventful intravenous administration of contrast, thin axial  images were obtained through the abdomen and pelvis. Coronal and sagittal  reconstructions were generated. CT dose reduction was achieved through use of a  standardized protocol tailored for this examination and automatic exposure  control for dose modulation. FINDINGS:  LOWER THORAX: No significant abnormality in the incidentally imaged lower chest.  LIVER: Hepatic steatosis. BILIARY TREE: Gallbladder is within normal limits. CBD is not dilated. SPLEEN: within normal limits. PANCREAS: No mass or ductal dilatation. ADRENALS: Unremarkable. KIDNEYS: Punctate stone left distal ureter causing mild left hydronephrosis,  delayed nephrogram, and slight perinephric inflammatory changes. Postoperative  changes right kidney. STOMACH: Unremarkable. SMALL BOWEL: Mild distention of small bowel loops in the left abdomen. COLON: No dilatation or wall thickening. APPENDIX: Not visualized. PERITONEUM: No ascites or pneumoperitoneum. RETROPERITONEUM: No lymphadenopathy or aortic aneurysm. REPRODUCTIVE ORGANS: Unremarkable. URINARY BLADDER: Decompressed. BONES: Degenerative changes are seen in the lumbar spine. ABDOMINAL WALL: Left lower quadrant hernia containing small bowel. ADDITIONAL COMMENTS: N/A    Impression  Punctate stone left distal ureter causing mild left hydronephrosis, delayed  nephrogram, and slight perinephric inflammatory changes.  Left lower quadrant  hernia containing small bowel with resultant short segment small bowel  dilatation in the left abdomen. Results for orders placed or performed during the hospital encounter of 12/03/22   URINE CULTURE HOLD SAMPLE    Specimen: Urine   Result Value Ref Range    Urine culture hold        Urine on hold in Microbiology dept for 2 days. If unpreserved urine is submitted, it cannot be used for addtional testing after 24 hours, recollection will be required. CBC WITH AUTOMATED DIFF   Result Value Ref Range    WBC 9.3 4.1 - 11.1 K/uL    RBC 5.08 4.10 - 5.70 M/uL    HGB 15.1 12.1 - 17.0 g/dL    HCT 45.3 36.6 - 50.3 %    MCV 89.2 80.0 - 99.0 FL    MCH 29.7 26.0 - 34.0 PG    MCHC 33.3 30.0 - 36.5 g/dL    RDW 13.6 11.5 - 14.5 %    PLATELET 372 986 - 297 K/uL    MPV 11.0 8.9 - 12.9 FL    NRBC 0.0 0  WBC    ABSOLUTE NRBC 0.00 0.00 - 0.01 K/uL    NEUTROPHILS 69 32 - 75 %    LYMPHOCYTES 16 12 - 49 %    MONOCYTES 10 5 - 13 %    EOSINOPHILS 3 0 - 7 %    BASOPHILS 1 0 - 1 %    IMMATURE GRANULOCYTES 1 (H) 0.0 - 0.5 %    ABS. NEUTROPHILS 6.4 1.8 - 8.0 K/UL    ABS. LYMPHOCYTES 1.5 0.8 - 3.5 K/UL    ABS. MONOCYTES 1.0 0.0 - 1.0 K/UL    ABS. EOSINOPHILS 0.3 0.0 - 0.4 K/UL    ABS. BASOPHILS 0.1 0.0 - 0.1 K/UL    ABS. IMM. GRANS. 0.1 (H) 0.00 - 0.04 K/UL    DF AUTOMATED     METABOLIC PANEL, COMPREHENSIVE   Result Value Ref Range    Sodium 142 136 - 145 mmol/L    Potassium 3.8 3.5 - 5.1 mmol/L    Chloride 110 (H) 97 - 108 mmol/L    CO2 27 21 - 32 mmol/L    Anion gap 5 5 - 15 mmol/L    Glucose 168 (H) 65 - 100 mg/dL    BUN 16 6 - 20 MG/DL    Creatinine 1.17 0.70 - 1.30 MG/DL    BUN/Creatinine ratio 14 12 - 20      eGFR >60 >60 ml/min/1.73m2    Calcium 8.8 8.5 - 10.1 MG/DL    Bilirubin, total 0.6 0.2 - 1.0 MG/DL    ALT (SGPT) 29 12 - 78 U/L    AST (SGOT) 15 15 - 37 U/L    Alk.  phosphatase 104 45 - 117 U/L    Protein, total 7.1 6.4 - 8.2 g/dL    Albumin 3.8 3.5 - 5.0 g/dL    Globulin 3.3 2.0 - 4.0 g/dL    A-G Ratio 1.2 1.1 - 2.2 URINALYSIS W/ RFLX MICROSCOPIC   Result Value Ref Range    Color YELLOW/STRAW      Appearance CLEAR CLEAR      Specific gravity 1.020 1.003 - 1.030      pH (UA) 5.0 5.0 - 8.0      Protein Negative NEG mg/dL    Glucose Negative NEG mg/dL    Ketone Negative NEG mg/dL    Bilirubin Negative NEG      Blood MODERATE (A) NEG      Urobilinogen 0.2 0.2 - 1.0 EU/dL    Nitrites Negative NEG      Leukocyte Esterase Negative NEG      WBC 0-4 0 - 4 /hpf    RBC 5-10 0 - 5 /hpf    Epithelial cells FEW FEW /lpf    Bacteria Negative NEG /hpf   TROPONIN-HIGH SENSITIVITY   Result Value Ref Range    Troponin-High Sensitivity 11 0 - 76 ng/L   LIPASE   Result Value Ref Range    Lipase 419 (H) 73 - 393 U/L   EKG, 12 LEAD, INITIAL   Result Value Ref Range    Ventricular Rate 71 BPM    Atrial Rate 71 BPM    P-R Interval 160 ms    QRS Duration 108 ms    Q-T Interval 442 ms    QTC Calculation (Bezet) 480 ms    Calculated P Axis 40 degrees    Calculated R Axis 42 degrees    Calculated T Axis 26 degrees    Diagnosis       Normal sinus rhythm  Prolonged QT  When compared with ECG of 22-JUN-2018 06:43,  No significant change was found  Confirmed by Porfirio Plush ([id number]) on 12/3/2022 5:31:38 PM           Assessment/Plan:     1. Strain of lumbar region, initial encounter    2. Carcinoma of kidney, unspecified laterality ([facility] [address]    3.  History of kidney stones      Muscular pain reproduced on exam  I suspect he may have strained one of his erector spinae muscles while playing golf  No signs to suggest kidney stone, and had CT of abdomen and pelvis in December with right side looking okay, and he has reported passing the left side kidney stone  I have asked him to not golf over the next 2 weeks  We will treat with Medrol Dosepak and Flexeril at bedtime (will avoid NSAIDs since he is on Plavix)  He has appointment scheduled next week to establish with new PCP  If not improving I recommend physical therapy    Form filled out for health insurance to include vital signs from today and lipid labs from June 2022    Orders Placed This Encounter    methylPREDNISolone (MEDROL DOSEPACK) 4 mg tablet     Sig: Take as instructed on package label     Dispense:  1 Dose Pack     Refill:  0    cyclobenzaprine (FLEXERIL) 10 mg tablet     Sig: Take 1 Tablet by mouth nightly as needed for Muscle Spasm(s). Dispense:  30 Tablet     Refill:  0         Verbal and written instructions (see AVS) provided. Patient expresses understanding of diagnosis and treatment plan.       Future Appointments   Date Time Provider Jose Mcguire   3/1/2023 11:00 AM JANE Sanderson BS AMB

## 2023-02-23 NOTE — PROGRESS NOTES
Marlen Vega is a 58 y.o. male     Chief Complaint   Patient presents with    Back Pain       Visit Vitals  BP (!) 155/78 (BP 1 Location: Left upper arm, BP Patient Position: Sitting, BP Cuff Size: Adult)   Pulse 77   Temp 98.5 °F (36.9 °C) (Oral)   Ht 5' 9\" (1.753 m)   Wt 240 lb (108.9 kg)   SpO2 97%   BMI 35.44 kg/m²       Health Maintenance Due   Topic Date Due    Low dose CT lung screening  Never done    COVID-19 Vaccine (3 - Booster for [employer] series) 06/09/2021    Depression Monitoring  08/16/2022         1. \"Have you been to the ER, urgent care clinic since your last visit? Hospitalized since your last visit? \"  Rissa Zapata    2. \"Have you seen or consulted any other health care providers outside of the [address] [place] [address] since your last visit? \" No     3. For patients aged 39-70: Has the patient had a colonoscopy / FIT/ Cologuard? Yes - no Care Gap present      If the patient is female:    4. For patients aged 41-77: Has the patient had a mammogram within the past 2 years? NA - based on age or sex      11. For patients aged 21-65: Has the patient had a pap smear?  NA - based on age or sex

## 2023-04-17 NOTE — PROGRESS NOTES
Debbie Vance  Identified pt with two pt identifiers(name and ). Chief Complaint   Patient presents with    Side Pain     Right side for the past month and it comes and goes. Worse when laying down. Also has right hand pain at times.  Blurred Vision     Has been to the Eye doctor and got glasses but has blurred vision.  Fatigue     started 10 years ago. 1. Have you been to the ER, urgent care clinic since your last visit? Hospitalized since your last visit? No    2. Have you seen or consulted any other health care providers outside of the [address] [place] [address] since your last visit? Include any pap smears or colon screening. No    Today's provider has been notified of reason for visit, vitals and flowsheets obtained on patients. Patient received paperwork for advance directive during previous visit but has not completed at this time     Reviewed record In preparation for visit, huddled with provider and have obtained necessary documentation      There are no preventive care reminders to display for this patient.     Wt Readings from Last 3 Encounters:   18 219 lb 8 oz (99.6 kg)   17 227 lb (103 kg)   16 266 lb 11.2 oz (121 kg)     Temp Readings from Last 3 Encounters:   17 97.8 °F (36.6 °C) (Oral)   16 97.9 °F (36.6 °C) (Oral)   16 96.7 °F (35.9 °C)     BP Readings from Last 3 Encounters:   17 131/81   16 152/84   16 128/83     Pulse Readings from Last 3 Encounters:   17 76   16 80   16 79     Vitals:    18 1756   Weight: 219 lb 8 oz (99.6 kg)   Height: 5' 8.5\" (1.74 m)   PainSc:   0 - No pain         Learning Assessment:  :     Learning Assessment 3/3/2015   PRIMARY LEARNER Patient   HIGHEST LEVEL OF EDUCATION - PRIMARY LEARNER  [employer]   BARRIERS PRIMARY LEARNER NONE   CO-LEARNER CAREGIVER No   PRIMARY LANGUAGE ENGLISH   LEARNER PREFERENCE PRIMARY LISTENING   ANSWERED BY patient   RELATIONSHIP SELF Depression Screening:  :     PHQ over the last two weeks 12/5/2017   Little interest or pleasure in doing things Not at all   Feeling down, depressed or hopeless Not at all   Total Score PHQ 2 0       Fall Risk Assessment:  :     No flowsheet data found. Abuse Screening:  :     No flowsheet data found. ADL Screening:  :     No flowsheet data found. Medication reconciliation up to date and corrected with patient at this time. [FreeTextEntry1] : vasculitis, recent pulmonary embolism, recent IVC filter placement in Aug 2022 who presented for IVC Filter removal on 1/17/2023.  When sheath was advanced via Right IJ, patient became bradycardic to 10-20 BPM with normal BP. Seen by EP: Transient bradycardia during procedure, possible etiology vagal.\par \par MCOT showed no significant arrhythmias. Normal Echo. \par \par She has no chest pain, no shortness of breath, no dyspnea on exertion, no orthopnea, no PND. She denies dizziness, lightheadedness and syncope. She has no exertional symptoms. She presents for evaluation.\par \par

## 2023-05-26 DIAGNOSIS — F41.8 OTHER SPECIFIED ANXIETY DISORDERS: ICD-10-CM

## 2023-05-26 RX ORDER — ESCITALOPRAM OXALATE 20 MG/1
TABLET ORAL
Qty: 90 TABLET | Refills: 1 | Status: SHIPPED | OUTPATIENT
Start: 2023-05-26

## 2023-05-26 NOTE — TELEPHONE ENCOUNTER
Last Refill: 11-28-22  Last Visit: 2/23/2023 Al[place]  Next Visit: Visit date not found     Requested Prescriptions     Pending Prescriptions Disp Refills    escitalopram (LEXAPRO) 20 MG tablet [Pharmacy Med Name: ESCITALOPRAM 20 MG TABLET] 90 tablet 1     Sig: TAKE 1 TABLET BY MOUTH EVERY DAY

## 2023-06-04 NOTE — PATIENT INSTRUCTIONS
Problem: Nausea/Vomiting  Goal: Maintains oral intake with decreased/no reports of nausea/vomiting  Outcome: Outcome Met, Continue evaluating goal progress toward completion  Goal: Verbalizes understanding of s/s and strategies to control nausea/vomiting  Description: Document education using the patient education activity.   Outcome: Outcome Met, Continue evaluating goal progress toward completion     Problem: Diabetes  Goal: Achieves glycemic balance  Description: Goal is to maintain blood sugar within range with no episodes of hypoglycemia  Outcome: Outcome Met, Continue evaluating goal progress toward completion  Goal: Verbalizes/demonstrates understanding of NEW diagnosis of diabetes and management  Description: Document on Patient Education Activity  Outcome: Outcome Met, Continue evaluating goal progress toward completion     Problem: Pain  Goal: #Acceptable pain level achieved/maintained at rest using NRS/Faces  Description: This goal is used for patients who can self-report.  Acceptable means the level is at or below the identified comfort/function goal.  Outcome: Outcome Met, Continue evaluating goal progress toward completion  Goal: # Verbalizes understanding of pain management  Description: Documented in Patient Education Activity  Outcome: Outcome Met, Continue evaluating goal progress toward completion      Vaccine Information Statement    Influenza (Flu) Vaccine (Inactivated or Recombinant): What You Need to Know    Many vaccine information statements are available in [place] and other languages. See www.immunize.org/vis. Hojas de información sobre vacunas están disponibles en español y en muchos otros idiomas. Visite www.immunize.org/vis. 1. Why get vaccinated? Influenza vaccine can prevent influenza (flu). Flu is a contagious disease that spreads around the [facility] every year, usually between October and May. Anyone can get the flu, but it is more dangerous for some people. Infants and young children, people 72 years and older, pregnant people, and people with certain health conditions or a weakened immune system are at greatest risk of flu complications. Pneumonia, bronchitis, sinus infections, and ear infections are examples of flu-related complications. If you have a medical condition, such as heart disease, cancer, or diabetes, flu can make it worse. Flu can cause fever and chills, sore throat, muscle aches, fatigue, cough, headache, and runny or stuffy nose. Some people may have vomiting and diarrhea, though this is more common in children than adults. In an average year, thousands of people in the [facility] die from flu, and many more are hospitalized. Flu vaccine prevents millions of illnesses and flu-related visits to the doctor each year. 2. Influenza vaccines     CDC recommends everyone 6 months and older get vaccinated every flu season. Children 6 months through 6years of age may need 2 doses during a single flu season. Everyone else needs only 1 dose each flu season. It takes about 2 weeks for protection to develop after vaccination. There are many flu viruses, and they are always changing. Each year a new flu vaccine is made to protect against the influenza viruses believed to be likely to cause disease in the upcoming flu season.  Even when the vaccine doesnt exactly match these viruses, it may still provide some protection. Influenza vaccine does not cause flu. Influenza vaccine may be given at the same time as other vaccines. 3. Talk with your health care provider    Tell your vaccination provider if the person getting the vaccine:  Has had an allergic reaction after a previous dose of influenza vaccine, or has any severe, life-threatening allergies   Has ever had Guillain-Barré Syndrome (also called GBS)    In some cases, your health care provider may decide to postpone influenza vaccination until a future visit. Influenza vaccine can be administered at any time during pregnancy. People who are or will be pregnant during influenza season should receive inactivated influenza vaccine. People with minor illnesses, such as a cold, may be vaccinated. People who are moderately or severely ill should usually wait until they recover before getting influenza vaccine. Your health care provider can give you more information. 4. Risks of a vaccine reaction    Soreness, redness, and swelling where the shot is given, fever, muscle aches, and headache can happen after influenza vaccination. There may be a very small increased risk of Guillain-Barré Syndrome (GBS) after inactivated influenza vaccine (the flu shot). [place] children who get the flu shot along with pneumococcal vaccine (PCV13) and/or DTaP vaccine at the same time might be slightly more likely to have a seizure caused by fever. Tell your health care provider if a child who is getting flu vaccine has ever had a seizure. People sometimes faint after medical procedures, including vaccination. Tell your provider if you feel dizzy or have vision changes or ringing in the ears. As with any medicine, there is a very remote chance of a vaccine causing a severe allergic reaction, other serious injury, or death. 5. What if there is a serious problem?     An allergic reaction could occur after the vaccinated person leaves the clinic. If you see signs of a severe allergic reaction (hives, swelling of the face and throat, difficulty breathing, a fast heartbeat, dizziness, or weakness), call 9-1-1 and get the person to the nearest hospital.    For other signs that concern you, call your health care provider. Adverse reactions should be reported to the Vaccine Adverse Event Reporting System (VAERS). Your health care provider will usually file this report, or you can do it yourself. Visit the VAERS website at www.vaers. [facility].gov or call 3-522.898.4410. VAERS is only for reporting reactions, and VAERS staff members do not give medical advice. 6. The National Vaccine Injury Compensation Program    The [facility] [place] [facility] Vaccine Injury Compensation Program (VICP) is a federal program that was created to compensate people who may have been injured by certain vaccines. Claims regarding alleged injury or death due to vaccination have a time limit for filing, which may be as short as two years. Visit the VICP website at www.[facility].gov/vaccinecompensation or call 4-868.844.9796 to learn about the program and about filing a claim. 7. How can I learn more? Ask your health care provider. Call your local or state health department. Visit the website of the Food and Drug Administration (FDA) for vaccine package inserts and additional information at www.fda.gov/vaccines-blood-biologics/vaccines. Contact the Centers for Disease Control and Prevention (CDC): Call 0-183.596.6669 (1-800-CDC-INFO) or  Visit CDCs influenza website at www.cdc.gov/flu. Vaccine Information Statement   Inactivated Influenza Vaccine   8/6/2021  [address] Lorin Dane [id number]-[id number]   Department of Health and Human Services  Centers for Disease Control and Prevention    Office Use Only

## 2023-11-20 NOTE — PROGRESS NOTES
1415- TRANSFER - OUT REPORT: Patient transferred to Cath lab then to [facility]    Verbal report given to [facility]) on [employer]  being transferred to [facility]) for routine post - op       Report consisted of patients Situation, Background, Assessment and   Recommendations(SBAR). Information from the following report(s) SBAR, Kardex, Procedure Summary, Intake/Output, MAR, Recent Results and Cardiac Rhythm NSR/SB was reviewed with the receiving nurse. Lines:   Peripheral IV 06/19/18 Right Antecubital (Active)   Site Assessment Clean, dry, & intact 6/20/2018  8:01 AM   Phlebitis Assessment 0 6/20/2018  8:01 AM   Infiltration Assessment 0 6/20/2018  8:01 AM   Dressing Status Clean, dry, & intact 6/20/2018  8:01 AM   Dressing Type Transparent;Tape 6/20/2018  8:01 AM   Hub Color/Line Status Pink; Infusing 6/20/2018  8:01 AM        Opportunity for questions and clarification was provided.       Patient transported with:   Monitor  Registered Nurse  Tech Lizette Caro MD - PGY4; Heide Gomez MD - PGY2

## 2024-02-06 DIAGNOSIS — F41.8 OTHER SPECIFIED ANXIETY DISORDERS: ICD-10-CM

## 2024-02-06 RX ORDER — ESCITALOPRAM OXALATE 20 MG/1
20 TABLET ORAL DAILY
Qty: 90 TABLET | Refills: 1 | Status: SHIPPED | OUTPATIENT
Start: 2024-02-06 | End: 2024-02-07 | Stop reason: SDUPTHER

## 2024-02-06 RX ORDER — CLOBETASOL PROPIONATE 0.5 MG/G
OINTMENT TOPICAL
Qty: 15 G | Refills: 1 | Status: SHIPPED | OUTPATIENT
Start: 2024-02-06

## 2024-02-06 NOTE — TELEPHONE ENCOUNTER
PCP: Lamar Sommer PA-C    Last appt: 2/23/2023  Future Appointments   Date Time Provider Department Center   2/7/2024 11:10 AM Leland Sharma MD [facility]       Requested Prescriptions     Pending Prescriptions Disp Refills    escitalopram (LEXAPRO) 20 MG tablet 90 tablet 1     Sig: Take 1 tablet by mouth daily    clobetasol (TEMOVATE) 0.05 % ointment 15 g 1     Sig: APPLY TO AFFECTED AREA TWO (2) TIMES A DAY FOR 14 DAYS. AVOID FACE       Prior labs and Blood pressures:  BP Readings from Last 3 Encounters:   02/23/23 (!) 155/78   12/01/22 (!) 140/78   06/02/22 122/70     Lab Results   Component Value Date/Time     12/03/2022 04:23 AM    K 3.8 12/03/2022 04:23 AM     12/03/2022 04:23 AM    CO2 27 12/03/2022 04:23 AM    BUN 16 12/03/2022 04:23 AM    GFRAA >60 06/02/2022 04:00 PM     No results found for: \"HBA1C\", \"JNE9ELYT\"  Lab Results   Component Value Date/Time    CHOL 189 06/02/2022 04:00 PM    HDL 29 06/02/2022 04:00 PM     No results found for: \"VITD3\", \"VD3RIA\"        Lab Results   Component Value Date/Time    TSH 1.63 06/02/2022 04:00 PM

## 2024-02-06 NOTE — TELEPHONE ENCOUNTER
----- Message from Genet Sahnnon sent at 2/6/2024 10:47 AM EST -----  Subject: Refill Request    QUESTIONS  Name of Medication? escitalopram (LEXAPRO) 20 MG tablet  Patient-reported dosage and instructions? 20mg, once a day  How many days do you have left? 3  Preferred Pharmacy? [id number]  Pharmacy phone number (if available)? [phone]  ---------------------------------------------------------------------------  --------------,  Name of Medication? clobetasol (TEMOVATE) 0.05 % ointment  Patient-reported dosage and instructions? as needed  How many days do you have left? 5  Preferred Pharmacy? [id number]  Pharmacy phone number (if available)? [phone]  Additional Information for Provider? Pt reports cream really helpful  ---------------------------------------------------------------------------  --------------  CALL BACK INFO  What is the best way for the office to contact you? OK to leave message on   voicemail  Preferred Call Back Phone Number? [phone]  ---------------------------------------------------------------------------  --------------  SCRIPT ANSWERS  Relationship to Patient? Self

## 2024-02-07 ENCOUNTER — OFFICE VISIT (OUTPATIENT)
Facility: CLINIC | Age: 64
End: 2024-02-07
Payer: COMMERCIAL

## 2024-02-07 VITALS
BODY MASS INDEX: 33.74 KG/M2 | RESPIRATION RATE: 16 BRPM | OXYGEN SATURATION: 98 % | HEIGHT: 69 IN | WEIGHT: 227.8 LBS | TEMPERATURE: 97.7 F | HEART RATE: 66 BPM | DIASTOLIC BLOOD PRESSURE: 78 MMHG | SYSTOLIC BLOOD PRESSURE: 136 MMHG

## 2024-02-07 DIAGNOSIS — F41.8 OTHER SPECIFIED ANXIETY DISORDERS: ICD-10-CM

## 2024-02-07 DIAGNOSIS — M54.2 NECK PAIN: Primary | ICD-10-CM

## 2024-02-07 PROCEDURE — 3075F SYST BP GE 130 - 139MM HG: CPT | Performed by: INTERNAL MEDICINE

## 2024-02-07 PROCEDURE — 99213 OFFICE O/P EST LOW 20 MIN: CPT | Performed by: INTERNAL MEDICINE

## 2024-02-07 PROCEDURE — 3078F DIAST BP <80 MM HG: CPT | Performed by: INTERNAL MEDICINE

## 2024-02-07 RX ORDER — PREDNISONE 5 MG/1
TABLET ORAL
Qty: 1 EACH | Refills: 0 | Status: SHIPPED | OUTPATIENT
Start: 2024-02-07

## 2024-02-07 RX ORDER — ESCITALOPRAM OXALATE 20 MG/1
20 TABLET ORAL DAILY
Qty: 90 TABLET | Refills: 1 | Status: SHIPPED | OUTPATIENT
Start: 2024-02-07

## 2024-02-07 RX ORDER — METAXALONE 800 MG/1
800 TABLET ORAL 3 TIMES DAILY
Qty: 30 TABLET | Refills: 0 | Status: SHIPPED | OUTPATIENT
Start: 2024-02-07 | End: 2024-02-17

## 2024-02-07 ASSESSMENT — PATIENT HEALTH QUESTIONNAIRE - PHQ9
SUM OF ALL RESPONSES TO PHQ QUESTIONS 1-9: 0
5. POOR APPETITE OR OVEREATING: 0
3. TROUBLE FALLING OR STAYING ASLEEP: 0
SUM OF ALL RESPONSES TO PHQ QUESTIONS 1-9: 0
2. FEELING DOWN, DEPRESSED OR HOPELESS: 0
SUM OF ALL RESPONSES TO PHQ9 QUESTIONS 1 & 2: 0
8. MOVING OR SPEAKING SO SLOWLY THAT OTHER PEOPLE COULD HAVE NOTICED. OR THE OPPOSITE, BEING SO FIGETY OR RESTLESS THAT YOU HAVE BEEN MOVING AROUND A LOT MORE THAN USUAL: 0
7. TROUBLE CONCENTRATING ON THINGS, SUCH AS READING THE NEWSPAPER OR WATCHING TELEVISION: 0
10. IF YOU CHECKED OFF ANY PROBLEMS, HOW DIFFICULT HAVE THESE PROBLEMS MADE IT FOR YOU TO DO YOUR WORK, TAKE CARE OF THINGS AT HOME, OR GET ALONG WITH OTHER PEOPLE: 0
9. THOUGHTS THAT YOU WOULD BE BETTER OFF DEAD, OR OF HURTING YOURSELF: 0
1. LITTLE INTEREST OR PLEASURE IN DOING THINGS: 0
SUM OF ALL RESPONSES TO PHQ QUESTIONS 1-9: 0
6. FEELING BAD ABOUT YOURSELF - OR THAT YOU ARE A FAILURE OR HAVE LET YOURSELF OR YOUR FAMILY DOWN: 0
4. FEELING TIRED OR HAVING LITTLE ENERGY: 0
SUM OF ALL RESPONSES TO PHQ QUESTIONS 1-9: 0

## 2024-02-07 NOTE — PROGRESS NOTES
Da Suarez is a 63 y.o. male     Chief Complaint   Patient presents with    Back Pain     Back/neck pain and right elbow soreness       BP (!) 140/80 (Site: Left Upper Arm, Position: Sitting, Cuff Size: Large Adult)   Pulse 66   Temp 97.7 °F (36.5 °C) (Oral)   Resp 16   Ht 1.753 m (5' 9\")   Wt 103.3 kg (227 lb 12.8 oz)   SpO2 98%   BMI 33.64 kg/m²     Health Maintenance Due   Topic Date Due    Depression Monitoring  Never done    HIV screen  Never done    Low dose CT lung screening &/or counseling  Never done    Respiratory Syncytial Virus (RSV) Pregnant or age 60 yrs+ (1 - 1-dose 60+ series) Never done    A1C test (Diabetic or Prediabetic)  06/02/2023    Lipids  06/02/2023    Flu vaccine (1) 08/01/2023    COVID-19 Vaccine (3 - 2023-24 season) 09/01/2023         \"Have you been to the ER, urgent care clinic since your last visit?  Hospitalized since your last visit?\"    NO    “Have you seen or consulted any other health care providers outside of [facility] since your last visit?”    NO                    
Automatic Reconciliation, Ar   furosemide (LASIX) 20 MG tablet Take by mouth daily   Yes Automatic Reconciliation, Ar   metoprolol tartrate (LOPRESSOR) 50 MG tablet Take by mouth 2 times daily 6/22/18  Yes Automatic Reconciliation, Ar   ondansetron (ZOFRAN) 4 MG tablet Take by mouth every 8 hours as needed 12/3/22  Yes Automatic Reconciliation, Ar   rosuvastatin (CRESTOR) 20 MG tablet Take by mouth 6/3/22  Yes Automatic Reconciliation, Ar        Review of Systems              Constitutional:  He denies fever, weight loss, sweats or fatigue.              EYES: No blurred or double vision,               ENT: no nasal congestion, no headache or dizziness.  No difficulty with               swallowing, taste, speech or smell.              Neck: Pain as noted right side  Respiratory:  No cough, wheezing or shortness of breath.  No sputum production.  Cardiac:  Denies chest pain, palpitations, unexplained indigestion, syncope, edema, PND or orthopnea.    GI:  No changes in bowel movements, no abdominal pain, no bloating, anorexia, nausea, vomiting or heartburn.  :  No frequency or dysuria.  Denies incontinence or sexual dysfunction.  Extremities:  No joint pain, stiffness or swelling  Back:.no pain or soreness  Skin:  No recent rashes or mole changes.  Neurological:  No numbness, tingling, burning paresthesias or loss of motor strength.  No syncope, dizziness, frequent headaches or memory loss.  Hematologic:  No easy bruising  Lymphatic: No lymph node enlargement    Objective:     Vitals:    02/07/24 1152   BP: 136/78   Pulse:    Resp:    Temp:    SpO2:       Body mass index is 33.64 kg/m².   Physical Examination:              General Appearance:  Well-developed, well-nourished, no acute distress.             HEENT:      Ears:  The TMs and ear canals were clear.  Eyes:  The pupillary responses were normal.  Extraocular muscle function intact.  Lids and conjunctiva not injected.  Funduscopic exam revealed sharp disc

## 2024-10-24 DIAGNOSIS — F41.8 OTHER SPECIFIED ANXIETY DISORDERS: ICD-10-CM

## 2024-10-24 NOTE — TELEPHONE ENCOUNTER
RX refill request from the patient/pharmacy. Patient last seen 02- with labs, and does not have a f/up appointment.  Requested Prescriptions     Pending Prescriptions Disp Refills    escitalopram (LEXAPRO) 20 MG tablet [Pharmacy Med Name: ESCITALOPRAM 20 MG TABLET] 90 tablet 0     Sig: TAKE 1 TABLET BY MOUTH EVERY DAY

## 2024-10-25 RX ORDER — ESCITALOPRAM OXALATE 20 MG/1
20 TABLET ORAL DAILY
Qty: 90 TABLET | Refills: 0 | Status: SHIPPED | OUTPATIENT
Start: 2024-10-25